# Patient Record
Sex: FEMALE | Race: WHITE | NOT HISPANIC OR LATINO | Employment: UNEMPLOYED | URBAN - METROPOLITAN AREA
[De-identification: names, ages, dates, MRNs, and addresses within clinical notes are randomized per-mention and may not be internally consistent; named-entity substitution may affect disease eponyms.]

---

## 2017-02-13 ENCOUNTER — TRANSCRIBE ORDERS (OUTPATIENT)
Dept: ADMINISTRATIVE | Facility: HOSPITAL | Age: 32
End: 2017-02-13

## 2017-02-13 DIAGNOSIS — E04.9 GOITER: ICD-10-CM

## 2017-02-13 DIAGNOSIS — R51.9 FACIAL PAIN: Primary | ICD-10-CM

## 2017-02-17 ENCOUNTER — HOSPITAL ENCOUNTER (OUTPATIENT)
Dept: RADIOLOGY | Facility: HOSPITAL | Age: 32
End: 2017-02-17
Payer: COMMERCIAL

## 2017-02-17 ENCOUNTER — HOSPITAL ENCOUNTER (OUTPATIENT)
Dept: RADIOLOGY | Facility: HOSPITAL | Age: 32
Discharge: HOME | End: 2017-02-17
Payer: COMMERCIAL

## 2017-02-17 DIAGNOSIS — E04.9 GOITER: ICD-10-CM

## 2017-02-17 PROCEDURE — 76536 US EXAM OF HEAD AND NECK: CPT

## 2017-05-29 ENCOUNTER — HOSPITAL ENCOUNTER (EMERGENCY)
Facility: HOSPITAL | Age: 32
Discharge: HOME | End: 2017-05-29
Attending: EMERGENCY MEDICINE | Admitting: EMERGENCY MEDICINE
Payer: COMMERCIAL

## 2017-05-29 VITALS
BODY MASS INDEX: 21.97 KG/M2 | DIASTOLIC BLOOD PRESSURE: 61 MMHG | TEMPERATURE: 97.9 F | RESPIRATION RATE: 18 BRPM | HEART RATE: 76 BPM | WEIGHT: 140 LBS | OXYGEN SATURATION: 98 % | SYSTOLIC BLOOD PRESSURE: 100 MMHG | HEIGHT: 67 IN

## 2017-05-29 DIAGNOSIS — K52.9 GASTROENTERITIS: Primary | ICD-10-CM

## 2017-05-29 PROCEDURE — 99283 EMERGENCY DEPT VISIT LOW MDM: CPT

## 2017-05-29 RX ORDER — METHADONE HYDROCHLORIDE 10 MG/1
70 TABLET ORAL DAILY
COMMUNITY
End: 2019-03-25

## 2017-05-29 RX ORDER — ONDANSETRON 4 MG/1
4 TABLET, FILM COATED ORAL EVERY 6 HOURS
Qty: 15 TABLET | Refills: 0 | Status: SHIPPED | OUTPATIENT
Start: 2017-05-29 | End: 2019-03-25

## 2017-05-29 RX ORDER — LEVOTHYROXINE SODIUM 0.1 MG/1
137 TABLET ORAL DAILY
COMMUNITY
End: 2019-06-02 | Stop reason: DRUGHIGH

## 2017-05-29 RX ORDER — ALPRAZOLAM 0.25 MG/1
0.25 TABLET ORAL
COMMUNITY
End: 2019-03-25

## 2017-12-28 ENCOUNTER — TRANSCRIBE ORDERS (OUTPATIENT)
Dept: ADMINISTRATIVE | Facility: HOSPITAL | Age: 32
End: 2017-12-28

## 2017-12-28 DIAGNOSIS — R10.2 ADNEXAL TENDERNESS, RIGHT: Primary | ICD-10-CM

## 2018-01-02 ENCOUNTER — GENERIC CONVERSION - ENCOUNTER (OUTPATIENT)
Dept: OTHER | Facility: OTHER | Age: 33
End: 2018-01-02

## 2018-01-02 ENCOUNTER — HOSPITAL ENCOUNTER (OUTPATIENT)
Dept: RADIOLOGY | Facility: HOSPITAL | Age: 33
Discharge: HOME | End: 2018-01-02
Attending: OBSTETRICS & GYNECOLOGY
Payer: COMMERCIAL

## 2018-01-02 DIAGNOSIS — R10.2 ADNEXAL TENDERNESS, RIGHT: ICD-10-CM

## 2018-01-02 PROCEDURE — 76856 US EXAM PELVIC COMPLETE: CPT

## 2018-01-02 PROCEDURE — 76830 TRANSVAGINAL US NON-OB: CPT

## 2018-02-26 ENCOUNTER — TRANSCRIBE ORDERS (OUTPATIENT)
Dept: ADMINISTRATIVE | Facility: HOSPITAL | Age: 33
End: 2018-02-26

## 2018-02-26 DIAGNOSIS — E03.9 HYPOTHYROIDISM, ADULT: Primary | ICD-10-CM

## 2018-02-27 ENCOUNTER — HOSPITAL ENCOUNTER (OUTPATIENT)
Dept: RADIOLOGY | Facility: HOSPITAL | Age: 33
Discharge: HOME | End: 2018-02-27
Payer: COMMERCIAL

## 2018-02-27 DIAGNOSIS — E03.9 HYPOTHYROIDISM, ADULT: ICD-10-CM

## 2018-02-27 PROCEDURE — 76536 US EXAM OF HEAD AND NECK: CPT

## 2018-07-27 ENCOUNTER — TRANSCRIBE ORDERS (OUTPATIENT)
Dept: ADMINISTRATIVE | Facility: HOSPITAL | Age: 33
End: 2018-07-27

## 2018-07-27 DIAGNOSIS — E22.1 IDIOPATHIC HYPERPROLACTINEMIA (HCC): Primary | ICD-10-CM

## 2018-08-06 ENCOUNTER — HOSPITAL ENCOUNTER (OUTPATIENT)
Dept: RADIOLOGY | Facility: HOSPITAL | Age: 33
Discharge: HOME | End: 2018-08-06
Payer: COMMERCIAL

## 2018-08-06 DIAGNOSIS — E22.1 IDIOPATHIC HYPERPROLACTINEMIA (HCC): ICD-10-CM

## 2018-08-06 PROCEDURE — A9585 GADOBUTROL INJECTION: HCPCS | Performed by: DENTIST

## 2018-08-06 PROCEDURE — 70553 MRI BRAIN STEM W/O & W/DYE: CPT

## 2018-08-06 RX ADMIN — GADOBUTROL 6 ML: 604.72 INJECTION INTRAVENOUS at 09:41

## 2018-10-05 ENCOUNTER — HOSPITAL ENCOUNTER (OUTPATIENT)
Dept: RADIOLOGY | Facility: HOSPITAL | Age: 33
Discharge: HOME | End: 2018-10-05
Payer: COMMERCIAL

## 2018-10-05 ENCOUNTER — TRANSCRIBE ORDERS (OUTPATIENT)
Dept: ADMINISTRATIVE | Facility: HOSPITAL | Age: 33
End: 2018-10-05

## 2018-10-05 DIAGNOSIS — M79.672 LEFT FOOT PAIN: ICD-10-CM

## 2018-10-05 DIAGNOSIS — M79.672 LEFT FOOT PAIN: Primary | ICD-10-CM

## 2018-10-05 PROCEDURE — 73620 X-RAY EXAM OF FOOT: CPT

## 2019-03-25 ENCOUNTER — CONSULT (OUTPATIENT)
Dept: PAIN MEDICINE | Facility: CLINIC | Age: 34
End: 2019-03-25
Payer: COMMERCIAL

## 2019-03-25 VITALS
HEIGHT: 66 IN | BODY MASS INDEX: 22.5 KG/M2 | HEART RATE: 81 BPM | WEIGHT: 140 LBS | SYSTOLIC BLOOD PRESSURE: 102 MMHG | DIASTOLIC BLOOD PRESSURE: 72 MMHG | RESPIRATION RATE: 18 BRPM

## 2019-03-25 DIAGNOSIS — M54.42 CHRONIC LEFT-SIDED LOW BACK PAIN WITH LEFT-SIDED SCIATICA: ICD-10-CM

## 2019-03-25 DIAGNOSIS — G89.4 CHRONIC PAIN SYNDROME: Primary | ICD-10-CM

## 2019-03-25 DIAGNOSIS — M54.16 LUMBAR RADICULOPATHY: ICD-10-CM

## 2019-03-25 DIAGNOSIS — G89.29 CHRONIC LEFT-SIDED LOW BACK PAIN WITH LEFT-SIDED SCIATICA: ICD-10-CM

## 2019-03-25 PROCEDURE — 99244 OFF/OP CNSLTJ NEW/EST MOD 40: CPT | Performed by: ANESTHESIOLOGY

## 2019-03-25 RX ORDER — LEVOTHYROXINE SODIUM 200 MCG
TABLET ORAL
Refills: 1 | COMMUNITY
Start: 2019-03-23 | End: 2019-06-02 | Stop reason: DRUGHIGH

## 2019-05-01 ENCOUNTER — EVALUATION (OUTPATIENT)
Dept: PHYSICAL THERAPY | Facility: CLINIC | Age: 34
End: 2019-05-01
Payer: COMMERCIAL

## 2019-05-01 DIAGNOSIS — G89.29 CHRONIC LEFT-SIDED LOW BACK PAIN WITH LEFT-SIDED SCIATICA: Primary | ICD-10-CM

## 2019-05-01 DIAGNOSIS — M54.42 CHRONIC LEFT-SIDED LOW BACK PAIN WITH LEFT-SIDED SCIATICA: Primary | ICD-10-CM

## 2019-05-01 PROCEDURE — 97162 PT EVAL MOD COMPLEX 30 MIN: CPT

## 2019-05-01 PROCEDURE — G8978 MOBILITY CURRENT STATUS: HCPCS

## 2019-05-01 PROCEDURE — G8979 MOBILITY GOAL STATUS: HCPCS

## 2019-05-03 ENCOUNTER — OFFICE VISIT (OUTPATIENT)
Dept: PHYSICAL THERAPY | Facility: CLINIC | Age: 34
End: 2019-05-03
Payer: COMMERCIAL

## 2019-05-03 DIAGNOSIS — M54.42 CHRONIC LEFT-SIDED LOW BACK PAIN WITH LEFT-SIDED SCIATICA: Primary | ICD-10-CM

## 2019-05-03 DIAGNOSIS — G89.29 CHRONIC LEFT-SIDED LOW BACK PAIN WITH LEFT-SIDED SCIATICA: Primary | ICD-10-CM

## 2019-05-03 PROCEDURE — 97110 THERAPEUTIC EXERCISES: CPT

## 2019-05-03 PROCEDURE — 97112 NEUROMUSCULAR REEDUCATION: CPT

## 2019-05-06 ENCOUNTER — OFFICE VISIT (OUTPATIENT)
Dept: PHYSICAL THERAPY | Facility: CLINIC | Age: 34
End: 2019-05-06
Payer: COMMERCIAL

## 2019-05-06 ENCOUNTER — OFFICE VISIT (OUTPATIENT)
Dept: PAIN MEDICINE | Facility: CLINIC | Age: 34
End: 2019-05-06
Payer: COMMERCIAL

## 2019-05-06 VITALS
HEIGHT: 66 IN | HEART RATE: 80 BPM | DIASTOLIC BLOOD PRESSURE: 70 MMHG | RESPIRATION RATE: 18 BRPM | WEIGHT: 136.4 LBS | BODY MASS INDEX: 21.92 KG/M2 | SYSTOLIC BLOOD PRESSURE: 100 MMHG

## 2019-05-06 DIAGNOSIS — M25.561 CHRONIC PAIN OF RIGHT KNEE: ICD-10-CM

## 2019-05-06 DIAGNOSIS — G89.4 CHRONIC PAIN SYNDROME: Primary | ICD-10-CM

## 2019-05-06 DIAGNOSIS — M54.16 LUMBAR RADICULOPATHY: ICD-10-CM

## 2019-05-06 DIAGNOSIS — M54.42 CHRONIC LEFT-SIDED LOW BACK PAIN WITH LEFT-SIDED SCIATICA: Primary | ICD-10-CM

## 2019-05-06 DIAGNOSIS — M54.42 CHRONIC LEFT-SIDED LOW BACK PAIN WITH LEFT-SIDED SCIATICA: ICD-10-CM

## 2019-05-06 DIAGNOSIS — G89.29 CHRONIC LEFT-SIDED LOW BACK PAIN WITH LEFT-SIDED SCIATICA: ICD-10-CM

## 2019-05-06 DIAGNOSIS — G89.29 CHRONIC PAIN OF RIGHT KNEE: ICD-10-CM

## 2019-05-06 DIAGNOSIS — G89.29 CHRONIC LEFT-SIDED LOW BACK PAIN WITH LEFT-SIDED SCIATICA: Primary | ICD-10-CM

## 2019-05-06 PROCEDURE — 99214 OFFICE O/P EST MOD 30 MIN: CPT | Performed by: ANESTHESIOLOGY

## 2019-05-06 PROCEDURE — 97112 NEUROMUSCULAR REEDUCATION: CPT

## 2019-05-06 PROCEDURE — 97110 THERAPEUTIC EXERCISES: CPT

## 2019-05-08 ENCOUNTER — APPOINTMENT (OUTPATIENT)
Dept: PHYSICAL THERAPY | Facility: CLINIC | Age: 34
End: 2019-05-08
Payer: COMMERCIAL

## 2019-05-13 ENCOUNTER — OFFICE VISIT (OUTPATIENT)
Dept: PHYSICAL THERAPY | Facility: CLINIC | Age: 34
End: 2019-05-13
Payer: COMMERCIAL

## 2019-05-13 DIAGNOSIS — G89.29 CHRONIC LEFT-SIDED LOW BACK PAIN WITH LEFT-SIDED SCIATICA: Primary | ICD-10-CM

## 2019-05-13 DIAGNOSIS — M54.42 CHRONIC LEFT-SIDED LOW BACK PAIN WITH LEFT-SIDED SCIATICA: Primary | ICD-10-CM

## 2019-05-13 PROCEDURE — 97140 MANUAL THERAPY 1/> REGIONS: CPT

## 2019-05-13 PROCEDURE — 97110 THERAPEUTIC EXERCISES: CPT

## 2019-05-13 PROCEDURE — 97112 NEUROMUSCULAR REEDUCATION: CPT

## 2019-05-15 ENCOUNTER — OFFICE VISIT (OUTPATIENT)
Dept: PHYSICAL THERAPY | Facility: CLINIC | Age: 34
End: 2019-05-15
Payer: COMMERCIAL

## 2019-05-15 DIAGNOSIS — M54.42 CHRONIC LEFT-SIDED LOW BACK PAIN WITH LEFT-SIDED SCIATICA: Primary | ICD-10-CM

## 2019-05-15 DIAGNOSIS — G89.29 CHRONIC LEFT-SIDED LOW BACK PAIN WITH LEFT-SIDED SCIATICA: Primary | ICD-10-CM

## 2019-05-15 PROCEDURE — 97110 THERAPEUTIC EXERCISES: CPT

## 2019-05-15 PROCEDURE — 97112 NEUROMUSCULAR REEDUCATION: CPT

## 2019-05-20 ENCOUNTER — OFFICE VISIT (OUTPATIENT)
Dept: PHYSICAL THERAPY | Facility: CLINIC | Age: 34
End: 2019-05-20
Payer: COMMERCIAL

## 2019-05-20 DIAGNOSIS — M54.42 CHRONIC LEFT-SIDED LOW BACK PAIN WITH LEFT-SIDED SCIATICA: Primary | ICD-10-CM

## 2019-05-20 DIAGNOSIS — G89.29 CHRONIC LEFT-SIDED LOW BACK PAIN WITH LEFT-SIDED SCIATICA: Primary | ICD-10-CM

## 2019-05-20 PROCEDURE — 97112 NEUROMUSCULAR REEDUCATION: CPT

## 2019-05-20 PROCEDURE — 97110 THERAPEUTIC EXERCISES: CPT

## 2019-05-22 ENCOUNTER — OFFICE VISIT (OUTPATIENT)
Dept: PHYSICAL THERAPY | Facility: CLINIC | Age: 34
End: 2019-05-22
Payer: COMMERCIAL

## 2019-05-22 DIAGNOSIS — G89.29 CHRONIC LEFT-SIDED LOW BACK PAIN WITH LEFT-SIDED SCIATICA: Primary | ICD-10-CM

## 2019-05-22 DIAGNOSIS — M54.42 CHRONIC LEFT-SIDED LOW BACK PAIN WITH LEFT-SIDED SCIATICA: Primary | ICD-10-CM

## 2019-05-22 PROCEDURE — 97110 THERAPEUTIC EXERCISES: CPT

## 2019-05-22 PROCEDURE — 97112 NEUROMUSCULAR REEDUCATION: CPT

## 2019-05-28 ENCOUNTER — APPOINTMENT (OUTPATIENT)
Dept: PHYSICAL THERAPY | Facility: CLINIC | Age: 34
End: 2019-05-28
Payer: COMMERCIAL

## 2019-05-30 ENCOUNTER — OFFICE VISIT (OUTPATIENT)
Dept: PHYSICAL THERAPY | Facility: CLINIC | Age: 34
End: 2019-05-30
Payer: COMMERCIAL

## 2019-05-30 DIAGNOSIS — G89.29 CHRONIC LEFT-SIDED LOW BACK PAIN WITH LEFT-SIDED SCIATICA: Primary | ICD-10-CM

## 2019-05-30 DIAGNOSIS — M54.42 CHRONIC LEFT-SIDED LOW BACK PAIN WITH LEFT-SIDED SCIATICA: Primary | ICD-10-CM

## 2019-05-30 PROCEDURE — G8981 BODY POS CURRENT STATUS: HCPCS

## 2019-05-30 PROCEDURE — G8983 BODY POS D/C STATUS: HCPCS

## 2019-05-30 PROCEDURE — G8982 BODY POS GOAL STATUS: HCPCS

## 2019-05-30 PROCEDURE — 97112 NEUROMUSCULAR REEDUCATION: CPT

## 2019-05-30 PROCEDURE — 97140 MANUAL THERAPY 1/> REGIONS: CPT

## 2019-05-30 PROCEDURE — 97110 THERAPEUTIC EXERCISES: CPT

## 2019-06-02 ENCOUNTER — APPOINTMENT (EMERGENCY)
Dept: RADIOLOGY | Facility: HOSPITAL | Age: 34
End: 2019-06-02
Payer: COMMERCIAL

## 2019-06-02 ENCOUNTER — HOSPITAL ENCOUNTER (EMERGENCY)
Facility: HOSPITAL | Age: 34
Discharge: HOME | End: 2019-06-02
Attending: EMERGENCY MEDICINE | Admitting: EMERGENCY MEDICINE
Payer: COMMERCIAL

## 2019-06-02 VITALS
DIASTOLIC BLOOD PRESSURE: 55 MMHG | SYSTOLIC BLOOD PRESSURE: 91 MMHG | OXYGEN SATURATION: 100 % | WEIGHT: 136 LBS | HEART RATE: 76 BPM | RESPIRATION RATE: 16 BRPM | TEMPERATURE: 98.4 F | BODY MASS INDEX: 21.95 KG/M2

## 2019-06-02 DIAGNOSIS — Z3A.01 LESS THAN 8 WEEKS GESTATION OF PREGNANCY: Primary | ICD-10-CM

## 2019-06-02 DIAGNOSIS — N83.10 CORPUS LUTEUM CYST: ICD-10-CM

## 2019-06-02 LAB
ANION GAP SERPL CALCULATED.3IONS-SCNC: 3 MMOL/L (ref 4–13)
B-HCG SERPL-ACNC: ABNORMAL MIU/ML
BASOPHILS # BLD AUTO: 0.07 THOUSANDS/ΜL (ref 0–0.1)
BASOPHILS NFR BLD AUTO: 1 % (ref 0–1)
BILIRUB UR QL STRIP: NEGATIVE
BUN SERPL-MCNC: 10 MG/DL (ref 5–25)
CALCIUM SERPL-MCNC: 8.5 MG/DL (ref 8.3–10.1)
CHLORIDE SERPL-SCNC: 103 MMOL/L (ref 100–108)
CLARITY UR: ABNORMAL
CO2 SERPL-SCNC: 31 MMOL/L (ref 21–32)
COLOR UR: YELLOW
CREAT SERPL-MCNC: 0.62 MG/DL (ref 0.6–1.3)
EOSINOPHIL # BLD AUTO: 0.17 THOUSAND/ΜL (ref 0–0.61)
EOSINOPHIL NFR BLD AUTO: 2 % (ref 0–6)
ERYTHROCYTE [DISTWIDTH] IN BLOOD BY AUTOMATED COUNT: 13.2 % (ref 11.6–15.1)
EXT PREG TEST URINE: POSITIVE
GFR SERPL CREATININE-BSD FRML MDRD: 118 ML/MIN/1.73SQ M
GLUCOSE SERPL-MCNC: 109 MG/DL (ref 65–140)
GLUCOSE UR STRIP-MCNC: NEGATIVE MG/DL
HCT VFR BLD AUTO: 38 % (ref 34.8–46.1)
HGB BLD-MCNC: 13 G/DL (ref 11.5–15.4)
HGB UR QL STRIP.AUTO: NEGATIVE
IMM GRANULOCYTES # BLD AUTO: 0.04 THOUSAND/UL (ref 0–0.2)
IMM GRANULOCYTES NFR BLD AUTO: 0 % (ref 0–2)
KETONES UR STRIP-MCNC: ABNORMAL MG/DL
LEUKOCYTE ESTERASE UR QL STRIP: NEGATIVE
LYMPHOCYTES # BLD AUTO: 2.36 THOUSANDS/ΜL (ref 0.6–4.47)
LYMPHOCYTES NFR BLD AUTO: 22 % (ref 14–44)
MCH RBC QN AUTO: 33 PG (ref 26.8–34.3)
MCHC RBC AUTO-ENTMCNC: 34.2 G/DL (ref 31.4–37.4)
MCV RBC AUTO: 96 FL (ref 82–98)
MONOCYTES # BLD AUTO: 0.8 THOUSAND/ΜL (ref 0.17–1.22)
MONOCYTES NFR BLD AUTO: 7 % (ref 4–12)
NEUTROPHILS # BLD AUTO: 7.52 THOUSANDS/ΜL (ref 1.85–7.62)
NEUTS SEG NFR BLD AUTO: 68 % (ref 43–75)
NITRITE UR QL STRIP: NEGATIVE
NRBC BLD AUTO-RTO: 0 /100 WBCS
PH UR STRIP.AUTO: 8 [PH]
PLATELET # BLD AUTO: 303 THOUSANDS/UL (ref 149–390)
PMV BLD AUTO: 9.9 FL (ref 8.9–12.7)
POTASSIUM SERPL-SCNC: 3.5 MMOL/L (ref 3.5–5.3)
PROT UR STRIP-MCNC: NEGATIVE MG/DL
RBC # BLD AUTO: 3.94 MILLION/UL (ref 3.81–5.12)
SODIUM SERPL-SCNC: 137 MMOL/L (ref 136–145)
SP GR UR STRIP.AUTO: 1.02 (ref 1–1.03)
UROBILINOGEN UR QL STRIP.AUTO: 0.2 E.U./DL
WBC # BLD AUTO: 10.96 THOUSAND/UL (ref 4.31–10.16)

## 2019-06-02 PROCEDURE — 76705 ECHO EXAM OF ABDOMEN: CPT

## 2019-06-02 PROCEDURE — 81025 URINE PREGNANCY TEST: CPT | Performed by: PHYSICIAN ASSISTANT

## 2019-06-02 PROCEDURE — 87491 CHLMYD TRACH DNA AMP PROBE: CPT | Performed by: PHYSICIAN ASSISTANT

## 2019-06-02 PROCEDURE — 85025 COMPLETE CBC W/AUTO DIFF WBC: CPT | Performed by: PHYSICIAN ASSISTANT

## 2019-06-02 PROCEDURE — 81003 URINALYSIS AUTO W/O SCOPE: CPT | Performed by: PHYSICIAN ASSISTANT

## 2019-06-02 PROCEDURE — 84702 CHORIONIC GONADOTROPIN TEST: CPT | Performed by: PHYSICIAN ASSISTANT

## 2019-06-02 PROCEDURE — 96360 HYDRATION IV INFUSION INIT: CPT

## 2019-06-02 PROCEDURE — 80048 BASIC METABOLIC PNL TOTAL CA: CPT | Performed by: PHYSICIAN ASSISTANT

## 2019-06-02 PROCEDURE — 87591 N.GONORRHOEAE DNA AMP PROB: CPT | Performed by: PHYSICIAN ASSISTANT

## 2019-06-02 PROCEDURE — 76801 OB US < 14 WKS SINGLE FETUS: CPT

## 2019-06-02 PROCEDURE — 99284 EMERGENCY DEPT VISIT MOD MDM: CPT

## 2019-06-02 PROCEDURE — 36415 COLL VENOUS BLD VENIPUNCTURE: CPT | Performed by: PHYSICIAN ASSISTANT

## 2019-06-02 RX ORDER — LEVOTHYROXINE SODIUM 300 UG/1
300 TABLET ORAL DAILY
COMMUNITY
End: 2019-07-10 | Stop reason: SDUPTHER

## 2019-06-02 RX ADMIN — SODIUM CHLORIDE 1000 ML: 0.9 INJECTION, SOLUTION INTRAVENOUS at 17:30

## 2019-06-03 LAB
C TRACH DNA SPEC QL NAA+PROBE: NEGATIVE
N GONORRHOEA DNA SPEC QL NAA+PROBE: NEGATIVE

## 2019-06-13 ENCOUNTER — HOSPITAL ENCOUNTER (EMERGENCY)
Facility: HOSPITAL | Age: 34
Discharge: HOME | End: 2019-06-13
Attending: EMERGENCY MEDICINE | Admitting: EMERGENCY MEDICINE
Payer: COMMERCIAL

## 2019-06-13 VITALS
TEMPERATURE: 98.3 F | SYSTOLIC BLOOD PRESSURE: 107 MMHG | HEART RATE: 75 BPM | RESPIRATION RATE: 18 BRPM | DIASTOLIC BLOOD PRESSURE: 70 MMHG | OXYGEN SATURATION: 100 %

## 2019-06-13 DIAGNOSIS — Z3A.01 LESS THAN 8 WEEKS GESTATION OF PREGNANCY: ICD-10-CM

## 2019-06-13 DIAGNOSIS — R19.7 DIARRHEA: ICD-10-CM

## 2019-06-13 DIAGNOSIS — R10.84 GENERALIZED ABDOMINAL PAIN: Primary | ICD-10-CM

## 2019-06-13 LAB
ALBUMIN SERPL BCP-MCNC: 3.8 G/DL (ref 3.5–5)
ALP SERPL-CCNC: 49 U/L (ref 46–116)
ALT SERPL W P-5'-P-CCNC: 12 U/L (ref 12–78)
ANION GAP SERPL CALCULATED.3IONS-SCNC: 6 MMOL/L (ref 4–13)
AST SERPL W P-5'-P-CCNC: 11 U/L (ref 5–45)
BASOPHILS # BLD AUTO: 0.07 THOUSANDS/ΜL (ref 0–0.1)
BASOPHILS NFR BLD AUTO: 1 % (ref 0–1)
BILIRUB SERPL-MCNC: 0.4 MG/DL (ref 0.2–1)
BUN SERPL-MCNC: 6 MG/DL (ref 5–25)
CALCIUM SERPL-MCNC: 9 MG/DL (ref 8.3–10.1)
CHLORIDE SERPL-SCNC: 102 MMOL/L (ref 100–108)
CO2 SERPL-SCNC: 31 MMOL/L (ref 21–32)
CREAT SERPL-MCNC: 0.57 MG/DL (ref 0.6–1.3)
EOSINOPHIL # BLD AUTO: 0.17 THOUSAND/ΜL (ref 0–0.61)
EOSINOPHIL NFR BLD AUTO: 1 % (ref 0–6)
ERYTHROCYTE [DISTWIDTH] IN BLOOD BY AUTOMATED COUNT: 12.5 % (ref 11.6–15.1)
GFR SERPL CREATININE-BSD FRML MDRD: 121 ML/MIN/1.73SQ M
GLUCOSE SERPL-MCNC: 78 MG/DL (ref 65–140)
HCT VFR BLD AUTO: 35.6 % (ref 34.8–46.1)
HGB BLD-MCNC: 12.5 G/DL (ref 11.5–15.4)
IMM GRANULOCYTES # BLD AUTO: 0.04 THOUSAND/UL (ref 0–0.2)
IMM GRANULOCYTES NFR BLD AUTO: 0 % (ref 0–2)
LYMPHOCYTES # BLD AUTO: 2.52 THOUSANDS/ΜL (ref 0.6–4.47)
LYMPHOCYTES NFR BLD AUTO: 20 % (ref 14–44)
MCH RBC QN AUTO: 33.2 PG (ref 26.8–34.3)
MCHC RBC AUTO-ENTMCNC: 35.1 G/DL (ref 31.4–37.4)
MCV RBC AUTO: 94 FL (ref 82–98)
MONOCYTES # BLD AUTO: 0.83 THOUSAND/ΜL (ref 0.17–1.22)
MONOCYTES NFR BLD AUTO: 7 % (ref 4–12)
NEUTROPHILS # BLD AUTO: 8.74 THOUSANDS/ΜL (ref 1.85–7.62)
NEUTS SEG NFR BLD AUTO: 71 % (ref 43–75)
NRBC BLD AUTO-RTO: 0 /100 WBCS
PLATELET # BLD AUTO: 239 THOUSANDS/UL (ref 149–390)
PMV BLD AUTO: 9.4 FL (ref 8.9–12.7)
POTASSIUM SERPL-SCNC: 3.3 MMOL/L (ref 3.5–5.3)
PROT SERPL-MCNC: 6.9 G/DL (ref 6.4–8.2)
RBC # BLD AUTO: 3.77 MILLION/UL (ref 3.81–5.12)
SODIUM SERPL-SCNC: 139 MMOL/L (ref 136–145)
WBC # BLD AUTO: 12.37 THOUSAND/UL (ref 4.31–10.16)

## 2019-06-13 PROCEDURE — 96361 HYDRATE IV INFUSION ADD-ON: CPT

## 2019-06-13 PROCEDURE — 80053 COMPREHEN METABOLIC PANEL: CPT | Performed by: EMERGENCY MEDICINE

## 2019-06-13 PROCEDURE — 99284 EMERGENCY DEPT VISIT MOD MDM: CPT

## 2019-06-13 PROCEDURE — 96374 THER/PROPH/DIAG INJ IV PUSH: CPT

## 2019-06-13 PROCEDURE — 36415 COLL VENOUS BLD VENIPUNCTURE: CPT | Performed by: EMERGENCY MEDICINE

## 2019-06-13 PROCEDURE — 85025 COMPLETE CBC W/AUTO DIFF WBC: CPT | Performed by: EMERGENCY MEDICINE

## 2019-06-13 RX ORDER — METOCLOPRAMIDE HYDROCHLORIDE 5 MG/ML
10 INJECTION INTRAMUSCULAR; INTRAVENOUS ONCE
Status: COMPLETED | OUTPATIENT
Start: 2019-06-13 | End: 2019-06-13

## 2019-06-13 RX ADMIN — SODIUM CHLORIDE 1000 ML: 0.9 INJECTION, SOLUTION INTRAVENOUS at 20:31

## 2019-06-13 RX ADMIN — METOCLOPRAMIDE 10 MG: 5 INJECTION, SOLUTION INTRAMUSCULAR; INTRAVENOUS at 20:31

## 2019-06-26 ENCOUNTER — INITIAL PRENATAL (OUTPATIENT)
Dept: OBGYN CLINIC | Facility: CLINIC | Age: 34
End: 2019-06-26
Payer: COMMERCIAL

## 2019-06-26 VITALS
SYSTOLIC BLOOD PRESSURE: 92 MMHG | BODY MASS INDEX: 22.4 KG/M2 | DIASTOLIC BLOOD PRESSURE: 58 MMHG | HEIGHT: 66 IN | WEIGHT: 139.4 LBS

## 2019-06-26 DIAGNOSIS — E06.3 HASHIMOTO'S DISEASE: ICD-10-CM

## 2019-06-26 DIAGNOSIS — Z34.81 PRENATAL CARE, SUBSEQUENT PREGNANCY, FIRST TRIMESTER: Primary | ICD-10-CM

## 2019-06-26 PROCEDURE — 99203 OFFICE O/P NEW LOW 30 MIN: CPT | Performed by: NURSE PRACTITIONER

## 2019-06-29 LAB
BACTERIA GENITAL AEROBE CULT: NORMAL
Lab: NORMAL

## 2019-07-01 LAB
C TRACH RRNA CVX QL NAA+PROBE: NEGATIVE
CYTOLOGIST CVX/VAG CYTO: ABNORMAL
DX ICD CODE: ABNORMAL
DX ICD CODE: ABNORMAL
EXTERNAL RH FACTOR: POSITIVE
HPV I/H RISK 1 DNA CVX QL PROBE+SIG AMP: NEGATIVE
N GONORRHOEA RRNA CVX QL NAA+PROBE: NEGATIVE
OTHER STN SPEC: ABNORMAL
PATH REPORT.FINAL DX SPEC: ABNORMAL
PATHOLOGIST CVX/VAG CYTO: ABNORMAL
RECOM F/U CVX/VAG CYTO: ABNORMAL
SL AMB NOTE:: ABNORMAL
SL AMB SPECIMEN ADEQUACY: ABNORMAL
SL AMB TEST METHODOLOGY: ABNORMAL

## 2019-07-02 LAB
ABO GROUP BLD: NORMAL
APPEARANCE UR: ABNORMAL
BASOPHILS # BLD AUTO: 0 X10E3/UL (ref 0–0.2)
BASOPHILS NFR BLD AUTO: 0 %
BILIRUB UR QL STRIP: NEGATIVE
BLD GP AB SCN SERPL QL: NEGATIVE
COLOR UR: YELLOW
EOSINOPHIL # BLD AUTO: 0.2 X10E3/UL (ref 0–0.4)
EOSINOPHIL NFR BLD AUTO: 2 %
ERYTHROCYTE [DISTWIDTH] IN BLOOD BY AUTOMATED COUNT: 13 % (ref 12.3–15.4)
GLUCOSE UR QL: NEGATIVE
HBV SURFACE AG SERPL QL IA: NEGATIVE
HCT VFR BLD AUTO: 37.5 % (ref 34–46.6)
HCV AB S/CO SERPL IA: <0.1 S/CO RATIO (ref 0–0.9)
HGB BLD-MCNC: 13 G/DL (ref 11.1–15.9)
HGB UR QL STRIP: NEGATIVE
HIV 1+2 AB+HIV1 P24 AG SERPL QL IA: NON REACTIVE
IMM GRANULOCYTES # BLD: 0 X10E3/UL (ref 0–0.1)
IMM GRANULOCYTES NFR BLD: 0 %
KETONES UR QL STRIP: NEGATIVE
LEUKOCYTE ESTERASE UR QL STRIP: NEGATIVE
LYMPHOCYTES # BLD AUTO: 1.8 X10E3/UL (ref 0.7–3.1)
LYMPHOCYTES NFR BLD AUTO: 19 %
MCH RBC QN AUTO: 32.6 PG (ref 26.6–33)
MCHC RBC AUTO-ENTMCNC: 34.7 G/DL (ref 31.5–35.7)
MCV RBC AUTO: 94 FL (ref 79–97)
MICRO URNS: ABNORMAL
MONOCYTES # BLD AUTO: 0.7 X10E3/UL (ref 0.1–0.9)
MONOCYTES NFR BLD AUTO: 7 %
NEUTROPHILS # BLD AUTO: 7 X10E3/UL (ref 1.4–7)
NEUTROPHILS NFR BLD AUTO: 72 %
NITRITE UR QL STRIP: NEGATIVE
PH UR STRIP: 6 [PH] (ref 5–7.5)
PLATELET # BLD AUTO: 296 X10E3/UL (ref 150–450)
PROT UR QL STRIP: NEGATIVE
RBC # BLD AUTO: 3.99 X10E6/UL (ref 3.77–5.28)
RH BLD: POSITIVE
RPR SER QL: NON REACTIVE
RUBV IGG SERPL IA-ACNC: 7.62 INDEX
SP GR UR: 1.02 (ref 1–1.03)
UROBILINOGEN UR STRIP-ACNC: 0.2 EU/DL (ref 0.2–1)
WBC # BLD AUTO: 9.6 X10E3/UL (ref 3.4–10.8)

## 2019-07-03 DIAGNOSIS — Z34.81 PRENATAL CARE, SUBSEQUENT PREGNANCY, FIRST TRIMESTER: Primary | ICD-10-CM

## 2019-07-09 ENCOUNTER — ULTRASOUND (OUTPATIENT)
Dept: PERINATAL CARE | Facility: OTHER | Age: 34
End: 2019-07-09
Payer: COMMERCIAL

## 2019-07-09 VITALS
DIASTOLIC BLOOD PRESSURE: 80 MMHG | HEIGHT: 66 IN | SYSTOLIC BLOOD PRESSURE: 124 MMHG | WEIGHT: 146.2 LBS | HEART RATE: 81 BPM | BODY MASS INDEX: 23.5 KG/M2

## 2019-07-09 DIAGNOSIS — Z34.81 PRENATAL CARE, SUBSEQUENT PREGNANCY, FIRST TRIMESTER: ICD-10-CM

## 2019-07-09 DIAGNOSIS — O28.3 INCREASED NUCHAL TRANSLUCENCY SPACE ON FETAL ULTRASOUND: Primary | ICD-10-CM

## 2019-07-09 DIAGNOSIS — E06.3 HASHIMOTO'S DISEASE: ICD-10-CM

## 2019-07-09 DIAGNOSIS — O99.281 HYPOTHYROIDISM DURING PREGNANCY IN FIRST TRIMESTER: ICD-10-CM

## 2019-07-09 DIAGNOSIS — E03.9 HYPOTHYROIDISM DURING PREGNANCY IN FIRST TRIMESTER: ICD-10-CM

## 2019-07-09 DIAGNOSIS — Z3A.11 11 WEEKS GESTATION OF PREGNANCY: ICD-10-CM

## 2019-07-09 PROBLEM — Z36.82 ENCOUNTER FOR NUCHAL TRANSLUCENCY TESTING: Status: ACTIVE | Noted: 2019-07-09

## 2019-07-09 PROCEDURE — 76813 OB US NUCHAL MEAS 1 GEST: CPT | Performed by: OBSTETRICS & GYNECOLOGY

## 2019-07-09 PROCEDURE — 76801 OB US < 14 WKS SINGLE FETUS: CPT | Performed by: OBSTETRICS & GYNECOLOGY

## 2019-07-09 PROCEDURE — 99242 OFF/OP CONSLTJ NEW/EST SF 20: CPT | Performed by: OBSTETRICS & GYNECOLOGY

## 2019-07-09 RX ORDER — LEVOTHYROXINE SODIUM 200 MCG
300 TABLET ORAL DAILY
Refills: 0 | COMMUNITY
Start: 2019-07-05 | End: 2020-08-12

## 2019-07-09 NOTE — PROGRESS NOTES
CONSULT NOTE    Caryn Winter MD  [address] [place] [address], 960 [facility]     Thank you for referring your Mariluz Pugh for a Maternal-Fetal Medicine Consultation:  Below is my consultation  Thank you very much for requesting a consultation on this very nice patient for the indication genetic screening with history of hypothyroidism  The patient has had 6 prior pregnancies, her 1st of which was a vaginal delivery  She underwent a  delivery in  that was complicated by macrosomia and polyhydramnios  Her daughter weighed 9 lb 3 oz  She has a history of having had a heterotopic pregnancy and 2 miscarriages as well  She has Hashimoto's hypothyroidism, polycystic ovarian syndrome, and interstitial cystitis  She has had surgery for heterotopic pregnancy and a cystectomy  She denies the current use of tobacco, alcohol, or drugs  She currently takes Synthroid 200 micro g daily  She has an allergy to Demerol, ketorolac, Lyrica, and morphine as well as latex  Her family medical history is otherwise significant for mitral valve prolapse  A review of systems is otherwise negative  The nuchal translucency measures 6 5 mm, which is greater than the 99th%tile for crown-rump length  The nasal bone appears to be present  The patient was informed of today's findings and all of her questions were answered  The possible etiologies of an increased nuchal translucency measurement were discussed including aneuploidy, non-chromosomal genetic disorders, underlying structural abnormalies such as skeletal dysplasias and cardiac defects as well as it being a marker for adverse pregnancy outcomes versus a normal variant  The patient was offered genetic counseling  and this was scheduled for tomorrow at 2:00 p m  With the possibility of chorionic villus sampling after  The patient and I discussed her current state of hypothyroidism  She indicates that her most recent TSH level was 0 2    She is being managed by an endocrinologist in [place]  No records are available to review  I recommend repeating her TSH within the next 4-6 weeks and adjusting her Synthroid dose to a TSH level with the trimester specific goal  in (mU/ML) as follows: First trimester 0 1 to 2 5, Second trimester 0 2 to 3 0, Third trimester 0 3 to 3 0   I also recommend drawing a TSH level every 4-6 weeks and adjusting her Synthroid dose accordingly as pregnancy can significantly increase your requirements for thyroid hormones especially during the second trimester  As long as the patient's hypothyroidism is well controlled, she should not have any significant increased risk of adverse pregnancy outcome  Poorly controlled maternal hypothyroidism does place  the pregnancy at risk for preeclampsia, placental abruption, nonreassuring fetal heart rate,  birth, low birthweight,  morbidity and mortality, neuropsychological and cognitive impairment, and postpartum hemorrhage  We discussed these risks and the importance of medication adherence to achieve euthyroidism  As long as the patient's hypothyroidism is well controlled and no issues arise during the pregnancy, no deviation from normal labor and delivery is required  Thank you very much for allowing us to participate in the care of this very nice patient  Should you have any questions, please do not hesitate to contact our office  Please note, in addition to the time spent discussing the results of the ultrasound, I spent approximately 30 minutes of face-to-face time with the patient, greater than 50% of which was spent in counseling and the coordination of care for this patient  Portions of the record may have been created with voice recognition software  Occasional wrong word or "sound a like" substitutions may have occurred due to the inherent limitations of voice recognition software    Read the chart carefully and recognize, using context, where substitutions have occurred  Marcos Farnsworth MD  Attending Physician, Shahnaz

## 2019-07-09 NOTE — LETTER
2019     Forrestine Dakin, MD  [address]    Patient: Leslee Méndez   YOB: 1985   Date of Visit: 2019       Dear Dr Mike Ang: Thank you for referring Katherine Rosa to me for evaluation  Below are my notes for this consultation  If you have questions, please do not hesitate to call me  I look forward to following your patient along with you  Sincerely,        Ramírez Rangel MD        CC: No Recipients  Ramírez Rangel MD  2019 11:55 AM  Sign at close encounter  [address]  MD XOCHITL  [address] [place] [address], [address] [place] [address]     Thank you for referring your Leslee Méndez for a Maternal-Fetal Medicine Consultation:  Below is my consultation  Thank you very much for requesting a consultation on this very nice patient for the indication genetic screening with history of hypothyroidism  The patient has had 6 prior pregnancies, her 1st of which was a vaginal delivery  She underwent a  delivery in  that was complicated by macrosomia and polyhydramnios  Her daughter weighed 9 lb 3 oz  She has a history of having had a heterotopic pregnancy and 2 miscarriages as well  She has Hashimoto's hypothyroidism, polycystic ovarian syndrome, and interstitial cystitis  She has had surgery for heterotopic pregnancy and a cystectomy  She denies the current use of tobacco, alcohol, or drugs  She currently takes Synthroid 200 micro g daily  She has an allergy to Demerol, ketorolac, Lyrica, and morphine as well as latex  Her family medical history is otherwise significant for mitral valve prolapse  A review of systems is otherwise negative  The nuchal translucency measures 6 5 mm, which is greater than the 99th%tile for crown-rump length  The nasal bone appears to be present  The patient was informed of today's findings and all of her questions were answered    The possible etiologies of an increased nuchal translucency measurement were discussed including aneuploidy, non-chromosomal genetic disorders, underlying structural abnormalies such as skeletal dysplasias and cardiac defects as well as it being a marker for adverse pregnancy outcomes versus a normal variant  The patient was offered genetic counseling  and this was scheduled for tomorrow at 2:00 p m  With the possibility of chorionic villus sampling after  The patient and I discussed her current state of hypothyroidism  She indicates that her most recent TSH level was 0 2  She is being managed by an endocrinologist in [place]  No records are available to review  I recommend repeating her TSH within the next 4-6 weeks and adjusting her Synthroid dose to a TSH level with the trimester specific goal  in (mU/ML) as follows: First trimester 0 1 to 2 5, Second trimester 0 2 to 3 0, Third trimester 0 3 to 3 0   I also recommend drawing a TSH level every 4-6 weeks and adjusting her Synthroid dose accordingly as pregnancy can significantly increase your requirements for thyroid hormones especially during the second trimester  As long as the patient's hypothyroidism is well controlled, she should not have any significant increased risk of adverse pregnancy outcome  Poorly controlled maternal hypothyroidism does place  the pregnancy at risk for preeclampsia, placental abruption, nonreassuring fetal heart rate,  birth, low birthweight,  morbidity and mortality, neuropsychological and cognitive impairment, and postpartum hemorrhage  We discussed these risks and the importance of medication adherence to achieve euthyroidism  As long as the patient's hypothyroidism is well controlled and no issues arise during the pregnancy, no deviation from normal labor and delivery is required  Thank you very much for allowing us to participate in the care of this very nice patient    Should you have any questions, please do not hesitate to contact our office  Please note, in addition to the time spent discussing the results of the ultrasound, I spent approximately 30 minutes of face-to-face time with the patient, greater than 50% of which was spent in counseling and the coordination of care for this patient  Portions of the record may have been created with voice recognition software  Occasional wrong word or "sound a like" substitutions may have occurred due to the inherent limitations of voice recognition software  Read the chart carefully and recognize, using context, where substitutions have occurred  Marcos Flores MD  Attending Physician, Shahnaz

## 2019-07-10 ENCOUNTER — OFFICE VISIT (OUTPATIENT)
Dept: PERINATAL CARE | Facility: CLINIC | Age: 34
End: 2019-07-10
Payer: COMMERCIAL

## 2019-07-10 ENCOUNTER — PROCEDURE VISIT (OUTPATIENT)
Dept: PERINATAL CARE | Facility: CLINIC | Age: 34
End: 2019-07-10
Payer: COMMERCIAL

## 2019-07-10 VITALS
SYSTOLIC BLOOD PRESSURE: 112 MMHG | DIASTOLIC BLOOD PRESSURE: 71 MMHG | HEIGHT: 66 IN | HEART RATE: 87 BPM | BODY MASS INDEX: 23.4 KG/M2 | WEIGHT: 145.6 LBS

## 2019-07-10 DIAGNOSIS — Z36.82 ENCOUNTER FOR NUCHAL TRANSLUCENCY TESTING: ICD-10-CM

## 2019-07-10 DIAGNOSIS — Z3A.11 11 WEEKS GESTATION OF PREGNANCY: Primary | ICD-10-CM

## 2019-07-10 DIAGNOSIS — O28.3 INCREASED NUCHAL TRANSLUCENCY SPACE ON FETAL ULTRASOUND: ICD-10-CM

## 2019-07-10 DIAGNOSIS — O35.9XX0 FETAL ABNORMALITY AFFECTING MANAGEMENT OF MOTHER, SINGLE OR UNSPECIFIED FETUS: Primary | ICD-10-CM

## 2019-07-10 DIAGNOSIS — Z36.0 ENCOUNTER FOR CHORIONIC VILLUS SAMPLING: ICD-10-CM

## 2019-07-10 PROCEDURE — 59015 CHORION BIOPSY: CPT | Performed by: OBSTETRICS & GYNECOLOGY

## 2019-07-10 PROCEDURE — 76945 ECHO GUIDE VILLUS SAMPLING: CPT | Performed by: OBSTETRICS & GYNECOLOGY

## 2019-07-10 NOTE — PROGRESS NOTES
Genetic Counseling   High-Risk Gestation Note    Appointment Date:  7/10/2019  Referred By: Shant Martin MD  YOB: 1985  Partner:  Yessica House     Indication for Visit:  abnormal ultrasound screen    Pregnancy History: [id number]  Estimated Date of Delivery: 01/28/20  Estimated Gestational Age: 11w1d     Genetic Counseling: Natan is a 29 y o  female who is here to discuss an abnormal ultrasound finding of an increased NT measurement  Issues Discussed:  average population risk- 3-4% in the average pregnancy of serious condition or birth defect  2-3% risk of mental retardation  Not all detected by prenatal testing  and other malformation:  increased nuchal translucency    Options Discussed:  Amniocentesis-risks and limitations discussed  CVS-Risks and limitations discussed  Ethnic screening discussed-clinical and genetic basis of CF, SMA, and Fragile X syndrome  Variability and treatment addressed  Level II ultrasound to screen for structural anomalies  Serum AFP screen recommended at 15-17 weeks to check for open neural tube defects  Cell free fetal DNA testing  Fetal echocardiogram     Additional Information / Impression:  Natan is a 29 y o  female who presented for genetic counseling to discuss risks related to the abnormal ultrasound finding of an increased NT measurement  Her partner accompanied her to the session  We reviewed with the patient that nuchal translucency (NT) is defined as a subcutaneous accumulation of fluid in the fetal neck  It may be recognized by ultrasound between the 10th and the 14th week of gestation as a sonolucent area in the region of the fetal neck    We discussed that there are several proposed mechanisms of an increased NT, including cardiac failure secondary to abnormalities of the heart or great arteries, or altered composition of the subcutaneous tissue resulting from various fetal chromosome abnormalities, fetal infections, renal or other genitourinary abnormalities, or several other genetic syndromes such as single gene disorders or microdeletions  It is also possible that there is a healthy fetus with an isolated increased nuchal translucency  Prognosis depends on the underlying etiology  We discussed that given a measurement of 6 53mm, the risk for a chromosome abnormality is approximately 64 5% and the chance for any congenital anomaly is approximately 46 2%  The chance of fetal miscarriage or death is approximately 19%  We reviewed the available testing and screening options for the pregnancy  The risks, benefits, and limitations of amniocentesis were discussed with the patient  Amniocentesis is performed under direct real time ultrasound visualization to avoid both the fetus and the placenta  Once amniotic fluid is withdrawn, laboratory analysis is performed and amniotic fluid alpha-fetoprotein, as well as chromosome analysis is undertaken  Molecular testing for Charmaine syndrome can also be performed  The risk of genetic amniocentesis includes, but is not limited to less than 1 in 300 pregnancy loss rate or  delivery rate if 23 weeks or greater, infection, bleeding, rupture of membranes, failure of cells to grow, karyotype error, laboratory error, etc   Occasionally a repeat amniocentesis is necessary due to cell culture failure  Chromosome analysis from amniocentesis is 99 9% accurate and alpha-fetoprotein analysis can detect approximately 95% of open neural tube defects  Chorionic villus sampling (CVS) is another diagnostic testing option that is available earlier than amniocentesis, between 10-14 weeks gestation  Like amniocentesis, CVS is 99% accurate for detecting chromosomal problems and testing for Charmaine syndrome can be done  Unlike amniocentesis, CVS cannot detect alpha-fetoprotein levels in order to determine the risk for open neural tube defects    MSAFP testing would need to be performed a 15-20 weeks gestation for this purpose  The risk of CVS includes, but is not limited to, less than a 1 in 300 risk for pregnancy loss  There is also a 1% risk for maternal cell contamination and cell culture failure, in which case the CVS would need to be followed-up with amniocentesis  We reviewed the testing option of cell free fetal DNA screening (also known as noninvasive prenatal testing or NIPT)  We discussed that it is a serum test to identify fragments of fetal DNA in maternal blood  We reviewed the benefits and limitations of cell free fetal DNA screening in detecting Down syndrome, Trisomy 13, Trisomy 25 and sex chromosome aneuploidies  We also discussed that cell free fetal DNA screening does not detect additional chromosomal abnormalities and the possibility of a failed test result  As cell free fetal DNA screening does not detect open neural tube defects, MSAFP screening is available at 15-20 weeks gestation  We reviewed that level II anatomy ultrasound is typically performed at approximately 20 weeks gestation  Level II ultrasound evaluation is between 60-80% accurate in detecting major physical birth defects and variations in fetal development that may be associated with chromosome abnormalities  Level II ultrasound evaluation is not able to detect all birth defects or health problems  We also discussed the benefits and limitations of fetal echocardiogram     After discussing the available testing and screening options [facility] elected to pursue CVS   We reviewed that [address] results take approximately 3-5 days with microarray results taking an additional 7-10 days  If the microarray analysis is normal [place] syndrome testing can then be performed, pending insurance approval   [facility] is also planning on pursuing MSAFP, level II ultrasound and fetal echocardiogram at the appropriate times  Due to the nature of the session a complete detailed family history was not performed   The benefits and limitations of Cystic fibrosis (CF), Spinal muscular atrophy (SMA), and expanded carrier screening was discussed  The patient declined carrier screening at this time, but will contact us if she changes her mind  Hemoglobin electrophoresis to screen for hemoglobinopathies is recommended through her referring OB provider if not previously performed, as her partner is of  decent  Lastly, we discussed the fact that everyone in the general population regardless of age, family history, or medical background has approximately a 3-5% risk of having a child with some type of congenital anomaly, genetic disease or intellectual disability  Currently there are no tests available to rule out all birth defects or health problems  Natan was provided with our contact information  I encouraged her to call with any questions or concerns      Time spent with Genetic Counselor: 50 minutes    Plan / Tests Ordered:  1) Patient elected CVS - procedure performed after our counseling session  2) Patient declined amniocentesis and cell free fetal DNA testing  3) MSAFP screening at 15-20 weeks gestation  4) Level II ultrasound at approximately 20 weeks gestation  5) Fetal echocardiogram at 22-23 weeks gestation

## 2019-07-10 NOTE — PROGRESS NOTES
The amniotic fluid volume is normal   Good fetal movement and tone are appreciated today  The placenta is posterior and appears sonographically normal    The patient met with our genetic counselor, Zhane Griggs, today  (see separate genetic consultation letter)  After informed consent was obtained, chorionic villus sampling for fetal karyotype was performed without difficulty (see comments above)  Fetal heart motion was demonstrated pre and post procedure  Precautions were reviewed  The patient was informed of today's findings and all of her questions were answered  Recommend MSAFP at 16 weeks gestation, if the patient desires  Recommend a fetal anatomic survey at 18-20 weeks  Precautions were reviewed  Thank you very much for allowing us to participate in the care of this very nice patient  Should you have any questions, please do not hesitate to contact our office

## 2019-07-11 NOTE — PROGRESS NOTES
Late entry nurse note for 07/10/2019  CVS time out completed prior to procedure @ 1610, confirmation of patients blood type and allergies  Patient tolerated procedure well  Provided with post procedure verbal and written instructions  Patient and partner verbalized understanding  Maternal blood sample drawn for [facility] studies  CVS Specimen orders placed by Genetic Counselor Saurabh Sherwood

## 2019-07-11 NOTE — PROGRESS NOTES
I have reviewed the notes, assessments, and plan by Dimitrios Boyd, I concur with her documentation of Zackery Ferrell

## 2019-07-12 ENCOUNTER — TELEPHONE (OUTPATIENT)
Dept: PERINATAL CARE | Facility: CLINIC | Age: 34
End: 2019-07-12

## 2019-07-12 NOTE — TELEPHONE ENCOUNTER
Called Marcin at patient's request due to issues with her cell phone  Informed him of reassuring normal female results of fish analysis  All questions answered

## 2019-07-17 ENCOUNTER — TELEPHONE (OUTPATIENT)
Dept: PERINATAL CARE | Facility: CLINIC | Age: 34
End: 2019-07-17

## 2019-07-17 NOTE — TELEPHONE ENCOUNTER
Leesuad called MFM office, states she has been experiencing yellowish colored vaginal discharge every other day since she had CVS @MFM  Denies bleeding, no leaking of fluid, no itching, no external redness, no vaginal irritation  States occasional cramping  Denies n/v/d, no fever  Reviewed patient s/sx with Dr Patience Ross, patient offered MFM appointment 07/18/19 @ 11 am in [place] location with Dr Brock Briggs    Encouraged patient to also follow up with OB office  Phone call to [facility] and spoke with Alfred Fleischer, Dr Antonino Parisi on call and message forwarded

## 2019-07-18 ENCOUNTER — ULTRASOUND (OUTPATIENT)
Dept: PERINATAL CARE | Facility: CLINIC | Age: 34
End: 2019-07-18
Payer: COMMERCIAL

## 2019-07-18 VITALS
DIASTOLIC BLOOD PRESSURE: 76 MMHG | HEART RATE: 87 BPM | SYSTOLIC BLOOD PRESSURE: 106 MMHG | BODY MASS INDEX: 23.78 KG/M2 | HEIGHT: 66 IN | WEIGHT: 148 LBS

## 2019-07-18 DIAGNOSIS — Z3A.12 12 WEEKS GESTATION OF PREGNANCY: Primary | ICD-10-CM

## 2019-07-18 DIAGNOSIS — O28.3 INCREASED NUCHAL TRANSLUCENCY SPACE ON FETAL ULTRASOUND: ICD-10-CM

## 2019-07-18 PROCEDURE — 76815 OB US LIMITED FETUS(S): CPT | Performed by: OBSTETRICS & GYNECOLOGY

## 2019-07-18 NOTE — PROGRESS NOTES
Please refer to the [facility] ultrasound report in Ob Procedures for additional information regarding the visit to the [facility]  today

## 2019-07-18 NOTE — LETTER
July 18, 2019     Forrestine Dakin, MD  [facility] [address]    Patient: Leslee Méndez   YOB: 1985   Date of Visit: 7/18/2019       Dear Dr Mike Ang: Thank you for referring Katherine Rosa to me for evaluation  Below are my notes for this consultation  If you have questions, please do not hesitate to call me  I look forward to following your patient along with you  Sincerely,        Alannah Hayden MD        CC: No Recipients  Alannah Hayden MD  7/18/2019  6:21 PM  Sign at close encounter  Please refer to the [facility] ultrasound report in Ob Procedures for additional information regarding the visit to the [facility]  today

## 2019-07-19 LAB
ARRAY TYPE: NORMAL
CELLS ANALYZED CVS: 50
CELLS COUNTED CVS: 50
CHR 13+18+21+X+Y ANEUP BLD/T FISH: NORMAL
CHROM ANALY INTERPHASE BLD FISH-IMP: NORMAL
CHROMOSOME BLD/T: NORMAL
CLINICAL CYTOGENETICIST SPEC: NORMAL
DIAGNOSTIC IMP SPEC-IMP: NORMAL
GENE XXX MUT ANL BLD/T: NORMAL
GENOTYPING TARGETS: NORMAL
LAB DIRECTOR NAME PROVIDER: NORMAL
LAB DIRECTOR NAME PROVIDER: NORMAL
REF LAB TEST METHOD: NORMAL
SL AMB SPECIMEN TYPE: NORMAL
SPECIMEN SOURCE: NORMAL
SPECIMEN SOURCE: NORMAL

## 2019-07-22 ENCOUNTER — TELEPHONE (OUTPATIENT)
Dept: PERINATAL CARE | Facility: CLINIC | Age: 34
End: 2019-07-22

## 2019-07-22 NOTE — TELEPHONE ENCOUNTER
Received patient's microarray test results which were normal   Telephone call to patient  She confirmed date of birth  Reported normal results  Explained that this rules out Down syndrome and the other trisomy is in addition to ruling out deletion duplication is of genetic material throughout the chromosomes  Patient is scheduled for an early anatomy scan at 12 weeks  Explained that they will assess the evolution of the nuchal translucency at that time and be able to provide further information about prognosis for the pregnancy  Patient has no additional questions at this time  She was encouraged to call back with any additional questions

## 2019-07-22 NOTE — TELEPHONE ENCOUNTER
Prior authorization received for microarray and Charmaine syndrome panel to be performed if necessary  Jenn Houser #[id number]  Microarray resulted today normal therefore would like to reflex to [place] syndrome panel  [address] [place] [address] and spoke to , Harley Lenz, who will have a genetic counselor call back to coordinate testing

## 2019-07-23 ENCOUNTER — TELEPHONE (OUTPATIENT)
Dept: PERINATAL CARE | Facility: CLINIC | Age: 34
End: 2019-07-23

## 2019-07-23 NOTE — TELEPHONE ENCOUNTER
[facility], [address] from [facility] called to confirm add on for [place] testing as microarray was normal   She will inform lab to start testing and will fax us test add on form to sign and send back

## 2019-07-24 ENCOUNTER — ROUTINE PRENATAL (OUTPATIENT)
Dept: OBGYN CLINIC | Facility: CLINIC | Age: 34
End: 2019-07-24
Payer: COMMERCIAL

## 2019-07-24 ENCOUNTER — TELEPHONE (OUTPATIENT)
Dept: PERINATAL CARE | Facility: CLINIC | Age: 34
End: 2019-07-24

## 2019-07-24 VITALS — DIASTOLIC BLOOD PRESSURE: 60 MMHG | SYSTOLIC BLOOD PRESSURE: 90 MMHG | WEIGHT: 150 LBS | BODY MASS INDEX: 24.21 KG/M2

## 2019-07-24 DIAGNOSIS — Z34.82 PRENATAL CARE, SUBSEQUENT PREGNANCY, SECOND TRIMESTER: Primary | ICD-10-CM

## 2019-07-24 PROCEDURE — 99213 OFFICE O/P EST LOW 20 MIN: CPT | Performed by: NURSE PRACTITIONER

## 2019-07-24 NOTE — PROGRESS NOTES
OFFICE VISIT: Denies any N/V, HA, Cramping, VB, LOF, Edema, Domestic Violence, Smoking  No FM yet  Tolerating PNV  Has early anatomy scan at 16 weeks due to increased nuchal translucency and negative FISH and CVS  RTO 4 weeks or sooner as needed

## 2019-07-24 NOTE — TELEPHONE ENCOUNTER
Called patient, confirmed date of birth  Informed her that [place] testing will be performed as the microarray was normal   Patient agreed and she will be contacted when results are available

## 2019-08-13 ENCOUNTER — TELEPHONE (OUTPATIENT)
Dept: PERINATAL CARE | Facility: CLINIC | Age: 34
End: 2019-08-13

## 2019-08-13 NOTE — TELEPHONE ENCOUNTER
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

## 2019-08-14 NOTE — PATIENT INSTRUCTIONS
Thank you for choosing [id number] [address] for your  care today  If you have any questions about your ultrasound or care, please do not hesitate to contact us or your primary obstetrician

## 2019-08-15 ENCOUNTER — ULTRASOUND (OUTPATIENT)
Dept: PERINATAL CARE | Facility: CLINIC | Age: 34
End: 2019-08-15
Payer: COMMERCIAL

## 2019-08-15 ENCOUNTER — DOCUMENTATION (OUTPATIENT)
Dept: PERINATAL CARE | Facility: CLINIC | Age: 34
End: 2019-08-15

## 2019-08-15 VITALS
BODY MASS INDEX: 25.71 KG/M2 | WEIGHT: 160 LBS | SYSTOLIC BLOOD PRESSURE: 123 MMHG | DIASTOLIC BLOOD PRESSURE: 69 MMHG | HEIGHT: 66 IN | HEART RATE: 87 BPM

## 2019-08-15 DIAGNOSIS — Z36.3 ENCOUNTER FOR ANTENATAL SCREENING FOR MALFORMATIONS: ICD-10-CM

## 2019-08-15 DIAGNOSIS — O28.3 INCREASED NUCHAL TRANSLUCENCY SPACE ON FETAL ULTRASOUND: Primary | ICD-10-CM

## 2019-08-15 PROCEDURE — 76805 OB US >/= 14 WKS SNGL FETUS: CPT | Performed by: OBSTETRICS & GYNECOLOGY

## 2019-08-15 PROCEDURE — 99212 OFFICE O/P EST SF 10 MIN: CPT | Performed by: OBSTETRICS & GYNECOLOGY

## 2019-08-15 NOTE — PROGRESS NOTES
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  It did not detect any other mutations in the genes tested  Reviewed that this finding does not mean the baby definitely has Charmaine syndrome, but it cannot be excluded  We discussed the morbidity and mortality of [place] syndrome and that a referral to pediatric genetics is available postnatally  We also discussed that testing of the parents for the SOS1 mutation is available to help determine if it is a de al mutation in the pregnancy, which is more likely to be causative  Natan expressed interest in parental testing but wanted to know cost prior to proceeding  I will contact the lab to get pricing and call the patient to arrange their blood draws if still interested  We then reviewed that her pregnancy management would remain the same, with her level II anatomy ultrasound scheduled for 20 weeks and a fetal echocardiogram at 22-23 weeks gestation  Timing of subsequent growth ultrasounds will be determined by M after her fetal echo  Take home information from [employer] Reference on Charmaine syndrome was provided to the patient  She had no further questions at this time but was encouraged to call with any concerns

## 2019-08-15 NOTE — PROGRESS NOTES
[address]: Ms Heike Castillo was seen today at 16w2d for anatomic survey ultrasound  See ultrasound report under "OB Procedures" tab  Please don't hesitate to contact our office with any concerns or questions    Rhonda Acuna MD

## 2019-09-06 ENCOUNTER — ROUTINE PRENATAL (OUTPATIENT)
Dept: OBGYN CLINIC | Facility: CLINIC | Age: 34
End: 2019-09-06
Payer: COMMERCIAL

## 2019-09-06 VITALS — SYSTOLIC BLOOD PRESSURE: 98 MMHG | WEIGHT: 172 LBS | DIASTOLIC BLOOD PRESSURE: 70 MMHG | BODY MASS INDEX: 27.76 KG/M2

## 2019-09-06 DIAGNOSIS — Z34.82 PRENATAL CARE, SUBSEQUENT PREGNANCY, SECOND TRIMESTER: Primary | ICD-10-CM

## 2019-09-06 PROCEDURE — 99214 OFFICE O/P EST MOD 30 MIN: CPT | Performed by: NURSE PRACTITIONER

## 2019-09-06 NOTE — PROGRESS NOTES
OFFICE VISIT: Denies any N/V, HA, Cramping, VB, LOF, Edema, Domestic Violence, Smoking  + FM  Tolerating PNV  Has follow up with [facility] next week  RTO 4 weeks or sooner as needed      Urine Glucose Negative  Urine Protein Negative

## 2019-09-12 ENCOUNTER — ROUTINE PRENATAL (OUTPATIENT)
Dept: PERINATAL CARE | Facility: CLINIC | Age: 34
End: 2019-09-12
Payer: COMMERCIAL

## 2019-09-12 VITALS
WEIGHT: 173.6 LBS | DIASTOLIC BLOOD PRESSURE: 68 MMHG | BODY MASS INDEX: 27.9 KG/M2 | HEART RATE: 99 BPM | HEIGHT: 66 IN | SYSTOLIC BLOOD PRESSURE: 100 MMHG

## 2019-09-12 DIAGNOSIS — Z36.86 ENCOUNTER FOR ANTENATAL SCREENING FOR CERVICAL LENGTH: ICD-10-CM

## 2019-09-12 DIAGNOSIS — Z3A.20 20 WEEKS GESTATION OF PREGNANCY: Primary | ICD-10-CM

## 2019-09-12 DIAGNOSIS — O28.3 INCREASED NUCHAL TRANSLUCENCY SPACE ON FETAL ULTRASOUND: ICD-10-CM

## 2019-09-12 DIAGNOSIS — Z36.82 ENCOUNTER FOR NUCHAL TRANSLUCENCY TESTING: ICD-10-CM

## 2019-09-12 PROBLEM — Z36.0: Status: RESOLVED | Noted: 2019-07-10 | Resolved: 2019-09-12

## 2019-09-12 PROCEDURE — 99212 OFFICE O/P EST SF 10 MIN: CPT | Performed by: OBSTETRICS & GYNECOLOGY

## 2019-09-12 PROCEDURE — 76811 OB US DETAILED SNGL FETUS: CPT | Performed by: OBSTETRICS & GYNECOLOGY

## 2019-09-12 PROCEDURE — 76817 TRANSVAGINAL US OBSTETRIC: CPT | Performed by: OBSTETRICS & GYNECOLOGY

## 2019-09-12 NOTE — PROGRESS NOTES
A transvaginal ultrasound was performed  Sonographer note on use of High Level Disinfection Process (Trophon) for transvaginal probe# 4  used, serial # [id number]    Gregg Reeder RDMS

## 2019-09-12 NOTE — PROGRESS NOTES
The patient was seen today for an ultrasound  Please see ultrasound report (located under Ob Procedures) for additional details  Thank you very much for allowing us to participate in the care of this very nice patient  Should you have any questions, please do not hesitate to contact me  Marcos Asher MD [address] [facility]  Attending Physician, Shahnaz

## 2019-09-27 ENCOUNTER — ROUTINE PRENATAL (OUTPATIENT)
Dept: PERINATAL CARE | Facility: CLINIC | Age: 34
End: 2019-09-27
Payer: COMMERCIAL

## 2019-09-27 VITALS
DIASTOLIC BLOOD PRESSURE: 72 MMHG | HEART RATE: 99 BPM | SYSTOLIC BLOOD PRESSURE: 104 MMHG | WEIGHT: 182.2 LBS | HEIGHT: 66 IN | BODY MASS INDEX: 29.28 KG/M2

## 2019-09-27 DIAGNOSIS — Z3A.22 22 WEEKS GESTATION OF PREGNANCY: ICD-10-CM

## 2019-09-27 DIAGNOSIS — O28.3 INCREASED NUCHAL TRANSLUCENCY SPACE ON FETAL ULTRASOUND: ICD-10-CM

## 2019-09-27 PROCEDURE — 76827 ECHO EXAM OF FETAL HEART: CPT | Performed by: OBSTETRICS & GYNECOLOGY

## 2019-09-27 PROCEDURE — 93325 DOPPLER ECHO COLOR FLOW MAPG: CPT | Performed by: OBSTETRICS & GYNECOLOGY

## 2019-09-27 PROCEDURE — 76825 ECHO EXAM OF FETAL HEART: CPT | Performed by: OBSTETRICS & GYNECOLOGY

## 2019-09-27 PROCEDURE — 99213 OFFICE O/P EST LOW 20 MIN: CPT | Performed by: OBSTETRICS & GYNECOLOGY

## 2019-09-27 NOTE — PROGRESS NOTES
The patient was seen today for an ultrasound  Please see ultrasound report (located under Ob Procedures) for additional details  Thank you very much for allowing us to participate in the care of this very nice patient  Should you have any questions, please do not hesitate to contact me  Marcos Ordoñez MD [address] [facility]  Attending Physician, Shahnaz

## 2019-10-09 ENCOUNTER — ROUTINE PRENATAL (OUTPATIENT)
Dept: OBGYN CLINIC | Facility: CLINIC | Age: 34
End: 2019-10-09
Payer: COMMERCIAL

## 2019-10-09 VITALS — BODY MASS INDEX: 30.51 KG/M2 | DIASTOLIC BLOOD PRESSURE: 54 MMHG | SYSTOLIC BLOOD PRESSURE: 92 MMHG | WEIGHT: 189 LBS

## 2019-10-09 DIAGNOSIS — Z34.82 PRENATAL CARE, SUBSEQUENT PREGNANCY, SECOND TRIMESTER: Primary | ICD-10-CM

## 2019-10-09 PROCEDURE — 99213 OFFICE O/P EST LOW 20 MIN: CPT | Performed by: NURSE PRACTITIONER

## 2019-10-09 NOTE — PROGRESS NOTES
OFFICE VISIT: Denies any N/V, HA, Cramping, VB, LOF, Edema, Domestic Violence, Smoking  + FM  Tolerating PNV  28 wk lab work given  RTO 4 weeks or sooner as needed

## 2019-10-14 ENCOUNTER — TELEPHONE (OUTPATIENT)
Dept: PERINATAL CARE | Facility: CLINIC | Age: 34
End: 2019-10-14

## 2019-10-14 NOTE — TELEPHONE ENCOUNTER
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

## 2019-11-11 ENCOUNTER — ULTRASOUND (OUTPATIENT)
Dept: PERINATAL CARE | Facility: CLINIC | Age: 34
End: 2019-11-11
Payer: COMMERCIAL

## 2019-11-11 VITALS
SYSTOLIC BLOOD PRESSURE: 110 MMHG | HEART RATE: 114 BPM | HEIGHT: 66 IN | BODY MASS INDEX: 32.11 KG/M2 | DIASTOLIC BLOOD PRESSURE: 78 MMHG | WEIGHT: 199.8 LBS

## 2019-11-11 DIAGNOSIS — Z3A.28 28 WEEKS GESTATION OF PREGNANCY: ICD-10-CM

## 2019-11-11 DIAGNOSIS — O28.3 INCREASED NUCHAL TRANSLUCENCY SPACE ON FETAL ULTRASOUND: Primary | ICD-10-CM

## 2019-11-11 PROCEDURE — 99212 OFFICE O/P EST SF 10 MIN: CPT | Performed by: OBSTETRICS & GYNECOLOGY

## 2019-11-11 PROCEDURE — 76816 OB US FOLLOW-UP PER FETUS: CPT | Performed by: OBSTETRICS & GYNECOLOGY

## 2019-11-11 NOTE — LETTER
November 11, 2019     Miryam Oconnor MD  [facility] [address]    Patient: Terence Talley   YOB: 1985   Date of Visit: 11/11/2019       Dear Dr Jimy Vernon: Thank you for referring Sergio Menon to me for evaluation  Below are my notes for this consultation  If you have questions, please do not hesitate to call me  I look forward to following your patient along with you  Sincerely,        Delmy Mccann MD        CC: No Recipients  Delmy Mccann MD  11/11/2019  9:55 AM  Sign at close encounter  Please refer to the [facility] ultrasound report in Ob Procedures for additional information regarding the visit to the [facility]  today

## 2019-11-11 NOTE — PATIENT INSTRUCTIONS
Kick Counts in Pregnancy   WHAT YOU NEED TO KNOW:   Kick counts measure how much your baby is moving in your womb  A kick from your baby can be felt as a twist, turn, swish, roll, or jab  It is common to feel your baby kicking at 26 to 28 weeks of pregnancy  You may feel your baby kick as early as 20 weeks of pregnancy  DISCHARGE INSTRUCTIONS:   Return to the emergency department if:   · You feel your baby kick less as the day goes on      · You do not feel any kicks in a day  Contact your healthcare provider if:   · You feel a change in the number of kicks or movements of your baby  · You feel fewer than 10 kicks within 2 hours after counting twice  · You have questions or concerns about your baby's movements  Why measure kick counts:  Your baby's movement may provide information about your baby's health  He may move less, or not at all, if there are problems  He may move less if he does not have enough room to grow in your uterus (womb)  He may also move less if he is not getting enough oxygen or nutrition from the placenta  Tell your healthcare provider as soon as you feel a change in your baby's movements  Problems that are found earlier are easier to treat  When to measure kick counts:   · Measure kick counts at the same time every day  · Measure kick counts when your baby is awake and most active  Your baby may be most active in the evening  · Measure kick counts after a meal or snack  Your baby may be more active after you eat  Wait 2 hours after you drink liquids that contain caffeine  Caffeine can make your baby more active than usual     · You should not smoke while you are pregnant  Smoking increases the risk of health problems for you and for your baby during your pregnancy  If you do smoke, wait 2 hours to measure kick counts  Nicotine can make your baby more active than usual   How to measure kick counts:  Check that your baby is awake before you measure kick counts   You can wake up your baby by lightly pushing on your belly, walking, or drinking something cold  Your healthcare provider may tell you different ways to measure kick counts  He may tell you to do the following:  · Use a chart or clock to keep track of the time you start and finish counting  · Sit in a chair or lie on your left side  · Place your hands on the largest part of your belly  · Count until you reach 10 kicks  Write down how much time it takes to count 10 kicks  · It may take 30 minutes to 2 hours to count 10 kicks  It should not take more than 2 hours to count 10 kicks  · If you do not feel 10 kicks within 2 hours, wait 1 hour and count again  Your baby can sleep for up to 40 minutes at one time  Follow up with your healthcare provider as directed:  Write down your questions so you remember to ask them during your visits  © 2017 [address] [employer] Information is for End User's use only and may not be sold, redistributed or otherwise used for commercial purposes  All illustrations and images included in CareNotes® are the copyrighted property of [employer]  or Pavel Cervantes  The above information is an  only  It is not intended as medical advice for individual conditions or treatments  Talk to your doctor, nurse or pharmacist before following any medical regimen to see if it is safe and effective for you

## 2019-11-11 NOTE — PROGRESS NOTES
Please refer to the [facility] ultrasound report in Ob Procedures for additional information regarding the visit to the [facility]  today

## 2019-11-22 ENCOUNTER — HOSPITAL ENCOUNTER (EMERGENCY)
Facility: HOSPITAL | Age: 34
Discharge: ANOTHER HOSPITAL | End: 2019-11-22
Attending: EMERGENCY MEDICINE | Admitting: EMERGENCY MEDICINE
Payer: COMMERCIAL

## 2019-11-22 ENCOUNTER — HOSPITAL ENCOUNTER (OUTPATIENT)
Facility: HOSPITAL | Age: 34
Discharge: HOME | End: 2019-11-22
Attending: OBSTETRICS & GYNECOLOGY | Admitting: OBSTETRICS & GYNECOLOGY
Payer: COMMERCIAL

## 2019-11-22 VITALS
TEMPERATURE: 98.1 F | RESPIRATION RATE: 18 BRPM | SYSTOLIC BLOOD PRESSURE: 108 MMHG | WEIGHT: 209.44 LBS | OXYGEN SATURATION: 100 % | BODY MASS INDEX: 33.8 KG/M2 | HEART RATE: 65 BPM | DIASTOLIC BLOOD PRESSURE: 63 MMHG

## 2019-11-22 VITALS
HEART RATE: 112 BPM | DIASTOLIC BLOOD PRESSURE: 63 MMHG | RESPIRATION RATE: 18 BRPM | TEMPERATURE: 98 F | SYSTOLIC BLOOD PRESSURE: 122 MMHG

## 2019-11-22 DIAGNOSIS — O26.899 ABDOMINAL PAIN IN PREGNANCY: Primary | ICD-10-CM

## 2019-11-22 DIAGNOSIS — R10.9 ABDOMINAL PAIN IN PREGNANCY: Primary | ICD-10-CM

## 2019-11-22 PROBLEM — Z3A.30 30 WEEKS GESTATION OF PREGNANCY: Status: ACTIVE | Noted: 2019-11-22

## 2019-11-22 LAB
ALBUMIN SERPL BCP-MCNC: 2.8 G/DL (ref 3.5–5)
ALP SERPL-CCNC: 84 U/L (ref 46–116)
ALT SERPL W P-5'-P-CCNC: 11 U/L (ref 12–78)
ANION GAP SERPL CALCULATED.3IONS-SCNC: 9 MMOL/L (ref 4–13)
APTT PPP: 26 SECONDS (ref 23–37)
AST SERPL W P-5'-P-CCNC: 12 U/L (ref 5–45)
BASOPHILS # BLD MANUAL: 0.17 THOUSAND/UL (ref 0–0.1)
BASOPHILS NFR MAR MANUAL: 1 % (ref 0–1)
BILIRUB SERPL-MCNC: 0.2 MG/DL (ref 0.2–1)
BILIRUB UR QL STRIP: NEGATIVE
BUN SERPL-MCNC: 3 MG/DL (ref 5–25)
CALCIUM SERPL-MCNC: 8 MG/DL (ref 8.3–10.1)
CHLORIDE SERPL-SCNC: 104 MMOL/L (ref 100–108)
CLARITY UR: NORMAL
CO2 SERPL-SCNC: 24 MMOL/L (ref 21–32)
COLOR UR: YELLOW
CREAT SERPL-MCNC: 0.44 MG/DL (ref 0.6–1.3)
EOSINOPHIL # BLD MANUAL: 0.52 THOUSAND/UL (ref 0–0.4)
EOSINOPHIL NFR BLD MANUAL: 3 % (ref 0–6)
ERYTHROCYTE [DISTWIDTH] IN BLOOD BY AUTOMATED COUNT: 13.7 % (ref 11.6–15.1)
GFR SERPL CREATININE-BSD FRML MDRD: 132 ML/MIN/1.73SQ M
GLUCOSE SERPL-MCNC: 87 MG/DL (ref 65–140)
GLUCOSE UR STRIP-MCNC: NEGATIVE MG/DL
HCT VFR BLD AUTO: 30.7 % (ref 34.8–46.1)
HGB BLD-MCNC: 10.1 G/DL (ref 11.5–15.4)
HGB UR QL STRIP.AUTO: NEGATIVE
INR PPP: 0.9 (ref 0.91–1.09)
KETONES UR STRIP-MCNC: NEGATIVE MG/DL
LEUKOCYTE ESTERASE UR QL STRIP: NEGATIVE
LYMPHOCYTES # BLD AUTO: 20 % (ref 14–44)
LYMPHOCYTES # BLD AUTO: 3.47 THOUSAND/UL (ref 0.6–4.47)
MAGNESIUM SERPL-MCNC: 1.8 MG/DL (ref 1.6–2.6)
MCH RBC QN AUTO: 30.2 PG (ref 26.8–34.3)
MCHC RBC AUTO-ENTMCNC: 32.9 G/DL (ref 31.4–37.4)
MCV RBC AUTO: 92 FL (ref 82–98)
METAMYELOCYTES NFR BLD MANUAL: 1 % (ref 0–1)
MONOCYTES # BLD AUTO: 0.69 THOUSAND/UL (ref 0–1.22)
MONOCYTES NFR BLD: 4 % (ref 4–12)
NEUTROPHILS # BLD MANUAL: 12.32 THOUSAND/UL (ref 1.85–7.62)
NEUTS BAND NFR BLD MANUAL: 2 % (ref 0–8)
NEUTS SEG NFR BLD AUTO: 69 % (ref 43–75)
NITRITE UR QL STRIP: NEGATIVE
NRBC BLD AUTO-RTO: 0 /100 WBCS
PH UR STRIP.AUTO: 7 [PH]
PLATELET # BLD AUTO: 238 THOUSANDS/UL (ref 149–390)
PLATELET BLD QL SMEAR: ADEQUATE
PMV BLD AUTO: 10 FL (ref 8.9–12.7)
POTASSIUM SERPL-SCNC: 3.6 MMOL/L (ref 3.5–5.3)
PROT SERPL-MCNC: 6.7 G/DL (ref 6.4–8.2)
PROT UR STRIP-MCNC: NEGATIVE MG/DL
PROTHROMBIN TIME: 9.7 SECONDS (ref 9.8–12)
RBC # BLD AUTO: 3.34 MILLION/UL (ref 3.81–5.12)
RBC MORPH BLD: NORMAL
SODIUM SERPL-SCNC: 137 MMOL/L (ref 136–145)
SP GR UR STRIP.AUTO: 1.01 (ref 1–1.03)
T4 FREE SERPL-MCNC: 0.97 NG/DL (ref 0.76–1.46)
TOTAL CELLS COUNTED SPEC: 100
TSH SERPL DL<=0.05 MIU/L-ACNC: 9.59 UIU/ML (ref 0.36–3.74)
UROBILINOGEN UR QL STRIP.AUTO: 0.2 E.U./DL
WBC # BLD AUTO: 17.35 THOUSAND/UL (ref 4.31–10.16)

## 2019-11-22 PROCEDURE — 85610 PROTHROMBIN TIME: CPT | Performed by: PHYSICIAN ASSISTANT

## 2019-11-22 PROCEDURE — 96360 HYDRATION IV INFUSION INIT: CPT

## 2019-11-22 PROCEDURE — 84443 ASSAY THYROID STIM HORMONE: CPT | Performed by: PHYSICIAN ASSISTANT

## 2019-11-22 PROCEDURE — 81003 URINALYSIS AUTO W/O SCOPE: CPT | Performed by: PHYSICIAN ASSISTANT

## 2019-11-22 PROCEDURE — NC001 PR NO CHARGE: Performed by: OBSTETRICS & GYNECOLOGY

## 2019-11-22 PROCEDURE — 80053 COMPREHEN METABOLIC PANEL: CPT | Performed by: PHYSICIAN ASSISTANT

## 2019-11-22 PROCEDURE — 85027 COMPLETE CBC AUTOMATED: CPT | Performed by: PHYSICIAN ASSISTANT

## 2019-11-22 PROCEDURE — 85730 THROMBOPLASTIN TIME PARTIAL: CPT | Performed by: PHYSICIAN ASSISTANT

## 2019-11-22 PROCEDURE — 99214 OFFICE O/P EST MOD 30 MIN: CPT

## 2019-11-22 PROCEDURE — 36415 COLL VENOUS BLD VENIPUNCTURE: CPT | Performed by: PHYSICIAN ASSISTANT

## 2019-11-22 PROCEDURE — 99285 EMERGENCY DEPT VISIT HI MDM: CPT

## 2019-11-22 PROCEDURE — 85007 BL SMEAR W/DIFF WBC COUNT: CPT | Performed by: PHYSICIAN ASSISTANT

## 2019-11-22 PROCEDURE — 87086 URINE CULTURE/COLONY COUNT: CPT | Performed by: PHYSICIAN ASSISTANT

## 2019-11-22 PROCEDURE — 84439 ASSAY OF FREE THYROXINE: CPT | Performed by: PHYSICIAN ASSISTANT

## 2019-11-22 PROCEDURE — 83735 ASSAY OF MAGNESIUM: CPT | Performed by: PHYSICIAN ASSISTANT

## 2019-11-22 RX ADMIN — SODIUM CHLORIDE 1000 ML: 0.9 INJECTION, SOLUTION INTRAVENOUS at 10:27

## 2019-11-22 NOTE — EMTALA/ACUTE CARE TRANSFER
[address] [facility] [address]  [place] [address]  Dept: [phone]      EMTALA TRANSFER CONSENT    NAME Lexi Arrington                                         1985                              MRN [id number]    I have been informed of my rights regarding examination, treatment, and transfer   by Dr Gibson Nguyen DO    Benefits: Specialized equipment and/or services available at the receiving facility (Include comment)________________________    Risks: Potential for delay in receiving treatment      Transfer Request   I acknowledge that my medical condition has been evaluated and explained to me by the emergency department physician or other qualified medical person and/or my attending physician who has recommended and offered to me further medical examination and treatment  I understand the Hospital's obligation with respect to the treatment and stabilization of my emergency medical condition  I nevertheless request to be transferred  I release the Hospital, the doctor, and any other persons caring for me from all responsibility or liability for any injury or ill effects that may result from my transfer and agree to accept all responsibility for the consequences of my choice to transfer, rather than receive stabilizing treatment at the Hospital  I understand that because the transfer is my request, my insurance may not provide reimbursement for the services  The Hospital will assist and direct me and my family in how to make arrangements for transfer, but the hospital is not liable for any fees charged by the transport service  In spite of this understanding, I refuse to consent to further medical examination and treatment which has been offered to me, and request transfer to  [facility] Name, [address]   I authorize the performance of emergency medical procedures and treatments upon me in both transit and upon arrival at the receiving facility  Additionally, I authorize the release of any and all medical records to the receiving facility and request they be transported with me, if possible  I authorize the performance of emergency medical procedures and treatments upon me in both transit and upon arrival at the receiving facility  Additionally, I authorize the release of any and all medical records to the receiving facility and request they be transported with me, if possible  I understand that the safest mode of transportation during a medical emergency is an ambulance and that the Hospital advocates the use of this mode of transport  Risks of traveling to the receiving facility by car, including absence of medical control, life sustaining equipment, such as oxygen, and medical personnel has been explained to me and I fully understand them  (TERESA CORRECT BOX BELOW)  [  ]  I consent to the stated transfer and to be transported by ambulance/helicopter  [  ]  I consent to the stated transfer, but refuse transportation by ambulance and accept full responsibility for my transportation by car  I understand the risks of non-ambulance transfers and I exonerate the Hospital and its staff from any deterioration in my condition that results from this refusal     X___________________________________________    DATE  19  TIME________  Signature of patient or legally responsible individual signing on patient behalf           RELATIONSHIP TO PATIENT_________________________          Provider Certification    NAME Hollie Mora                                         1985                              MRN [id number]    A medical screening exam was performed on the above named patient  Based on the examination:    Condition Necessitating Transfer The encounter diagnosis was Abdominal pain in pregnancy      Patient Condition: The patient has been stabilized such that within reasonable medical probability, no material deterioration of the patient condition or the condition of the unborn child(yosvany) is likely to result from the transfer    Reason for Transfer: Level of Care needed not available at this facility    Transfer Requirements: [address]   · Space available and qualified personnel available for treatment as acknowledged by    · Agreed to accept transfer and to provide appropriate medical treatment as acknowledged by       Dr Renita Pan  · Appropriate medical records of the examination and treatment of the patient are provided at the time of transfer   [address] [facility] _______  · Transfer will be performed by qualified personnel from    and appropriate transfer equipment as required, including the use of necessary and appropriate life support measures  Provider Certification: I have examined the patient and explained the following risks and benefits of being transferred/refusing transfer to the patient/family:  General risk, such as traffic hazards, adverse weather conditions, rough terrain or turbulence, possible failure of equipment (including vehicle or aircraft), or consequences of actions of persons outside the control of the transport personnel      Based on these reasonable risks and benefits to the patient and/or the unborn child(yosvany), and based upon the information available at the time of the patients examination, I certify that the medical benefits reasonably to be expected from the provision of appropriate medical treatments at another medical facility outweigh the increasing risks, if any, to the individuals medical condition, and in the case of labor to the unborn child, from effecting the transfer      X____________________________________________ DATE 11/22/19        TIME_______      ORIGINAL - SEND TO MEDICAL RECORDS   COPY - SEND WITH PATIENT DURING TRANSFER

## 2019-11-22 NOTE — PROGRESS NOTES
L&D Triage Note - OB/GYN  Rocio Lam 29 y o  female MRN: [id number]  Unit/Bed#: LD PACU-03 Encounter: [id number]      Assessment:  29 y o  [id number] at 30w3d who presents not in  labor, with no cervical change on SVE, and cervical length 3 27cm  No contractions on monitor  Plan:  1  Stable for discharge home  Discussed  labor precautions  Recommended that patient continues to hydrate well  2  Patient counseled to take tylenol, warm/cold compresses, and abdominal binder for pain relief  3  D/c home with labor precautions      ______________________________________________________________________      Chief Compliant: Pelvic pressure    Subjective:  29 y o  [id number] at 30w3d who presents complaining of pelvic pressure and back pain  She states the pain is such that she has trouble walking  She also complains of dizziness for the past few weeks that will occur sporadically, often when she is up and walking around  She reports having some tightening sensations that do not feel like contractions  She denies any vaginal bleeding, leakage of fluid, or decreased fetal movement  She denies fevers, chills, cough, chest pain, shortness of breath, abdominal pain, or dysuria         Objective:  Vitals:    19 1248   BP: 122/63   Pulse: (!) 112   Resp: 18   Temp: 98 °F (36 7 °C)       SVE: 0 5 / 10% / -2  FHT:  130 / Moderate 6 - 25 bpm / Acceleration, no decelerations  [place]: none    TVUS:    Cervical length: 3 27   Vertex: yes   No funneling, No dynamic changes    Lab Results   Component Value Date    WBC 17 35 (H) 2019    HGB 10 1 (L) 2019    HCT 30 7 (L) 2019    MCV 92 2019     2019     Lab Results   Component Value Date    SODIUM 137 2019    K 3 6 2019     2019    CO2 24 2019    AGAP 9 2019    BUN 3 (L) 2019    CREATININE 0 44 (L) 2019    GLUC 87 2019    CALCIUM 8 0 (L) 2019    AST 12 2019    ALT 11 (L) 11/22/2019    ALKPHOS 84 11/22/2019    TP 6 7 11/22/2019    TBILI 0 20 11/22/2019    EGFR 132 11/22/2019     Lab Results   Component Value Date    INR 0 90 (L) 11/22/2019    PROTIME 9 7 (L) 11/22/2019     UA: negative    Discussed with Dr Keaton Hernandez MD, PGY-2  11/22/2019  2:49 PM

## 2019-11-22 NOTE — ED PROVIDER NOTES
History  Chief Complaint   Patient presents with    Pelvic Pain - Pregnant     c/o low back pain and pelvic pain that started yesterday got worse about 40 mins ago, denies vaginal discharge     29year old female hx hashimoto thyroiditis, PCOS, A4 at 30wk pregnant presents with abdominal pain x1 day  She states she started to feel abdominal pressure yesterday and believes her baby dropped today  She states she has had a constant pressure sensation in lower abdomen as well as her back  She states when her baby moves, it feels like a sharp stabbing pain  She describes it as a stabbing sensation at her cervix  She does not believe these are contractions  No loss of fluids or vaginal discharge  No vaginal discharge  No fever or chills  No chest pain, difficulty breathing, shortness of breath  No nausea, vomiting, diarrhea, constipation  No headache, dizziness, lightheadedness, weakness  Prior to Admission Medications   Prescriptions Last Dose Informant Patient Reported? Taking? Prenatal Vit-Iron Carbonyl-FA (PRENATAL MULTIVITAMIN) TABS  Self Yes No   Sig: Take 1 tablet by mouth daily   SYNTHROID 200 MCG tablet  Self Yes No   Sig: Take 200 mcg by mouth daily      Facility-Administered Medications: None       Past Medical History:   Diagnosis Date    Anxiety     Chronic pain     left hip    Ectopic pregnancy with intrauterine pregnancy      and     Hashimoto's disease     Hashimoto's thyroiditis     IBS (irritable bowel syndrome)     Pituitary tumor     Polycystic ovarian syndrome        Past Surgical History:   Procedure Laterality Date     SECTION      DERMOID CYST  EXCISION      ECTOPIC PREGNANCY SURGERY         Family History   Problem Relation Age of Onset    Diabetes Brother     Heart disease Maternal Grandmother      I have reviewed and agree with the history as documented      Social History     Tobacco Use    Smoking status: Former Smoker     Packs/day: 0 00 Types: Cigarettes    Smokeless tobacco: Never Used   Substance Use Topics    Alcohol use: No    Drug use: No        Review of Systems   Constitutional: Negative for chills and fever  HENT: Negative for sneezing and sore throat  Respiratory: Negative for cough and shortness of breath  Cardiovascular: Negative for chest pain, palpitations and leg swelling  Gastrointestinal: Positive for abdominal pain  Negative for constipation, diarrhea, nausea and vomiting  Genitourinary: Positive for pelvic pain  Negative for decreased urine volume, difficulty urinating, dyspareunia, dysuria, flank pain, frequency, genital sores, hematuria, menstrual problem, urgency, vaginal bleeding, vaginal discharge and vaginal pain  Musculoskeletal: Negative for back pain, gait problem and joint swelling  Skin: Negative for color change, pallor, rash and wound  Neurological: Negative for dizziness, syncope, weakness, light-headedness, numbness and headaches  All other systems reviewed and are negative  Physical Exam  Physical Exam   Constitutional: She appears well-developed and well-nourished  No distress  HENT:   Head: Normocephalic and atraumatic  Nose: Nose normal    Eyes: EOM are normal    Neck: Normal range of motion  Cardiovascular: Normal rate, regular rhythm, normal heart sounds and intact distal pulses  Exam reveals no gallop and no friction rub  No murmur heard  Pulmonary/Chest: Effort normal and breath sounds normal  No stridor  No respiratory distress  She has no wheezes  She has no rales  Sp02 is 100% indicating adequate oxygenation on room air   Abdominal: Soft  Bowel sounds are normal  She exhibits distension  She exhibits no mass  There is tenderness in the right lower quadrant and left lower quadrant  There is no rigidity, no rebound, no guarding and no CVA tenderness  Abdomen distended, palpable uterus well above umbilicus  Generalized discomfort to lower abdomen palpation  Genitourinary: Pelvic exam was performed with patient supine  There is no rash, tenderness, lesion or injury on the right labia  There is no rash, tenderness, lesion or injury on the left labia  Genitourinary Comments: Bimanual exam performed with Enma Kate RN as chaperone  Cervical os closed on exam    No vaginal bleeding or discharge  Skin: Skin is warm and dry  Capillary refill takes less than 2 seconds  No rash noted  She is not diaphoretic  No erythema  No pallor  Nursing note and vitals reviewed  Vital Signs  ED Triage Vitals   Temperature Pulse Respirations Blood Pressure SpO2   11/22/19 1012 11/22/19 1012 11/22/19 1012 11/22/19 1015 11/22/19 1012   97 5 °F (36 4 °C) 63 18 114/56 100 %      Temp Source Heart Rate Source Patient Position - Orthostatic VS BP Location FiO2 (%)   11/22/19 1012 11/22/19 1012 11/22/19 1015 11/22/19 1015 --   Tympanic Monitor Lying Left arm       Pain Score       11/22/19 1012       5           Vitals:    11/22/19 1012 11/22/19 1015 11/22/19 1109   BP:  114/56 108/63   Pulse: 63  65   Patient Position - Orthostatic VS:  Lying Lying         Visual Acuity      ED Medications  Medications   sodium chloride 0 9 % bolus 1,000 mL (0 mL Intravenous Stopped 11/22/19 1110)       Diagnostic Studies  Results Reviewed     Procedure Component Value Units Date/Time    TSH, 3rd generation with Free T4 reflex [[id number]]  (Abnormal) Collected:  11/22/19 1025    Lab Status:  Final result Specimen:  Blood from Arm, Right Updated:  11/22/19 1100     TSH 3RD GENERATON 9 590 uIU/mL     Narrative:       Patients undergoing fluorescein dye angiography may retain small amounts of fluorescein in the body for 48-72 hours post procedure  Samples containing fluorescein can produce falsely depressed TSH values  If the patient had this procedure,a specimen should be resubmitted post fluorescein clearance        Magnesium [[id number]]  (Normal) Collected:  11/22/19 1025    Lab Status:  Final result Specimen:  Blood from Arm, Right Updated:  11/22/19 1100     Magnesium 1 8 mg/dL     T4, free [[id number]] Collected:  11/22/19 1025    Lab Status: In process Specimen:  Blood from Arm, Right Updated:  11/22/19 1100    CBC and differential [[id number]]  (Abnormal) Collected:  11/22/19 1025    Lab Status:  Final result Specimen:  Blood from Arm, Right Updated:  11/22/19 1057     WBC 17 35 Thousand/uL      RBC 3 34 Million/uL      Hemoglobin 10 1 g/dL      Hematocrit 30 7 %      MCV 92 fL      MCH 30 2 pg      MCHC 32 9 g/dL      RDW 13 7 %      MPV 10 0 fL      Platelets 192 Thousands/uL      nRBC 0 /100 WBCs     Narrative: This is an appended report  These results have been appended to a previously verified report      Comprehensive metabolic panel [[id number]]  (Abnormal) Collected:  11/22/19 1025    Lab Status:  Final result Specimen:  Blood from Arm, Right Updated:  11/22/19 1052     Sodium 137 mmol/L      Potassium 3 6 mmol/L      Chloride 104 mmol/L      CO2 24 mmol/L      ANION GAP 9 mmol/L      BUN 3 mg/dL      Creatinine 0 44 mg/dL      Glucose 87 mg/dL      Calcium 8 0 mg/dL      AST 12 U/L      ALT 11 U/L      Alkaline Phosphatase 84 U/L      Total Protein 6 7 g/dL      Albumin 2 8 g/dL      Total Bilirubin 0 20 mg/dL      eGFR 132 ml/min/1 73sq m     Narrative:       [place] guidelines for Chronic Kidney Disease (CKD):     Stage 1 with normal or high GFR (GFR > 90 mL/min/1 73 square meters)    Stage 2 Mild CKD (GFR = 60-89 mL/min/1 73 square meters)    Stage 3A Moderate CKD (GFR = 45-59 mL/min/1 73 square meters)    Stage 3B Moderate CKD (GFR = 30-44 mL/min/1 73 square meters)    Stage 4 Severe CKD (GFR = 15-29 mL/min/1 73 square meters)    Stage 5 End Stage CKD (GFR <15 mL/min/1 73 square meters)  Note: GFR calculation is accurate only with a steady state creatinine    Protime-INR [[id number]]  (Abnormal) Collected:  11/22/19 1025    Lab Status:  Final result Specimen: Blood from Arm, Right Updated:  11/22/19 1049     Protime 9 7 seconds      INR 0 90    APTT [[id number]]  (Normal) Collected:  11/22/19 1025    Lab Status:  Final result Specimen:  Blood from Arm, Right Updated:  11/22/19 1049     PTT 26 seconds     UA w Reflex to Microscopic w Reflex to Culture [[id number]] Collected:  11/22/19 1025    Lab Status:  Final result Specimen:  Urine, Clean Catch Updated:  11/22/19 1036     Color, UA Yellow     Clarity, UA Cloudy     Specific [place], UA 1 010     pH, UA 7 0     Leukocytes, UA Negative     Nitrite, UA Negative     Protein, UA Negative mg/dl      Glucose, UA Negative mg/dl      Ketones, UA Negative mg/dl      Urobilinogen, UA 0 2 E U /dl      Bilirubin, UA Negative     Blood, UA Negative     URINE COMMENT --    Urine culture [[id number]] Collected:  11/22/19 1025    Lab Status: In process Specimen:  Urine, Clean Catch Updated:  11/22/19 1036                 No orders to display              Procedures  Procedures       ED Course  ED Course as of Nov 22 1128 Fri Nov 22, 2019   1020       1040 Discussed case with Dr Akanksha Yadav who accepts patient for transfer      [phone] Patient would like to be driven to [facility] by her  who needs to bring their children with them as they only have one car  Patient stable  Discussed with OB who states this is okay as patient is stable and reliable                                    MDM  Number of Diagnoses or Management Options  Abdominal pain in pregnancy:   Diagnosis management comments: Patient well appearing, stable, , baby moving, no LOF/vaginal bleeding, cervical os closed on bimanual exam  Discussed with Dr Akanksha Yadav who accepts patient for transfer, patient requested to have her  take her to [address]   Discussed risks of transportation on her own including but not limited to delay in care, worsening pain, unexpected delivery of baby, contractions, even possible death however she accepts risks and will go immediately to other campus for L&D triage  Patient stable at time of discharge for transfer  Gave patient proper education regarding diagnosis  Answered all questions  Return to ED for any worsening of symptoms otherwise follow up with primary care physician for re-evaluation  Discussed plan with patient who verbalized understanding and agreed to plan         Amount and/or Complexity of Data Reviewed  Clinical lab tests: ordered and reviewed  Tests in the medicine section of CPT®: reviewed and ordered  Discussion of test results with the performing providers: yes  Review and summarize past medical records: yes  Discuss the patient with other providers: yes (Discussed case with Dr Reese Jenkins)  Independent visualization of images, tracings, or specimens: yes        Disposition  Final diagnoses:   Abdominal pain in pregnancy     Time reflects when diagnosis was documented in both MDM as applicable and the Disposition within this note     Time User Action Codes Description Comment    11/22/2019 10:48 AM Keegan Evans Add [O26 [id number],  R10 9] Abdominal pain in pregnancy       ED Disposition     ED Disposition Condition Date/Time Comment    Transfer to Another Facility-In Network  Fri Nov 22, 2019 10:48 AM Hollie Mora should be transferred out to [facility] Ramone GARCIA Documentation      Most Recent Value   Patient Condition  The patient has been stabilized such that within reasonable medical probability, no material deterioration of the patient condition or the condition of the unborn child(yosvany) is likely to result from the transfer   Reason for Transfer  Level of Care needed not available at this facility   Benefits of Transfer  Specialized equipment and/or services available at the receiving facility (Include comment)________________________   Risks of Transfer  Potential for delay in receiving treatment   Accepting Physician  Dr Lexis Corona Name, [address] [facility] Sending MD Dr Malena Strauss   Provider Certification  General risk, such as traffic hazards, adverse weather conditions, rough terrain or turbulence, possible failure of equipment (including vehicle or aircraft), or consequences of actions of persons outside the control of the transport personnel      RN Documentation      Most [address] Name, [address]      Follow-up Information    None         Discharge Medication List as of 11/22/2019 11:24 AM      CONTINUE these medications which have NOT CHANGED    Details   Prenatal Vit-Iron Carbonyl-FA (PRENATAL MULTIVITAMIN) TABS Take 1 tablet by mouth daily, Historical Med      SYNTHROID 200 MCG tablet Take 200 mcg by mouth daily, Starting Fri 7/5/2019, Historical Med           No discharge procedures on file      ED Provider  Electronically Signed by           Mic Austin PA-C  11/22/19 8386

## 2019-11-23 LAB — BACTERIA UR CULT: NORMAL

## 2019-12-04 ENCOUNTER — ROUTINE PRENATAL (OUTPATIENT)
Dept: OBGYN CLINIC | Facility: CLINIC | Age: 34
End: 2019-12-04

## 2019-12-04 VITALS — WEIGHT: 211 LBS | SYSTOLIC BLOOD PRESSURE: 108 MMHG | BODY MASS INDEX: 34.06 KG/M2 | DIASTOLIC BLOOD PRESSURE: 68 MMHG

## 2019-12-04 DIAGNOSIS — O28.3 INCREASED NUCHAL TRANSLUCENCY SPACE ON FETAL ULTRASOUND: ICD-10-CM

## 2019-12-04 DIAGNOSIS — Z34.83 PRENATAL CARE, SUBSEQUENT PREGNANCY, THIRD TRIMESTER: Primary | ICD-10-CM

## 2019-12-04 PROCEDURE — PNV: Performed by: NURSE PRACTITIONER

## 2019-12-04 NOTE — PROGRESS NOTES
OFFICE VISIT: Denies any N/V, HA, Cramping, VB, LOF, Edema, Domestic Violence, Smoking  + FM  Tolerating PNV  Pt states very uncomfortable  Back hurts, legs hurt, she can barely get off her couch  Went for massage earlier this week  Offered PT  Pt will try chiropractor  Tried pelvic support belt with no help  Follows with endocrine for thyroid, has rx to repeat TSH  Reprinted script for GTT  Advised to have done ASAP  Pt complaining of baby being big  Reviewed importance of GTT as gestational diabetes can affect growth  Pt aware, will have done  Would like a repeat , will look into scheduling after 39 weeks gestation  Has follow up growth scan with [facility] scheduled  Declines Flu and TDap  RTO 2 weeks or sooner as needed

## 2019-12-05 LAB — GLUCOSE 1H P 50 G GLC PO SERPL-MCNC: 103 MG/DL (ref 65–139)

## 2019-12-23 ENCOUNTER — ULTRASOUND (OUTPATIENT)
Dept: PERINATAL CARE | Facility: CLINIC | Age: 34
End: 2019-12-23
Payer: COMMERCIAL

## 2019-12-23 ENCOUNTER — ROUTINE PRENATAL (OUTPATIENT)
Dept: OBGYN CLINIC | Facility: CLINIC | Age: 34
End: 2019-12-23

## 2019-12-23 VITALS
WEIGHT: 216.8 LBS | HEIGHT: 66 IN | DIASTOLIC BLOOD PRESSURE: 78 MMHG | HEART RATE: 93 BPM | SYSTOLIC BLOOD PRESSURE: 115 MMHG | BODY MASS INDEX: 34.84 KG/M2

## 2019-12-23 VITALS
BODY MASS INDEX: 35.03 KG/M2 | HEIGHT: 66 IN | SYSTOLIC BLOOD PRESSURE: 118 MMHG | DIASTOLIC BLOOD PRESSURE: 82 MMHG | WEIGHT: 218 LBS

## 2019-12-23 DIAGNOSIS — Z3A.34 PREGNANCY WITH 34 COMPLETED WEEKS GESTATION: Primary | ICD-10-CM

## 2019-12-23 DIAGNOSIS — E03.9 HYPOTHYROIDISM IN PREGNANCY, THIRD TRIMESTER: ICD-10-CM

## 2019-12-23 DIAGNOSIS — O40.3XX0 POLYHYDRAMNIOS IN SINGLETON PREGNANCY IN THIRD TRIMESTER: Primary | ICD-10-CM

## 2019-12-23 DIAGNOSIS — O99.283 HYPOTHYROIDISM IN PREGNANCY, THIRD TRIMESTER: ICD-10-CM

## 2019-12-23 DIAGNOSIS — Z3A.34 34 WEEKS GESTATION OF PREGNANCY: ICD-10-CM

## 2019-12-23 PROCEDURE — 99212 OFFICE O/P EST SF 10 MIN: CPT | Performed by: OBSTETRICS & GYNECOLOGY

## 2019-12-23 PROCEDURE — 76816 OB US FOLLOW-UP PER FETUS: CPT | Performed by: OBSTETRICS & GYNECOLOGY

## 2019-12-23 PROCEDURE — PNV: Performed by: OBSTETRICS & GYNECOLOGY

## 2019-12-23 NOTE — PATIENT INSTRUCTIONS
Kick Counts in Pregnancy   WHAT YOU NEED TO KNOW:   Kick counts measure how much your baby is moving in your womb  A kick from your baby can be felt as a twist, turn, swish, roll, or jab  It is common to feel your baby kicking at 26 to 28 weeks of pregnancy  You may feel your baby kick as early as 20 weeks of pregnancy  DISCHARGE INSTRUCTIONS:   Return to the emergency department if:   · You feel your baby kick less as the day goes on      · You do not feel any kicks in a day  Contact your healthcare provider if:   · You feel a change in the number of kicks or movements of your baby  · You feel fewer than 10 kicks within 2 hours after counting twice  · You have questions or concerns about your baby's movements  Why measure kick counts:  Your baby's movement may provide information about your baby's health  He may move less, or not at all, if there are problems  He may move less if he does not have enough room to grow in your uterus (womb)  He may also move less if he is not getting enough oxygen or nutrition from the placenta  Tell your healthcare provider as soon as you feel a change in your baby's movements  Problems that are found earlier are easier to treat  When to measure kick counts:   · Measure kick counts at the same time every day  · Measure kick counts when your baby is awake and most active  Your baby may be most active in the evening  · Measure kick counts after a meal or snack  Your baby may be more active after you eat  Wait 2 hours after you drink liquids that contain caffeine  Caffeine can make your baby more active than usual     · You should not smoke while you are pregnant  Smoking increases the risk of health problems for you and for your baby during your pregnancy  If you do smoke, wait 2 hours to measure kick counts  Nicotine can make your baby more active than usual   How to measure kick counts:  Check that your baby is awake before you measure kick counts   You can wake up your baby by lightly pushing on your belly, walking, or drinking something cold  Your healthcare provider may tell you different ways to measure kick counts  He may tell you to do the following:  · Use a chart or clock to keep track of the time you start and finish counting  · Sit in a chair or lie on your left side  · Place your hands on the largest part of your belly  · Count until you reach 10 kicks  Write down how much time it takes to count 10 kicks  · It may take 30 minutes to 2 hours to count 10 kicks  It should not take more than 2 hours to count 10 kicks  · If you do not feel 10 kicks within 2 hours, wait 1 hour and count again  Your baby can sleep for up to 40 minutes at one time  Follow up with your healthcare provider as directed:  Write down your questions so you remember to ask them during your visits  © 2017 [address] [employer] [employer] Information is for End User's use only and may not be sold, redistributed or otherwise used for commercial purposes  All illustrations and images included in CareNotes® are the copyrighted property of [employer]  or Pavel Cervantes  The above information is an  only  It is not intended as medical advice for individual conditions or treatments  Talk to your doctor, nurse or pharmacist before following any medical regimen to see if it is safe and effective for you

## 2019-12-23 NOTE — PROGRESS NOTES
Please refer to the [facility] ultrasound report in Ob Procedures for additional information regarding the visit to the [facility]  today

## 2019-12-23 NOTE — PROGRESS NOTES
Patient reports good fm, no n/v, bleeding, dom violence, or smoking    yazmin pnv patient with vaginal wetness, speculum exam wet discharge, nitrazine neg, cervix visually closed, urine neg neg, patient very uncomfortable had pnc u/s today poly hydramnios and lga, reviewed symptoms to watch for, return in 1 week for gbs

## 2019-12-23 NOTE — LETTER
December 23, 2019     Briana Anguiano MD  [address]    Patient: Garrick Kennedy   YOB: 1985   Date of Visit: 12/23/2019       Dear Dr Lavon Johnston: Thank you for referring Sondra Guerrero to me for evaluation  Below are my notes for this consultation  If you have questions, please do not hesitate to call me  I look forward to following your patient along with you  Sincerely,        Mariely Kumar MD        CC: No Recipients  Mariely Kumar MD  12/23/2019  1:14 PM  Sign at close encounter  Please refer to the [facility] ultrasound report in Ob Procedures for additional information regarding the visit to the [facility]  today

## 2020-01-03 ENCOUNTER — ROUTINE PRENATAL (OUTPATIENT)
Dept: OBGYN CLINIC | Facility: CLINIC | Age: 35
End: 2020-01-03

## 2020-01-03 ENCOUNTER — ULTRASOUND (OUTPATIENT)
Dept: PERINATAL CARE | Facility: OTHER | Age: 35
End: 2020-01-03
Payer: COMMERCIAL

## 2020-01-03 VITALS — BODY MASS INDEX: 35.57 KG/M2 | WEIGHT: 220.4 LBS | SYSTOLIC BLOOD PRESSURE: 102 MMHG | DIASTOLIC BLOOD PRESSURE: 68 MMHG

## 2020-01-03 VITALS
SYSTOLIC BLOOD PRESSURE: 127 MMHG | DIASTOLIC BLOOD PRESSURE: 81 MMHG | HEIGHT: 66 IN | HEART RATE: 105 BPM | WEIGHT: 221 LBS | BODY MASS INDEX: 35.52 KG/M2

## 2020-01-03 DIAGNOSIS — O40.3XX0 POLYHYDRAMNIOS AFFECTING PREGNANCY IN THIRD TRIMESTER: ICD-10-CM

## 2020-01-03 DIAGNOSIS — Z3A.36 36 WEEKS GESTATION OF PREGNANCY: Primary | ICD-10-CM

## 2020-01-03 DIAGNOSIS — O28.3 INCREASED NUCHAL TRANSLUCENCY SPACE ON FETAL ULTRASOUND: ICD-10-CM

## 2020-01-03 DIAGNOSIS — Z34.83 PRENATAL CARE, SUBSEQUENT PREGNANCY, THIRD TRIMESTER: ICD-10-CM

## 2020-01-03 DIAGNOSIS — O36.60X0 EXCESSIVE FETAL GROWTH AFFECTING MANAGEMENT OF PREGNANCY, ANTEPARTUM, SINGLE OR UNSPECIFIED FETUS: ICD-10-CM

## 2020-01-03 DIAGNOSIS — Z34.83 PRENATAL CARE, SUBSEQUENT PREGNANCY, THIRD TRIMESTER: Primary | ICD-10-CM

## 2020-01-03 PROCEDURE — 76816 OB US FOLLOW-UP PER FETUS: CPT | Performed by: OBSTETRICS & GYNECOLOGY

## 2020-01-03 PROCEDURE — PNV: Performed by: NURSE PRACTITIONER

## 2020-01-03 RX ORDER — ACETAMINOPHEN 500 MG
500 TABLET ORAL EVERY 6 HOURS PRN
COMMUNITY
End: 2020-01-17 | Stop reason: HOSPADM

## 2020-01-03 NOTE — PROGRESS NOTES
OFFICE VISIT: Pt extremely uncomfortable  States she can no longer put on her socks or shoes, cannot get off bed or couch  Lays on bed all day because it is so uncomfortable to walk, she can no longer take her children to school do to discomfort  Has known polyhydramnios and possible macrosomia  Rx for referral to [facility] for evaluation given  Planned repeat  on 19  Mild cramping and edema  Denies any N/V, HA,  VB, LOF, Domestic Violence, Smoking  + FM  Tolerating PNV  GBS done  RTO 1 week for labor talk or sooner as needed

## 2020-01-03 NOTE — LETTER
January 3, 2020     Mohindermohamudrolanda TerrazasJuan Alirio    Patient: Jaz Guerin   YOB: 1985   Date of Visit: 1/3/2020       Dear Dr Javy Bradley: Thank you for referring Quan Lezama to me for evaluation  Below are my notes for this consultation  If you have questions, please do not hesitate to call me  I look forward to following your patient along with you  Sincerely,        Samson Mahmood MD        CC: No Recipients  Samson Mahmood MD  1/3/2020  3:05 PM  Sign at close encounter  Please refer to the [facility] ultrasound report in Ob Procedures for additional information regarding the visit to the [facility]  today

## 2020-01-03 NOTE — PATIENT INSTRUCTIONS
Kick Counts in Pregnancy   WHAT YOU NEED TO KNOW:   Kick counts measure how much your baby is moving in your womb  A kick from your baby can be felt as a twist, turn, swish, roll, or jab  It is common to feel your baby kicking at 26 to 28 weeks of pregnancy  You may feel your baby kick as early as 20 weeks of pregnancy  DISCHARGE INSTRUCTIONS:   Return to the emergency department if:   · You feel your baby kick less as the day goes on      · You do not feel any kicks in a day  Contact your healthcare provider if:   · You feel a change in the number of kicks or movements of your baby  · You feel fewer than 10 kicks within 2 hours after counting twice  · You have questions or concerns about your baby's movements  Why measure kick counts:  Your baby's movement may provide information about your baby's health  He may move less, or not at all, if there are problems  He may move less if he does not have enough room to grow in your uterus (womb)  He may also move less if he is not getting enough oxygen or nutrition from the placenta  Tell your healthcare provider as soon as you feel a change in your baby's movements  Problems that are found earlier are easier to treat  When to measure kick counts:   · Measure kick counts at the same time every day  · Measure kick counts when your baby is awake and most active  Your baby may be most active in the evening  · Measure kick counts after a meal or snack  Your baby may be more active after you eat  Wait 2 hours after you drink liquids that contain caffeine  Caffeine can make your baby more active than usual     · You should not smoke while you are pregnant  Smoking increases the risk of health problems for you and for your baby during your pregnancy  If you do smoke, wait 2 hours to measure kick counts  Nicotine can make your baby more active than usual   How to measure kick counts:  Check that your baby is awake before you measure kick counts   You can wake up your baby by lightly pushing on your belly, walking, or drinking something cold  Your healthcare provider may tell you different ways to measure kick counts  He may tell you to do the following:  · Use a chart or clock to keep track of the time you start and finish counting  · Sit in a chair or lie on your left side  · Place your hands on the largest part of your belly  · Count until you reach 10 kicks  Write down how much time it takes to count 10 kicks  · It may take 30 minutes to 2 hours to count 10 kicks  It should not take more than 2 hours to count 10 kicks  · If you do not feel 10 kicks within 2 hours, wait 1 hour and count again  Your baby can sleep for up to 40 minutes at one time  Follow up with your healthcare provider as directed:  Write down your questions so you remember to ask them during your visits  © 2017 [address] [employer] Information is for End User's use only and may not be sold, redistributed or otherwise used for commercial purposes  All illustrations and images included in CareNotes® are the copyrighted property of [employer]  or Pavel Cervantes  The above information is an  only  It is not intended as medical advice for individual conditions or treatments  Talk to your doctor, nurse or pharmacist before following any medical regimen to see if it is safe and effective for you

## 2020-01-03 NOTE — PROGRESS NOTES
Please refer to the [facility] ultrasound report in Ob Procedures for additional information regarding the visit to the [facility]  today

## 2020-01-05 LAB — GP B STREP DNA SPEC QL NAA+PROBE: NEGATIVE

## 2020-01-09 ENCOUNTER — ROUTINE PRENATAL (OUTPATIENT)
Dept: OBGYN CLINIC | Facility: CLINIC | Age: 35
End: 2020-01-09
Payer: COMMERCIAL

## 2020-01-09 VITALS — WEIGHT: 221.2 LBS | DIASTOLIC BLOOD PRESSURE: 62 MMHG | SYSTOLIC BLOOD PRESSURE: 108 MMHG | BODY MASS INDEX: 35.7 KG/M2

## 2020-01-09 DIAGNOSIS — O28.3 INCREASED NUCHAL TRANSLUCENCY SPACE ON FETAL ULTRASOUND: ICD-10-CM

## 2020-01-09 PROCEDURE — 59426 ANTEPARTUM CARE ONLY: CPT | Performed by: NURSE PRACTITIONER

## 2020-01-09 NOTE — PROGRESS NOTES
OFFICE VISIT: Denies any N/V, HA, Cramping, VB, LOF, Edema, Domestic Violence, Smoking  + FM  Tolerating PNV  Pt very uncomfortable due to baby and polyhydramnios  Recently seen at [facility] was told baby is 8lb 10oz and CHARLOTTE was 26 9  Baby was breech at appointment  Has  scheduled  Urine neg/neg  Labor talk done  Plans on breast feeding  Would like delayed cord clamping  GBS negative  RTO 1 week or sooner as needed

## 2020-01-15 ENCOUNTER — ANESTHESIA EVENT (INPATIENT)
Dept: LABOR AND DELIVERY | Facility: HOSPITAL | Age: 35
DRG: 540 | End: 2020-01-15
Payer: COMMERCIAL

## 2020-01-15 ENCOUNTER — HOSPITAL ENCOUNTER (INPATIENT)
Facility: HOSPITAL | Age: 35
LOS: 2 days | Discharge: HOME | DRG: 540 | End: 2020-01-17
Attending: OBSTETRICS & GYNECOLOGY | Admitting: OBSTETRICS & GYNECOLOGY
Payer: COMMERCIAL

## 2020-01-15 ENCOUNTER — ULTRASOUND (OUTPATIENT)
Dept: PERINATAL CARE | Facility: CLINIC | Age: 35
DRG: 540 | End: 2020-01-15
Payer: COMMERCIAL

## 2020-01-15 VITALS
SYSTOLIC BLOOD PRESSURE: 107 MMHG | WEIGHT: 221.4 LBS | HEART RATE: 96 BPM | DIASTOLIC BLOOD PRESSURE: 73 MMHG | BODY MASS INDEX: 35.58 KG/M2 | HEIGHT: 66 IN

## 2020-01-15 DIAGNOSIS — Z98.891 STATUS POST REPEAT LOW TRANSVERSE CESAREAN SECTION: ICD-10-CM

## 2020-01-15 DIAGNOSIS — O34.219 PREVIOUS CESAREAN SECTION COMPLICATING PREGNANCY: ICD-10-CM

## 2020-01-15 DIAGNOSIS — O34.219 HISTORY OF CESAREAN DELIVERY, ANTEPARTUM: ICD-10-CM

## 2020-01-15 DIAGNOSIS — O40.3XX0 POLYHYDRAMNIOS IN THIRD TRIMESTER COMPLICATION, SINGLE OR UNSPECIFIED FETUS: ICD-10-CM

## 2020-01-15 DIAGNOSIS — Z3A.38 PREGNANCY WITH 38 COMPLETED WEEKS GESTATION: Primary | ICD-10-CM

## 2020-01-15 DIAGNOSIS — Z3A.38 38 WEEKS GESTATION OF PREGNANCY: ICD-10-CM

## 2020-01-15 DIAGNOSIS — O36.63X0 MACROSOMIA OF FETUS AFFECTING MANAGEMENT OF MOTHER IN THIRD TRIMESTER, SINGLE OR UNSPECIFIED FETUS: Primary | ICD-10-CM

## 2020-01-15 DIAGNOSIS — Z36.89 ENCOUNTER FOR ULTRASOUND TO CHECK FETAL GROWTH: ICD-10-CM

## 2020-01-15 LAB
ABO GROUP BLD: NORMAL
BASE EXCESS BLDCOA CALC-SCNC: 0.6 MMOL/L (ref 3–11)
BASE EXCESS BLDCOV CALC-SCNC: -3.9 MMOL/L (ref 1–9)
BASOPHILS # BLD AUTO: 0.09 THOUSANDS/ΜL (ref 0–0.1)
BASOPHILS NFR BLD AUTO: 1 % (ref 0–1)
BLD GP AB SCN SERPL QL: NEGATIVE
EOSINOPHIL # BLD AUTO: 0.37 THOUSAND/ΜL (ref 0–0.61)
EOSINOPHIL NFR BLD AUTO: 3 % (ref 0–6)
ERYTHROCYTE [DISTWIDTH] IN BLOOD BY AUTOMATED COUNT: 16 % (ref 11.6–15.1)
HCO3 BLDCOA-SCNC: 29.1 MMOL/L (ref 17.3–27.3)
HCO3 BLDCOV-SCNC: 21.9 MMOL/L (ref 12.2–28.6)
HCT VFR BLD AUTO: 28.6 % (ref 34.8–46.1)
HGB BLD-MCNC: 9.2 G/DL (ref 11.5–15.4)
IMM GRANULOCYTES # BLD AUTO: 0.29 THOUSAND/UL (ref 0–0.2)
IMM GRANULOCYTES NFR BLD AUTO: 2 % (ref 0–2)
LYMPHOCYTES # BLD AUTO: 2.19 THOUSANDS/ΜL (ref 0.6–4.47)
LYMPHOCYTES NFR BLD AUTO: 15 % (ref 14–44)
MCH RBC QN AUTO: 27.6 PG (ref 26.8–34.3)
MCHC RBC AUTO-ENTMCNC: 32.2 G/DL (ref 31.4–37.4)
MCV RBC AUTO: 86 FL (ref 82–98)
MONOCYTES # BLD AUTO: 1.08 THOUSAND/ΜL (ref 0.17–1.22)
MONOCYTES NFR BLD AUTO: 7 % (ref 4–12)
NEUTROPHILS # BLD AUTO: 10.88 THOUSANDS/ΜL (ref 1.85–7.62)
NEUTS SEG NFR BLD AUTO: 72 % (ref 43–75)
NRBC BLD AUTO-RTO: 0 /100 WBCS
O2 CT VFR BLDCOA CALC: 3.9 ML/DL
OXYHGB MFR BLDCOA: 19 %
OXYHGB MFR BLDCOV: 57.8 %
PCO2 BLDCOA: 64.2 MM[HG] (ref 30–60)
PCO2 BLDCOV: 42.4 MM HG (ref 27–43)
PH BLDCOA: 7.27 [PH] (ref 7.23–7.43)
PH BLDCOV: 7.33 [PH] (ref 7.19–7.49)
PLATELET # BLD AUTO: 240 THOUSANDS/UL (ref 149–390)
PMV BLD AUTO: 9.9 FL (ref 8.9–12.7)
PO2 BLDCOA: 13 MM HG (ref 5–25)
PO2 BLDCOV: 26.5 MM HG (ref 15–45)
RBC # BLD AUTO: 3.33 MILLION/UL (ref 3.81–5.12)
RH BLD: POSITIVE
SAO2 % BLDCOV: 12.1 ML/DL
SPECIMEN EXPIRATION DATE: NORMAL
WBC # BLD AUTO: 14.9 THOUSAND/UL (ref 4.31–10.16)

## 2020-01-15 PROCEDURE — 76816 OB US FOLLOW-UP PER FETUS: CPT | Performed by: OBSTETRICS & GYNECOLOGY

## 2020-01-15 PROCEDURE — 86901 BLOOD TYPING SEROLOGIC RH(D): CPT | Performed by: OBSTETRICS & GYNECOLOGY

## 2020-01-15 PROCEDURE — 86592 SYPHILIS TEST NON-TREP QUAL: CPT | Performed by: OBSTETRICS & GYNECOLOGY

## 2020-01-15 PROCEDURE — 86850 RBC ANTIBODY SCREEN: CPT | Performed by: OBSTETRICS & GYNECOLOGY

## 2020-01-15 PROCEDURE — 85025 COMPLETE CBC W/AUTO DIFF WBC: CPT | Performed by: OBSTETRICS & GYNECOLOGY

## 2020-01-15 PROCEDURE — 59514 CESAREAN DELIVERY ONLY: CPT | Performed by: OBSTETRICS & GYNECOLOGY

## 2020-01-15 PROCEDURE — 82805 BLOOD GASES W/O2 SATURATION: CPT | Performed by: OBSTETRICS & GYNECOLOGY

## 2020-01-15 PROCEDURE — 86900 BLOOD TYPING SEROLOGIC ABO: CPT | Performed by: OBSTETRICS & GYNECOLOGY

## 2020-01-15 PROCEDURE — 99212 OFFICE O/P EST SF 10 MIN: CPT | Performed by: OBSTETRICS & GYNECOLOGY

## 2020-01-15 PROCEDURE — 99024 POSTOP FOLLOW-UP VISIT: CPT | Performed by: OBSTETRICS & GYNECOLOGY

## 2020-01-15 PROCEDURE — 86923 COMPATIBILITY TEST ELECTRIC: CPT

## 2020-01-15 RX ORDER — HYDROMORPHONE HCL/PF 1 MG/ML
0.2 SYRINGE (ML) INJECTION
Status: DISCONTINUED | OUTPATIENT
Start: 2020-01-15 | End: 2020-01-15

## 2020-01-15 RX ORDER — ACETAMINOPHEN 325 MG/1
975 TABLET ORAL EVERY 8 HOURS SCHEDULED
Status: DISCONTINUED | OUTPATIENT
Start: 2020-01-15 | End: 2020-01-17 | Stop reason: HOSPADM

## 2020-01-15 RX ORDER — CALCIUM CARBONATE 200(500)MG
1000 TABLET,CHEWABLE ORAL DAILY PRN
Status: DISCONTINUED | OUTPATIENT
Start: 2020-01-15 | End: 2020-01-17 | Stop reason: HOSPADM

## 2020-01-15 RX ORDER — BUPIVACAINE HYDROCHLORIDE AND EPINEPHRINE 2.5; 5 MG/ML; UG/ML
INJECTION, SOLUTION EPIDURAL; INFILTRATION; INTRACAUDAL; PERINEURAL AS NEEDED
Status: DISCONTINUED | OUTPATIENT
Start: 2020-01-15 | End: 2020-01-15 | Stop reason: SURG

## 2020-01-15 RX ORDER — ONDANSETRON 2 MG/ML
INJECTION INTRAMUSCULAR; INTRAVENOUS AS NEEDED
Status: DISCONTINUED | OUTPATIENT
Start: 2020-01-15 | End: 2020-01-15

## 2020-01-15 RX ORDER — SODIUM CHLORIDE, SODIUM LACTATE, POTASSIUM CHLORIDE, CALCIUM CHLORIDE 600; 310; 30; 20 MG/100ML; MG/100ML; MG/100ML; MG/100ML
125 INJECTION, SOLUTION INTRAVENOUS CONTINUOUS
Status: DISCONTINUED | OUTPATIENT
Start: 2020-01-15 | End: 2020-01-17 | Stop reason: HOSPADM

## 2020-01-15 RX ORDER — DIAPER,BRIEF,INFANT-TODD,DISP
1 EACH MISCELLANEOUS DAILY PRN
Status: DISCONTINUED | OUTPATIENT
Start: 2020-01-15 | End: 2020-01-17 | Stop reason: HOSPADM

## 2020-01-15 RX ORDER — OXYCODONE HYDROCHLORIDE AND ACETAMINOPHEN 5; 325 MG/1; MG/1
2 TABLET ORAL EVERY 4 HOURS PRN
Status: DISCONTINUED | OUTPATIENT
Start: 2020-01-16 | End: 2020-01-16

## 2020-01-15 RX ORDER — CEFAZOLIN SODIUM 2 G/50ML
2000 SOLUTION INTRAVENOUS ONCE
Status: DISCONTINUED | OUTPATIENT
Start: 2020-01-15 | End: 2020-01-15

## 2020-01-15 RX ORDER — DIPHENHYDRAMINE HCL 25 MG
25 TABLET ORAL EVERY 6 HOURS PRN
Status: DISCONTINUED | OUTPATIENT
Start: 2020-01-15 | End: 2020-01-17 | Stop reason: HOSPADM

## 2020-01-15 RX ORDER — ONDANSETRON 2 MG/ML
4 INJECTION INTRAMUSCULAR; INTRAVENOUS EVERY 8 HOURS PRN
Status: DISCONTINUED | OUTPATIENT
Start: 2020-01-15 | End: 2020-01-15

## 2020-01-15 RX ORDER — OXYTOCIN/RINGER'S LACTATE 30/500 ML
62.5 PLASTIC BAG, INJECTION (ML) INTRAVENOUS
Status: DISPENSED | OUTPATIENT
Start: 2020-01-15 | End: 2020-01-16

## 2020-01-15 RX ORDER — CEFAZOLIN SODIUM 2 G/50ML
SOLUTION INTRAVENOUS AS NEEDED
Status: DISCONTINUED | OUTPATIENT
Start: 2020-01-15 | End: 2020-01-15 | Stop reason: SURG

## 2020-01-15 RX ORDER — PROPOFOL 10 MG/ML
INJECTION, EMULSION INTRAVENOUS AS NEEDED
Status: DISCONTINUED | OUTPATIENT
Start: 2020-01-15 | End: 2020-01-15 | Stop reason: SURG

## 2020-01-15 RX ORDER — ONDANSETRON 2 MG/ML
4 INJECTION INTRAMUSCULAR; INTRAVENOUS EVERY 8 HOURS PRN
Status: DISCONTINUED | OUTPATIENT
Start: 2020-01-15 | End: 2020-01-17 | Stop reason: HOSPADM

## 2020-01-15 RX ORDER — OXYCODONE HYDROCHLORIDE AND ACETAMINOPHEN 5; 325 MG/1; MG/1
1 TABLET ORAL EVERY 4 HOURS PRN
Status: DISCONTINUED | OUTPATIENT
Start: 2020-01-16 | End: 2020-01-16

## 2020-01-15 RX ORDER — TRANEXAMIC ACID 100 MG/ML
INJECTION, SOLUTION INTRAVENOUS AS NEEDED
Status: DISCONTINUED | OUTPATIENT
Start: 2020-01-15 | End: 2020-01-15 | Stop reason: SURG

## 2020-01-15 RX ORDER — HYDROMORPHONE HCL/PF 1 MG/ML
0.4 SYRINGE (ML) INJECTION ONCE
Status: COMPLETED | OUTPATIENT
Start: 2020-01-15 | End: 2020-01-15

## 2020-01-15 RX ORDER — ACETAMINOPHEN 325 MG/1
650 TABLET ORAL EVERY 4 HOURS PRN
Status: DISCONTINUED | OUTPATIENT
Start: 2020-01-15 | End: 2020-01-17 | Stop reason: HOSPADM

## 2020-01-15 RX ORDER — DOCUSATE SODIUM 100 MG/1
100 CAPSULE, LIQUID FILLED ORAL 2 TIMES DAILY
Status: DISCONTINUED | OUTPATIENT
Start: 2020-01-15 | End: 2020-01-17 | Stop reason: HOSPADM

## 2020-01-15 RX ORDER — OXYTOCIN/RINGER'S LACTATE 30/500 ML
PLASTIC BAG, INJECTION (ML) INTRAVENOUS CONTINUOUS PRN
Status: DISCONTINUED | OUTPATIENT
Start: 2020-01-15 | End: 2020-01-15 | Stop reason: SURG

## 2020-01-15 RX ORDER — MORPHINE SULFATE 0.5 MG/ML
INJECTION, SOLUTION EPIDURAL; INTRATHECAL; INTRAVENOUS AS NEEDED
Status: DISCONTINUED | OUTPATIENT
Start: 2020-01-15 | End: 2020-01-15 | Stop reason: SURG

## 2020-01-15 RX ORDER — ONDANSETRON 2 MG/ML
4 INJECTION INTRAMUSCULAR; INTRAVENOUS EVERY 8 HOURS PRN
Status: DISCONTINUED | OUTPATIENT
Start: 2020-01-15 | End: 2020-01-15 | Stop reason: SDUPTHER

## 2020-01-15 RX ORDER — ACETAMINOPHEN 325 MG/1
650 TABLET ORAL EVERY 6 HOURS PRN
Status: DISCONTINUED | OUTPATIENT
Start: 2020-01-15 | End: 2020-01-17 | Stop reason: HOSPADM

## 2020-01-15 RX ORDER — METOCLOPRAMIDE HYDROCHLORIDE 5 MG/ML
10 INJECTION INTRAMUSCULAR; INTRAVENOUS EVERY 6 HOURS PRN
Status: DISCONTINUED | OUTPATIENT
Start: 2020-01-15 | End: 2020-01-15

## 2020-01-15 RX ORDER — SIMETHICONE 80 MG
80 TABLET,CHEWABLE ORAL 4 TIMES DAILY PRN
Status: DISCONTINUED | OUTPATIENT
Start: 2020-01-15 | End: 2020-01-17 | Stop reason: HOSPADM

## 2020-01-15 RX ORDER — FENTANYL CITRATE/PF 50 MCG/ML
25 SYRINGE (ML) INJECTION
Status: COMPLETED | OUTPATIENT
Start: 2020-01-15 | End: 2020-01-15

## 2020-01-15 RX ORDER — OXYCODONE HYDROCHLORIDE AND ACETAMINOPHEN 5; 325 MG/1; MG/1
1 TABLET ORAL EVERY 4 HOURS PRN
Status: DISCONTINUED | OUTPATIENT
Start: 2020-01-15 | End: 2020-01-16

## 2020-01-15 RX ORDER — HYDROMORPHONE HCL/PF 1 MG/ML
0.4 SYRINGE (ML) INJECTION
Status: DISPENSED | OUTPATIENT
Start: 2020-01-15 | End: 2020-01-16

## 2020-01-15 RX ORDER — FENTANYL CITRATE 50 UG/ML
INJECTION, SOLUTION INTRAMUSCULAR; INTRAVENOUS AS NEEDED
Status: DISCONTINUED | OUTPATIENT
Start: 2020-01-15 | End: 2020-01-15 | Stop reason: SURG

## 2020-01-15 RX ORDER — SODIUM CHLORIDE, SODIUM LACTATE, POTASSIUM CHLORIDE, CALCIUM CHLORIDE 600; 310; 30; 20 MG/100ML; MG/100ML; MG/100ML; MG/100ML
125 INJECTION, SOLUTION INTRAVENOUS CONTINUOUS
Status: DISCONTINUED | OUTPATIENT
Start: 2020-01-15 | End: 2020-01-15

## 2020-01-15 RX ORDER — HYDROMORPHONE HCL/PF 1 MG/ML
1 SYRINGE (ML) INJECTION EVERY 2 HOUR PRN
Status: DISCONTINUED | OUTPATIENT
Start: 2020-01-16 | End: 2020-01-17 | Stop reason: HOSPADM

## 2020-01-15 RX ADMIN — FENTANYL CITRATE 50 MCG: 50 INJECTION, SOLUTION INTRAMUSCULAR; INTRAVENOUS at 15:06

## 2020-01-15 RX ADMIN — FENTANYL CITRATE 25 MCG: 50 INJECTION, SOLUTION INTRAMUSCULAR; INTRAVENOUS at 15:20

## 2020-01-15 RX ADMIN — CEFAZOLIN SODIUM 2000 MG: 2 SOLUTION INTRAVENOUS at 14:15

## 2020-01-15 RX ADMIN — HYDROMORPHONE HYDROCHLORIDE 0.4 MG: 1 INJECTION, SOLUTION INTRAMUSCULAR; INTRAVENOUS; SUBCUTANEOUS at 17:33

## 2020-01-15 RX ADMIN — SODIUM CHLORIDE, SODIUM LACTATE, POTASSIUM CHLORIDE, AND CALCIUM CHLORIDE 125 ML/HR: .6; .31; .03; .02 INJECTION, SOLUTION INTRAVENOUS at 12:50

## 2020-01-15 RX ADMIN — SODIUM CHLORIDE, SODIUM LACTATE, POTASSIUM CHLORIDE, AND CALCIUM CHLORIDE: .6; .31; .03; .02 INJECTION, SOLUTION INTRAVENOUS at 14:40

## 2020-01-15 RX ADMIN — FENTANYL CITRATE 25 MCG: 50 INJECTION, SOLUTION INTRAMUSCULAR; INTRAVENOUS at 16:32

## 2020-01-15 RX ADMIN — PROPOFOL 20 MG: 10 INJECTION, EMULSION INTRAVENOUS at 15:09

## 2020-01-15 RX ADMIN — Medication 62.5 MILLI-UNITS/MIN: at 16:54

## 2020-01-15 RX ADMIN — PROPOFOL 20 MG: 10 INJECTION, EMULSION INTRAVENOUS at 15:17

## 2020-01-15 RX ADMIN — ONDANSETRON 4 MG: 2 INJECTION INTRAMUSCULAR; INTRAVENOUS at 14:27

## 2020-01-15 RX ADMIN — MORPHINE SULFATE 0.1 MG: 0.5 INJECTION, SOLUTION EPIDURAL; INTRATHECAL; INTRAVENOUS at 14:15

## 2020-01-15 RX ADMIN — FENTANYL CITRATE 25 MCG: 50 INJECTION, SOLUTION INTRAMUSCULAR; INTRAVENOUS at 15:23

## 2020-01-15 RX ADMIN — SODIUM CHLORIDE, SODIUM LACTATE, POTASSIUM CHLORIDE, AND CALCIUM CHLORIDE 1000 ML: .6; .31; .03; .02 INJECTION, SOLUTION INTRAVENOUS at 12:10

## 2020-01-15 RX ADMIN — FENTANYL CITRATE 15 MCG: 50 INJECTION, SOLUTION INTRAMUSCULAR; INTRAVENOUS at 14:15

## 2020-01-15 RX ADMIN — FENTANYL CITRATE 25 MCG: 50 INJECTION, SOLUTION INTRAMUSCULAR; INTRAVENOUS at 16:51

## 2020-01-15 RX ADMIN — FENTANYL CITRATE 25 MCG: 50 INJECTION, SOLUTION INTRAMUSCULAR; INTRAVENOUS at 17:09

## 2020-01-15 RX ADMIN — SODIUM CHLORIDE, SODIUM LACTATE, POTASSIUM CHLORIDE, AND CALCIUM CHLORIDE 125 ML/HR: .6; .31; .03; .02 INJECTION, SOLUTION INTRAVENOUS at 18:38

## 2020-01-15 RX ADMIN — PROPOFOL 20 MG: 10 INJECTION, EMULSION INTRAVENOUS at 15:14

## 2020-01-15 RX ADMIN — METOCLOPRAMIDE 10 MG: 5 INJECTION, SOLUTION INTRAMUSCULAR; INTRAVENOUS at 14:27

## 2020-01-15 RX ADMIN — PHENYLEPHRINE HYDROCHLORIDE 40 MCG/MIN: 10 INJECTION INTRAVENOUS at 14:16

## 2020-01-15 RX ADMIN — PHENYLEPHRINE HYDROCHLORIDE 200 MCG: 10 INJECTION INTRAVENOUS at 14:22

## 2020-01-15 RX ADMIN — BUPIVACAINE HYDROCHLORIDE AND EPINEPHRINE 1.6 ML: 2.5; 5 INJECTION, SOLUTION EPIDURAL; INFILTRATION; INTRACAUDAL; PERINEURAL at 14:15

## 2020-01-15 RX ADMIN — DOCUSATE SODIUM 100 MG: 100 CAPSULE, LIQUID FILLED ORAL at 20:42

## 2020-01-15 RX ADMIN — FENTANYL CITRATE 25 MCG: 50 INJECTION, SOLUTION INTRAMUSCULAR; INTRAVENOUS at 17:16

## 2020-01-15 RX ADMIN — TRANEXAMIC ACID 1 G: 1 INJECTION, SOLUTION INTRAVENOUS at 14:45

## 2020-01-15 RX ADMIN — Medication 250 MILLI-UNITS/MIN: at 14:45

## 2020-01-15 RX ADMIN — PROPOFOL 20 MG: 10 INJECTION, EMULSION INTRAVENOUS at 15:20

## 2020-01-15 RX ADMIN — OXYCODONE HYDROCHLORIDE AND ACETAMINOPHEN 1 TABLET: 5; 325 TABLET ORAL at 20:42

## 2020-01-15 NOTE — PLAN OF CARE
Problem: PAIN - ADULT  Goal: Verbalizes/displays adequate comfort level or baseline comfort level  Description  Interventions:  - Encourage patient to monitor pain and request assistance  - Assess pain using appropriate pain scale  - Administer analgesics based on type and severity of pain and evaluate response  - Implement non-pharmacological measures as appropriate and evaluate response  - Consider cultural and social influences on pain and pain management  - Notify physician/advanced practitioner if interventions unsuccessful or patient reports new pain  Outcome: Progressing     Problem: INFECTION - ADULT  Goal: Absence or prevention of progression during hospitalization  Description  INTERVENTIONS:  - Assess and monitor for signs and symptoms of infection  - Monitor lab/diagnostic results  - Monitor all insertion sites, i e  indwelling lines, tubes, and drains  - Monitor endotracheal if appropriate and nasal secretions for changes in amount and color  - [place] appropriate cooling/warming therapies per order  - Administer medications as ordered  - Instruct and encourage patient and family to use good hand hygiene technique  - Identify and instruct in appropriate isolation precautions for identified infection/condition  Outcome: Progressing  Goal: Absence of fever/infection during neutropenic period  Description  INTERVENTIONS:  - Monitor WBC    Outcome: Progressing     Problem: SAFETY ADULT  Goal: Patient will remain free of falls  Description  INTERVENTIONS:  - Assess patient frequently for physical needs  -  Identify cognitive and physical deficits and behaviors that affect risk of falls    -  [place] fall precautions as indicated by assessment   - Educate patient/family on patient safety including physical limitations  - Instruct patient to call for assistance with activity based on assessment  - Modify environment to reduce risk of injury  - Consider OT/PT consult to assist with strengthening/mobility  Outcome: Progressing  Goal: Maintain or return to baseline ADL function  Description  INTERVENTIONS:  -  Assess patient's ability to carry out ADLs; assess patient's baseline for ADL function and identify physical deficits which impact ability to perform ADLs (bathing, care of mouth/teeth, toileting, grooming, dressing, etc )  - Assess/evaluate cause of self-care deficits   - Assess range of motion  - Assess patient's mobility; develop plan if impaired  - Assess patient's need for assistive devices and provide as appropriate  - Encourage maximum independence but intervene and supervise when necessary  - Involve family in performance of ADLs  - Assess for home care needs following discharge   - Consider OT consult to assist with ADL evaluation and planning for discharge  - Provide patient education as appropriate  Outcome: Progressing  Goal: Maintain or return mobility status to optimal level  Description  INTERVENTIONS:  - Assess patient's baseline mobility status (ambulation, transfers, stairs, etc )    - Identify cognitive and physical deficits and behaviors that affect mobility  - Identify mobility aids required to assist with transfers and/or ambulation (gait belt, sit-to-stand, lift, walker, cane, etc )  - [place] fall precautions as indicated by assessment  - Record patient progress and toleration of activity level on Mobility SBAR; progress patient to next Phase/Stage  - Instruct patient to call for assistance with activity based on assessment  - Consider rehabilitation consult to assist with strengthening/weightbearing, etc   Outcome: Progressing     Problem: Knowledge Deficit  Goal: Patient/family/caregiver demonstrates understanding of disease process, treatment plan, medications, and discharge instructions  Description  Complete learning assessment and assess knowledge base    Interventions:  - Provide teaching at level of understanding  - Provide teaching via preferred learning methods  Outcome: Progressing     Problem: DISCHARGE PLANNING  Goal: Discharge to home or other facility with appropriate resources  Description  INTERVENTIONS:  - Identify barriers to discharge w/patient and caregiver  - Arrange for needed discharge resources and transportation as appropriate  - Identify discharge learning needs (meds, wound care, etc )  - Arrange for interpretive services to assist at discharge as needed  - Refer to Case Management Department for coordinating discharge planning if the patient needs post-hospital services based on physician/advanced practitioner order or complex needs related to functional status, cognitive ability, or social support system  Outcome: Progressing     Problem: Potential for Falls  Goal: Patient will remain free of falls  Description  INTERVENTIONS:  - Assess patient frequently for physical needs  -  Identify cognitive and physical deficits and behaviors that affect risk of falls    -  [place] fall precautions as indicated by assessment   - Educate patient/family on patient safety including physical limitations  - Instruct patient to call for assistance with activity based on assessment  - Modify environment to reduce risk of injury  - Consider OT/PT consult to assist with strengthening/mobility  Outcome: Progressing     Problem: POSTPARTUM  Goal: Experiences normal postpartum course  Description  INTERVENTIONS:  - Monitor maternal vital signs  - Assess uterine involution and lochia  Outcome: Progressing  Goal: Appropriate maternal -  bonding  Description  INTERVENTIONS:  - Identify family support  - Assess for appropriate maternal/infant bonding   -Encourage maternal/infant bonding opportunities  - Referral to  or  as needed  Outcome: Progressing  Goal: Establishment of infant feeding pattern  Description  INTERVENTIONS:  - Assess breast/bottle feeding  - Refer to lactation as needed  Outcome: Progressing  Goal: Incision(s), wounds(s) or drain site(s) healing without S/S of infection  Description  INTERVENTIONS  - Assess and document risk factors for skin impairment   - Assess and document dressing, incision, wound bed, drain sites and surrounding tissue  - Consider nutrition services referral as needed  - Oral mucous membranes remain intact  - Provide patient/ family education  Outcome: Progressing     Problem: BIRTH - VAGINAL/ SECTION  Goal: Fetal and maternal status remain reassuring during the birth process  Description  INTERVENTIONS:  - Monitor vital signs  - Monitor fetal heart rate  - Monitor uterine activity  - Monitor labor progression (vaginal delivery)  - DVT prophylaxis  - Antibiotic prophylaxis  Outcome: Completed  Goal: Emotionally satisfying birthing experience for mother/fetus  Description  Interventions:  - Assess, plan, implement and evaluate the nursing care given to the patient in labor  - Advocate the philosophy that each childbirth experience is a unique experience and support the family's chosen level of involvement and control during the labor process   - Actively participate in both the patient's and family's teaching of the birth process  - Consider cultural, Taoism and age-specific factors and plan care for the patient in labor  Outcome: Completed

## 2020-01-15 NOTE — ANESTHESIA POSTPROCEDURE EVALUATION
Post-Op Assessment Note    CV Status:  Stable  Pain Score: 2    Pain management: adequate     Mental Status:  Alert   Hydration Status:  Stable   PONV Controlled:  None   Airway Patency:  Patent and adequate   Post Op Vitals Reviewed: Yes      Staff: Anesthesiologist           BP   111/64   Temp   97 7   Pulse  65   Resp   10   SpO2   100

## 2020-01-15 NOTE — PLAN OF CARE
Problem: PAIN - ADULT  Goal: Verbalizes/displays adequate comfort level or baseline comfort level  Description  Interventions:  - Encourage patient to monitor pain and request assistance  - Assess pain using appropriate pain scale  - Administer analgesics based on type and severity of pain and evaluate response  - Implement non-pharmacological measures as appropriate and evaluate response  - Consider cultural and social influences on pain and pain management  - Notify physician/advanced practitioner if interventions unsuccessful or patient reports new pain  Outcome: Progressing     Problem: INFECTION - ADULT  Goal: Absence or prevention of progression during hospitalization  Description  INTERVENTIONS:  - Assess and monitor for signs and symptoms of infection  - Monitor lab/diagnostic results  - Monitor all insertion sites, i e  indwelling lines, tubes, and drains  - Monitor endotracheal if appropriate and nasal secretions for changes in amount and color  - [place] appropriate cooling/warming therapies per order  - Administer medications as ordered  - Instruct and encourage patient and family to use good hand hygiene technique  - Identify and instruct in appropriate isolation precautions for identified infection/condition  Outcome: Progressing  Goal: Absence of fever/infection during neutropenic period  Description  INTERVENTIONS:  - Monitor WBC    Outcome: Progressing     Problem: SAFETY ADULT  Goal: Patient will remain free of falls  Description  INTERVENTIONS:  - Assess patient frequently for physical needs  -  Identify cognitive and physical deficits and behaviors that affect risk of falls    -  [place] fall precautions as indicated by assessment   - Educate patient/family on patient safety including physical limitations  - Instruct patient to call for assistance with activity based on assessment  - Modify environment to reduce risk of injury  - Consider OT/PT consult to assist with strengthening/mobility  Outcome: Progressing  Goal: Maintain or return to baseline ADL function  Description  INTERVENTIONS:  -  Assess patient's ability to carry out ADLs; assess patient's baseline for ADL function and identify physical deficits which impact ability to perform ADLs (bathing, care of mouth/teeth, toileting, grooming, dressing, etc )  - Assess/evaluate cause of self-care deficits   - Assess range of motion  - Assess patient's mobility; develop plan if impaired  - Assess patient's need for assistive devices and provide as appropriate  - Encourage maximum independence but intervene and supervise when necessary  - Involve family in performance of ADLs  - Assess for home care needs following discharge   - Consider OT consult to assist with ADL evaluation and planning for discharge  - Provide patient education as appropriate  Outcome: Progressing  Goal: Maintain or return mobility status to optimal level  Description  INTERVENTIONS:  - Assess patient's baseline mobility status (ambulation, transfers, stairs, etc )    - Identify cognitive and physical deficits and behaviors that affect mobility  - Identify mobility aids required to assist with transfers and/or ambulation (gait belt, sit-to-stand, lift, walker, cane, etc )  - [place] fall precautions as indicated by assessment  - Record patient progress and toleration of activity level on Mobility SBAR; progress patient to next Phase/Stage  - Instruct patient to call for assistance with activity based on assessment  - Consider rehabilitation consult to assist with strengthening/weightbearing, etc   Outcome: Progressing     Problem: Knowledge Deficit  Goal: Patient/family/caregiver demonstrates understanding of disease process, treatment plan, medications, and discharge instructions  Description  Complete learning assessment and assess knowledge base    Interventions:  - Provide teaching at level of understanding  - Provide teaching via preferred learning methods  Outcome: Progressing     Problem: DISCHARGE PLANNING  Goal: Discharge to home or other facility with appropriate resources  Description  INTERVENTIONS:  - Identify barriers to discharge w/patient and caregiver  - Arrange for needed discharge resources and transportation as appropriate  - Identify discharge learning needs (meds, wound care, etc )  - Arrange for interpretive services to assist at discharge as needed  - Refer to Case Management Department for coordinating discharge planning if the patient needs post-hospital services based on physician/advanced practitioner order or complex needs related to functional status, cognitive ability, or social support system  Outcome: Progressing     Problem: BIRTH - VAGINAL/ SECTION  Goal: Fetal and maternal status remain reassuring during the birth process  Description  INTERVENTIONS:  - Monitor vital signs  - Monitor fetal heart rate  - Monitor uterine activity  - Monitor labor progression (vaginal delivery)  - DVT prophylaxis  - Antibiotic prophylaxis  Outcome: Progressing  Goal: Emotionally satisfying birthing experience for mother/fetus  Description  Interventions:  - Assess, plan, implement and evaluate the nursing care given to the patient in labor  - Advocate the philosophy that each childbirth experience is a unique experience and support the family's chosen level of involvement and control during the labor process   - Actively participate in both the patient's and family's teaching of the birth process  - Consider cultural, [place] and age-specific factors and plan care for the patient in labor  Outcome: Progressing     Problem: Potential for Falls  Goal: Patient will remain free of falls  Description  INTERVENTIONS:  - Assess patient frequently for physical needs  -  Identify cognitive and physical deficits and behaviors that affect risk of falls    -  [place] fall precautions as indicated by assessment   - Educate patient/family on patient safety including physical limitations  - Instruct patient to call for assistance with activity based on assessment  - Modify environment to reduce risk of injury  - Consider OT/PT consult to assist with strengthening/mobility  Outcome: Progressing

## 2020-01-15 NOTE — PROGRESS NOTES
[id number] [address]: Ms Sabina Alonso was seen today at 38w1d for fetal growth assessment ultrasound  See ultrasound report under "OB Procedures" tab  She was dispositioned to L&D for delivery given severe symptomatic polyhydramnios  Please don't hesitate to contact our office with any concerns or questions    Saranya Vann MD

## 2020-01-15 NOTE — DISCHARGE SUMMARY
Discharge Summary - Marilyn Casas 29 y o  female MRN: [id number]    Unit/Bed#: LD PACU-01 Encounter: [id number]    Admission Date: 1/15/2020     Discharge Date: 20    Admitting Diagnosis:   1  Pregnancy at 38w1d  2  Previous  section complicating pregnancy [R39 363]  3  Polyhydramnios  4  History of methadone use  5  Asthma  6  Suspicion for macrosomia  7  Obesity  8  Transverse presentation  9  Hashimoto's thyroiditis    Discharge Diagnosis: same, delivered    Procedures: repeat  section, low transverse incision    Admitting Attending: Dg Wiley MD  Discharging Attending: [facility] Course:     Marilyn Casas is a 29 y o  Daril Lover at 38w1d wks who was initially admitted for repeat  section for symptomatic polyhydramnios, history of prior  section, suspicion of macrosomia and transverse presentation  She delivered a viable female  on 1/15/2020 at [id number] [phone]   Weight 9lbs 12oz via repeat  section, low transverse incision  Apgars were 9 (1 min) and 9 (5 min)   was transferred to  nursery  Patient tolerated the procedure well and was transferred to recovery in stable condition  Her post-operative course was complicated by no problems  Preoperative hemaglobin was 9 2, postoperative was 7 9  Her postoperative pain was well controlled with oral analgesics  On day of discharge, she was ambulating and able to reasonably perform all ADLs  She was voiding and was passing gas  Pain was well controlled  She was discharged home on post-operative day #2 without complications  Patient was instructed to follow up with her OB as an outpatient and was given appropriate warnings to call provider if she develops signs of infection or uncontrolled pain  Complications: none apparent    Condition at discharge: stable     Discharge Medications:   Prenatal vitamin daily for 6 months or the duration of nursing whichever is longer    Motrin 600 mg orally every 6 hours as needed for pain  Tylenol (over the counter) per bottle directions as needed for pain  Hydrocortisone cream 1% (over the counter) applied 1-2x daily to hemorrhoids as needed  Witch hazel pads for hemorrhoidal discomfort as needed    Discharge instructions :   -Do not place anything (no partner, tampons or douche) in your vagina for 6 weeks  -You may walk for exercise for the first 6 weeks then gradually return to your usual activities    -Please do not drive for 1 week if you have no stitches and for 2 weeks if you have stitches or underwent a  delivery     -You may take baths or shower per your preference    -Please look at your bust (breasts) in the mirror daily and call provider for redness or tenderness or increased warmth  - If you have had a  please look at your incision daily as well and call provider for increasing redness or steady drainage from the incision    -Please call your provider if temperature > 100 4*F or 38* C, worsening pain or a foul discharge  Provisions for Follow-Up Care:  See after visit summary for information related to follow-up care and any pertinent home health orders  Disposition: Home    Planned Readmission: No    Case and note reviewed agree

## 2020-01-15 NOTE — OP NOTE
OPERATIVE REPORT  PATIENT NAME: Cassandra Sorto    :  1985  MRN: [id number]  Pt Location: [facility] L&D OR ROOM 02    SURGERY DATE: 1/15/2020    Surgeon(s) and Role:     * Timothy Bunn MD - Primary     * Wilbern Schlatter, MD - Assisting    Preop Diagnosis:  IUP at 38w1d  Previous  section complicating pregnancy [M54 394]  Polyhydramnios  History of methadone use  Asthma  Suspicion for macrosomia  Obesity  Anemia  Transverse presentation    Post-Op Diagnosis Codes:  IUP at 38w1d  Previous  section complicating pregnancy [Y09 241]  Polyhydramnios  History of methadone use  Asthma  Suspicion for macrosomia  Obesity  Anemia  Transverse presentation    Procedure(s) (LRB):   SECTION () REPEAT (N/A)    Specimen(s):  ID Type Source Tests Collected by Time Destination   A :  Cord Blood Cord BLOOD GAS, VENOUS, CORD, BLOOD GAS, ARTERIAL, CORD Timothy Bunn MD 1/15/2020 1444    B :  Tissue (Placenta on Hold) OB Only Placenta PLACENTA IN STORAGE Timothy Bunn MD 1/15/2020 1447        QBL; 1043cc    Drains:  Urethral Catheter Non-latex 16 Fr  (Active)   Site Assessment Clean;Skin intact 1/15/2020  2:25 PM   Collection Container Standard drainage bag 1/15/2020  2:25 PM   Number of days: 0       Anesthesia Type:   Spinal    Operative Indications:  Previous  section complicating pregnancy [E97 748]  Symptomatic Polyhydramnios    Operative Findings:  1  Delivery of viable female on 01/15/20 at 1443, weight 9lbs 12 3oz;  Apgar scores of 9 at one minute and 9 at five minutes  2  Normal appearing placenta with centrally-inserted 3 vessel cord  3  Clear amniotic fluid  4  Grossly normal uterus, tubes, and ovaries    Complications:   None    Procedure and Technique:    Decision was made to proceed with  section due to symptomatic polyhydramnios and  anemia  Patient was made aware of these findings and the proposed plan   Risks, benefits, possible complications, alternate treatment options, and expected outcomes were discussed with the patient  The patient agreed with the proposed plan and gave informed consent  The patient was taken to the operating room where she was properly identified to the OR staff and attending physician  She received her spinal anesthesia by Dr Chidi Glover preoperatively  Fetal heart tones were appreciated and found to be appropriate  A Barth catheter was aseptically inserted and SCDs were placed, vagina was prepped with chorhexadine solution  The abdomen was prepped with Chloraprep and following appropriate drying time, the patient was draped in the usual sterile manner for a Pfannenstiel incision  The patient had received Ancef 2g IV pre-operatively for prophylaxis  A Time Out was held and the above information confirmed  The patient was identified as Nish Reji and the procedure verified as  Delivery  A Pfannenstiel incision was made and carried down through the underlying subcutaneous tissue to the fascia using a scalpel  Rectus fascia was then incised in the midline and extended laterally using Jonas scissors  The superior edge of the fascia was grasped with Kocher clamps, tented upward, and the underlying muscle was bluntly dissected off  The inferior edge was grasped with Kocher clamps and cleared in similar fashion  All anatomic layers were well-demarcated  The rectus muscles were  and the peritoneum was identified and subsequently entered and extended longitudinally with blunt dissection  The vesicouterine peritoneum was identified and a bladder flap was created using Metzenbaum scissors  The Oneil retractor was inserted  A low transverse uterine incision was made with the scalpel and extended laterally with blunt dissection  The amnion was entered sharply    Surgeons hand was inserted through the hysterotomy and the fetal head was palpated on patient's right, rotated to vertex, and delivered through the uterine incision with the assistance of fundal pressure  Nuchal times one reduced on delivery  Baby had spontaneous cry with good color and tone  After appropriate delay, the umbilical cord was clamped and cut  The infant was handed off to the  providers  Arterial and venous cord gases, cord blood, and a segment of umbilical cord were obtained for evaluation and promptly sent to the lab  The placenta delivered spontaneously with uterine fundal massage and was noted to have a centrally inserted 3 vessel cord  This was also sent to storage  A moist lap sponge was used to clear the cavity of clots and products of conception  The uterine incision was closed with a running locked suture of 0 Monocryl  A second layer of the same suture was used to imbricate the first  A single figure of 8 was used on the left side of the incision for hemostasis  Good hemostasis was confirmed upon uterine closure  Warmed normal saline solution was used to irrigate and the Oneil retractor was removed  The fascia was closed with two running suture of 0 Vicryl which met to the right of the midline  The skin was closed with 4-0 vicryl in a subcuticular fashion  Exofin was applied to the incision  At the conclusion of the procedure, all needle, sponge, and instrument counts were noted to be correct x2  The patient tolerated the procedure well and was transferred to her the recovery room in stable condition  Dr Reji Monaco was present and participated in all key portions of the case  Patient Disposition:  PACU     SIGNATURE: Kaur Funez MD  DATE: January 15, 2020  TIME: 3:39 PM    Case and note reviewed agree  I was present and participated in the entire case

## 2020-01-15 NOTE — H&P
H&P Exam - Obstetrics   Adonis Howell 29 y o  female MRN: [id number]  Unit/Bed#: LD Triage  Encounter: [id number]    Assessment/Plan     History of Present Illness   Chief Complaint: repeat c/s    HPI:  Adonis Howell is a 29 y o  Kalina Treva P2 female with an SAMANTA of 2020, by Last Menstrual Period at 38w1d weeks gestation who is being admitted for repeat  section due to symptomatic polyhydramnios (patient has been mostly bed bound for about 2 weeks) and respiratory embarrassment     Her current obstetrical history is significant for polyhydramnios, macrosomia, obesity  Contractions: occasional   Leakage of fluid: None  Bleeding: None  Fetal movement: present  Pregnancy complications: polyhydramios macrosomia      Review of Systems    Historical Information   OB History    Para Term  AB Living   7 2 2   4 2   SAB TAB Ectopic Multiple Live Births   2   2          # Outcome Date GA Lbr Kwabena/2nd Weight Sex Delivery Anes PTL Lv   7 Current            6 Term 13 39w0d   F CS-LTranv         Complications: Macrosomia, Polyhydramnios   5 Ectopic            4 Ectopic 2009           3 SAB      SAB      2 Term 10/17/04 41w0d  3941 g (8 lb 11 oz) M Vag-Spont      1 2003     SAB        Baby complications/comments:   Past Medical History:   Diagnosis Date    Anxiety     Chronic pain     left hip    Ectopic pregnancy with intrauterine pregnancy      and     Hashimoto's disease     Hashimoto's thyroiditis     IBS (irritable bowel syndrome)     Pituitary tumor     Polycystic ovarian syndrome      Past Surgical History:   Procedure Laterality Date     SECTION      DERMOID CYST  EXCISION      ECTOPIC PREGNANCY SURGERY       Social History   Social History     Substance and Sexual Activity   Alcohol Use No     Social History     Substance and Sexual Activity   Drug Use No     Social History     Tobacco Use   Smoking Status Former Smoker    Packs/day: 0 00    Types: Cigarettes   Smokeless Tobacco Never Used         Meds/Allergies   PTA meds:   Prior to Admission Medications   Prescriptions Last Dose Informant Patient Reported? Taking? Prenatal Vit-Iron Carbonyl-FA (PRENATAL MULTIVITAMIN) TABS  Self Yes No   Sig: Take 1 tablet by mouth daily   SYNTHROID 200 MCG tablet  Self Yes No   Sig: Take 300 mcg by mouth daily    acetaminophen (TYLENOL) 500 mg tablet  Self Yes No   Sig: Take 500 mg by mouth every 6 (six) hours as needed for mild pain, moderate pain or headaches      Facility-Administered Medications: None     Allergies   Allergen Reactions    Demerol [Meperidine] Other (See Comments)     "hot and trouble breathing"    Ketorolac Hives    Lactose GI Intolerance    Latex Rash    Lyrica [Pregabalin] Other (See Comments)     "suicidal thoughts"    Morphine And Related Rash    Other      Adhesive tape       Objective   Vitals: Blood pressure 130/70, pulse 86, temperature 98 2 °F (36 8 °C), temperature source Oral, resp  rate 18, last menstrual period 04/23/2019, not currently breastfeeding  There is no height or weight on file to calculate BMI      Invasive Devices     None                 Physical Exam    Prenatal Labs:   Blood Type:   Lab Results   Component Value Date/Time    ABO Grouping O 07/01/2019 01:39 PM     , D (Rh type):   Lab Results   Component Value Date/Time    Rh Type Positive 07/01/2019 01:39 PM     , Antibody Screen: neg  Lab Results   Component Value Date/Time    Hematocrit 30 7 (L) 11/22/2019 10:25 AM    Hemoglobin 10 1 (L) 11/22/2019 10:25 AM      , Platelets:   Lab Results   Component Value Date/Time    Platelets 040 00/83/3046 10:25 AM      , 1 hour Glucola:   Lab Results   Component Value Date/Time    Glucose 103 12/04/2019 01:27 PM   , Rubella: neg  Lab Results   Component Value Date/Time    RPR Non Reactive 07/01/2019 01:39 PM      , Hep B: neg  HIV neg  Lab Results   Component Value Date/Time    Strep Grp B KEILY Negative 01/03/2020 01:55 PM Assessment:  28 y/o  at 38 1/7 weeks prior c/s X 2 with symptomatic polyhydramnios and respiratory embarrassment for repeat c/s  Plan:  Repeat  section  Patient declined tubal ligation and desires delayed cord clamping, reviewed

## 2020-01-15 NOTE — ANESTHESIA PROCEDURE NOTES
Spinal Block    Patient location during procedure: OR  Start time: 1/15/2020 2:15 PM  Reason for block: procedure for pain and at surgeon's request  Staffing  Anesthesiologist: Paul Ogden MD  Performed: anesthesiologist   Preanesthetic Checklist  Completed: patient identified, site marked, surgical consent, pre-op evaluation, timeout performed, IV checked, risks and benefits discussed and monitors and equipment checked  Spinal Block  Patient position: sitting  Prep: ChloraPrep  Patient monitoring: cardiac monitor and frequent blood pressure checks  Approach: midline  Location: L3-4  Injection technique: single-shot  Needle  Needle type: pencil-tip   Needle gauge: 25 G  Needle length: 10 cm  Assessment  Sensory level: T4  Injection Assessment:  negative aspiration for heme, no paresthesia on injection and positive aspiration for clear CSF    Post-procedure:  adhesive bandage applied, pressure dressing applied, secured with tape, site cleaned and sterile dressing applied

## 2020-01-15 NOTE — DISCHARGE INSTRUCTIONS
Section   WHAT YOU SHOULD KNOW:   A  delivery, or , is abdominal surgery to deliver your baby  There are many reasons you may need a   · A  may be scheduled before labor if you had a  with your last baby  It may be scheduled if your baby is not positioned normally, or you are pregnant with more than 1 baby  · Your caregiver may perform an emergency  during labor to prevent life-threatening complications for you or your baby  A  may be done if your cervix does not dilate after several hours of active labor  · Other reasons for a  include maternal infections and problems with the placenta  AFTER YOU LEAVE:   Medicines:   · Prescription pain medicine  may be given  Ask how to take this medicine safely  · Acetaminophen  decreases pain and fever  It is available without a doctor's order  Ask how much to take and how often to take it  Follow directions  Acetaminophen can cause liver damage if not taken correctly  · NSAIDs  help decrease swelling and pain or fever  This medicine is available with or without a doctor's order  NSAIDs can cause stomach bleeding or kidney problems in certain people  If you take blood thinner medicine, always ask your obstetrician if NSAIDs are safe for you  Always read the medicine label and follow directions  · Take your medicine as directed  Contact your obstetrician (OB) if you think your medicine is not helping or if you have side effects  Tell him if you are allergic to any medicine  Keep a list of the medicines, vitamins, and herbs you take  Include the amounts, and when and why you take them  Bring the list or the pill bottles to follow-up visits  Carry your medicine list with you in case of an emergency  Follow up with your OB as directed: You may need to return to have your stitches or staples removed  Write down your questions so you remember to ask them during your visits    Wound care:  Carefully wash your wound with soap and water every day  Keep your wound clean and dry  Wear loose, comfortable clothes that do not rub against your wound  Ask your OB about bathing and showering  Drink plenty of liquids: You can lower your risk for a blood clot if you drink plenty of liquids  Ask how much liquid to drink each day and which liquids are best for you  Limit activity until you have fully recovered from surgery:   · Ask when it is safe for you to drive, walk up stairs, lift heavy objects, and have sex  · Ask when it is okay to exercise, and what types of exercise to do  Start slowly and do more as you get stronger  Contact your OB if:   · You have heavy vaginal bleeding that fills 1 or more sanitary pads in 1 hour  · You have a fever  · Your incision is swollen, red, or draining pus  · You have questions or concerns about yourself or your baby  Seek care immediately or call 911 if:   · Blood soaks through your bandage  · Your stitches come apart  · You feel lightheaded, short of breath, and have chest pain  · You cough up blood  · Your arm or leg feels warm, tender, and painful  It may look swollen and red  [address] is for End User's use only and may not be sold, redistributed or otherwise used for commercial purposes  All illustrations and images included in CareNotes® are the copyrighted property of A D A M [employer] Inc  or Pavel Cerv[employer]  The above information is an  only  It is not intended as medical advice for individual conditions or treatments  Talk to your doctor, nurse or pharmacist before following any medical regimen to see if it is safe and effective for you  Postpartum Bleeding   WHAT YOU NEED TO KNOW:   Postpartum bleeding is vaginal bleeding after childbirth  This bleeding is normal, whether your baby was born vaginally or by    It contains blood and the tissue that lined the inside of your uterus when you were pregnant  DISCHARGE INSTRUCTIONS:   What to expect with postpartum bleeding:  Postpartum bleeding usually lasts at least 10 days, and may last longer than 6 weeks  Your bleeding may range from light (barely staining a pad) to heavy (soaking a pad in 1 hour)  Usually, you have heavier bleeding right after childbirth, which slows over the next few weeks until it stops  The bleeding is red or dark brown with clots for the first 1 to 3 days  It then turns pink for several days, and then becomes a white or yellow discharge until it ends  Follow up with your obstetrician as directed:  Do not have sex until your obstetrician says it is okay  Write down your questions so you remember to ask them during your visits  Contact your healthcare provider or obstetrician if:   · Your bleeding increases, or you have heavy bleeding that soaks a pad in 1 hour for 2 hours in a row  · You pass large blood clots  · You are breathing faster than normal, or your heart is beating faster than normal     · You are urinating less than usual, or not at all  · You feel dizzy  · You have questions or concerns about your condition or care  Seek immediate care or call 911 if:   · You are suddenly short of breath and feel lightheaded  · You have sudden chest pain  © 2017 [address] [facility] Information is for End User's use only and may not be sold, redistributed or otherwise used for commercial purposes  All illustrations and images included in CareNotes® are the copyrighted property of A D [employer] M [employer]  or Pavel Cervantes  The above information is an  only  It is not intended as medical advice for individual conditions or treatments  Talk to your doctor, nurse or pharmacist before following any medical regimen to see if it is safe and effective for you        Postpartum Depression   WHAT YOU NEED TO KNOW:   Postpartum depression is a mood disorder that occurs after giving birth  A mood is an emotion or a feeling  Moods affect your behavior and how you feel about yourself and life in general  Depression is a sad mood that you cannot control  Women often feel sad, afraid, or nervous after their baby is born  These feelings are called postpartum blues or baby blues, and they usually go away in 1 to 2 weeks  With postpartum depression, these symptoms get worse and continue for more than 2 weeks  Postpartum depression is a serious condition that affects your daily activities and relationships  DISCHARGE INSTRUCTIONS:   Medicines:   · Antidepressants: These medicines are given to decrease or stop the symptoms of depression  You usually need to take antidepressants for several weeks before you begin to feel better  Do not stop taking antidepressants unless your healthcare provider tells you to  Healthcare providers may try a different antidepressant if one type does not work  · Take your medicine as directed  Contact your healthcare provider if you think your medicine is not helping or if you have side effects  Tell him of her if you are allergic to any medicine  Keep a list of the medicines, vitamins, and herbs you take  Include the amounts, and when and why you take them  Bring the list or the pill bottles to follow-up visits  Carry your medicine list with you in case of an emergency  Follow up with your healthcare provider or psychiatrist as directed:  Write down your questions so you remember to ask them during your visits  Psychotherapy:  During therapy, you will talk with healthcare providers about how to cope with your feelings and moods  This can be done alone or in a group  It may also be done with family members or your partner  Self-care:   · Rest:  Do not try to do everything all at the same time  Do only what is needed and let other things wait until later  Ask your family or friends for help, especially if you have other children   Ask your partner to help with night feedings or other baby care  Try to sleep when the baby naps  · Get emotional support:  Share your feelings with your partner, a friend, or another mother  · Take care of yourself:  Shower and dress each day  Do not skip meals  Try to get out of the house a little each day  Get regular exercise  Eat a healthy diet  Avoid alcohol because it can make your depression worse  Do not isolate yourself  Go for a walk or meet with a friend  It is also important that you have some time by yourself each day  For support and more information:   · [address] [place] [address], Public Information & Communication Branch  [address], [address], [address]  [place] [address]   Phone: [phone]  Phone: [phone]  Web Address: [employer] tn  Contact your healthcare provider or psychiatrist if:   · You cannot make it to your next visit  · Your depression does not get better with treatment or it gets worse  · You have questions or concerns about your condition or care  Return to the emergency department if:   · You think about hurting or killing yourself, your baby, or someone else  · You feel like other people want to hurt you  · You hear voices telling you to hurt yourself or your baby  © 2017 [address] [facility] Information is for End User's use only and may not be sold, redistributed or otherwise used for commercial purposes  All illustrations and images included in CareNotes® are the copyrighted property of A D A M , Inc  or Pavel Cervantes  The above information is an  only  It is not intended as medical advice for individual conditions or treatments  Talk to your doctor, nurse or pharmacist before following any medical regimen to see if it is safe and effective for you

## 2020-01-16 LAB
BASOPHILS # BLD AUTO: 0.08 THOUSANDS/ΜL (ref 0–0.1)
BASOPHILS NFR BLD AUTO: 1 % (ref 0–1)
EOSINOPHIL # BLD AUTO: 0.35 THOUSAND/ΜL (ref 0–0.61)
EOSINOPHIL NFR BLD AUTO: 2 % (ref 0–6)
ERYTHROCYTE [DISTWIDTH] IN BLOOD BY AUTOMATED COUNT: 16.1 % (ref 11.6–15.1)
HCT VFR BLD AUTO: 24.8 % (ref 34.8–46.1)
HGB BLD-MCNC: 7.9 G/DL (ref 11.5–15.4)
IMM GRANULOCYTES # BLD AUTO: 0.12 THOUSAND/UL (ref 0–0.2)
IMM GRANULOCYTES NFR BLD AUTO: 1 % (ref 0–2)
LYMPHOCYTES # BLD AUTO: 2.26 THOUSANDS/ΜL (ref 0.6–4.47)
LYMPHOCYTES NFR BLD AUTO: 16 % (ref 14–44)
MCH RBC QN AUTO: 27.3 PG (ref 26.8–34.3)
MCHC RBC AUTO-ENTMCNC: 31.9 G/DL (ref 31.4–37.4)
MCV RBC AUTO: 86 FL (ref 82–98)
MONOCYTES # BLD AUTO: 1.2 THOUSAND/ΜL (ref 0.17–1.22)
MONOCYTES NFR BLD AUTO: 8 % (ref 4–12)
NEUTROPHILS # BLD AUTO: 10.32 THOUSANDS/ΜL (ref 1.85–7.62)
NEUTS SEG NFR BLD AUTO: 72 % (ref 43–75)
NRBC BLD AUTO-RTO: 0 /100 WBCS
PLATELET # BLD AUTO: 212 THOUSANDS/UL (ref 149–390)
PMV BLD AUTO: 10.4 FL (ref 8.9–12.7)
RBC # BLD AUTO: 2.89 MILLION/UL (ref 3.81–5.12)
RPR SER QL: NORMAL
WBC # BLD AUTO: 14.33 THOUSAND/UL (ref 4.31–10.16)

## 2020-01-16 PROCEDURE — 99024 POSTOP FOLLOW-UP VISIT: CPT | Performed by: OBSTETRICS & GYNECOLOGY

## 2020-01-16 PROCEDURE — 85025 COMPLETE CBC W/AUTO DIFF WBC: CPT | Performed by: OBSTETRICS & GYNECOLOGY

## 2020-01-16 RX ORDER — OXYCODONE HYDROCHLORIDE 10 MG/1
10 TABLET ORAL EVERY 4 HOURS PRN
Status: DISCONTINUED | OUTPATIENT
Start: 2020-01-16 | End: 2020-01-17 | Stop reason: HOSPADM

## 2020-01-16 RX ORDER — POLYETHYLENE GLYCOL 3350 17 G/17G
17 POWDER, FOR SOLUTION ORAL DAILY PRN
Status: DISCONTINUED | OUTPATIENT
Start: 2020-01-16 | End: 2020-01-17 | Stop reason: HOSPADM

## 2020-01-16 RX ORDER — OXYCODONE HYDROCHLORIDE 5 MG/1
5 TABLET ORAL EVERY 4 HOURS PRN
Status: DISCONTINUED | OUTPATIENT
Start: 2020-01-16 | End: 2020-01-17 | Stop reason: HOSPADM

## 2020-01-16 RX ORDER — LEVOTHYROXINE SODIUM 0.12 MG/1
250 TABLET ORAL ONCE
Status: COMPLETED | OUTPATIENT
Start: 2020-01-17 | End: 2020-01-17

## 2020-01-16 RX ORDER — LEVOTHYROXINE SODIUM 0.1 MG/1
300 TABLET ORAL ONCE
Status: COMPLETED | OUTPATIENT
Start: 2020-01-16 | End: 2020-01-16

## 2020-01-16 RX ADMIN — LEVOTHYROXINE SODIUM 300 MCG: 100 TABLET ORAL at 15:52

## 2020-01-16 RX ADMIN — POLYETHYLENE GLYCOL 3350 17 G: 17 POWDER, FOR SOLUTION ORAL at 08:50

## 2020-01-16 RX ADMIN — ACETAMINOPHEN 975 MG: 325 TABLET ORAL at 16:03

## 2020-01-16 RX ADMIN — DOCUSATE SODIUM 100 MG: 100 CAPSULE, LIQUID FILLED ORAL at 17:56

## 2020-01-16 RX ADMIN — IRON SUCROSE 200 MG: 20 INJECTION, SOLUTION INTRAVENOUS at 05:50

## 2020-01-16 RX ADMIN — ACETAMINOPHEN 975 MG: 325 TABLET ORAL at 08:43

## 2020-01-16 RX ADMIN — SODIUM CHLORIDE, SODIUM LACTATE, POTASSIUM CHLORIDE, AND CALCIUM CHLORIDE 125 ML/HR: .6; .31; .03; .02 INJECTION, SOLUTION INTRAVENOUS at 02:49

## 2020-01-16 RX ADMIN — HYDROMORPHONE HYDROCHLORIDE 0.4 MG: 1 INJECTION, SOLUTION INTRAMUSCULAR; INTRAVENOUS; SUBCUTANEOUS at 02:49

## 2020-01-16 RX ADMIN — DOCUSATE SODIUM 100 MG: 100 CAPSULE, LIQUID FILLED ORAL at 08:46

## 2020-01-16 RX ADMIN — OXYCODONE HYDROCHLORIDE AND ACETAMINOPHEN 1 TABLET: 5; 325 TABLET ORAL at 13:19

## 2020-01-16 RX ADMIN — HYDROMORPHONE HYDROCHLORIDE 0.4 MG: 1 INJECTION, SOLUTION INTRAMUSCULAR; INTRAVENOUS; SUBCUTANEOUS at 01:43

## 2020-01-16 RX ADMIN — HYDROMORPHONE HYDROCHLORIDE 0.4 MG: 1 INJECTION, SOLUTION INTRAMUSCULAR; INTRAVENOUS; SUBCUTANEOUS at 07:13

## 2020-01-16 RX ADMIN — ACETAMINOPHEN 650 MG: 325 TABLET ORAL at 02:17

## 2020-01-16 RX ADMIN — ENOXAPARIN SODIUM 40 MG: 40 INJECTION SUBCUTANEOUS at 05:51

## 2020-01-16 RX ADMIN — OXYCODONE HYDROCHLORIDE 10 MG: 10 TABLET ORAL at 19:58

## 2020-01-16 RX ADMIN — OXYCODONE HYDROCHLORIDE AND ACETAMINOPHEN 1 TABLET: 5; 325 TABLET ORAL at 06:04

## 2020-01-16 NOTE — PROGRESS NOTES
Progress Note - OB/GYN (retrospective note patient seen 750 am)  Post-Partum Physician Note   Jaz Beverage 29 y o  female MRN: [id number]  Unit/Bed#:  315-01 Encounter: [id number]    Subjective/Objective   Chief Complaint: Postpartum    Subjective: Pain is controlled with current analgesics  Medications being used: duramorph  Eating a regular diet without difficulty  Flatus Yes   Bowel movements are not yet  Voiding just had richey out this am difficulty  Ambulating No Breastfeeding Yes  Lochia Lochia  Lochia Color: Rubra  Amount: Minimal  Lochia Odor: None  Clots: None normal     Objective:    Vitals: Blood pressure 101/58, pulse 71, temperature 98 3 °F (36 8 °C), temperature source Oral, resp  rate 18, height 5' 6" (1 676 m), weight 101 kg (222 lb), last menstrual period 04/23/2019, SpO2 95 %, currently breastfeeding        Intake/Output Summary (Last 24 hours) at 1/16/2020 1455  Last data filed at 1/16/2020 1230  Gross per 24 hour   Intake 1000 ml   Output 6493 ml   Net -5493 ml       Physical Exam:  GEN: Jaz Beverage appears well, alert and oriented x 3, pleasant and cooperative    HEART: regular rhythm, normal S1 and S2, no murmurs, clicks, gallops or rubs   LUNGS: clear to auscultation bilaterally; no wheezes, rales, or rhonchi   ABDOMEN: normal bowel sounds, soft, no tenderness, no distention  : Uterus firm, non tender at umbilicus -1  Incision: Clean dry intact  EXTREMITIES: peripheral pulses normal; no edema      Labs:   Recent Results (from the past 24 hour(s))   CBC and differential    Collection Time: 01/16/20  5:44 AM   Result Value Ref Range    WBC 14 33 (H) 4 31 - 10 16 Thousand/uL    RBC 2 89 (L) 3 81 - 5 12 Million/uL    Hemoglobin 7 9 (L) 11 5 - 15 4 g/dL    Hematocrit 24 8 (L) 34 8 - 46 1 %    MCV 86 82 - 98 fL    MCH 27 3 26 8 - 34 3 pg    MCHC 31 9 31 4 - 37 4 g/dL    RDW 16 1 (H) 11 6 - 15 1 %    MPV 10 4 8 9 - 12 7 fL    Platelets 152 649 - 514 Thousands/uL    nRBC 0 /100 WBCs Neutrophils Relative 72 43 - 75 %    Immat GRANS % 1 0 - 2 %    Lymphocytes Relative 16 14 - 44 %    Monocytes Relative 8 4 - 12 %    Eosinophils Relative 2 0 - 6 %    Basophils Relative 1 0 - 1 %    Neutrophils Absolute 10 32 (H) 1 85 - 7 62 Thousands/µL    Immature Grans Absolute 0 12 0 00 - 0 20 Thousand/uL    Lymphocytes Absolute 2 26 0 60 - 4 47 Thousands/µL    Monocytes Absolute 1 20 0 17 - 1 22 Thousand/µL    Eosinophils Absolute 0 35 0 00 - 0 61 Thousand/µL    Basophils Absolute 0 08 0 00 - 0 10 Thousands/µL         MEDS:   Current Facility-Administered Medications   Medication Dose Route Frequency    acetaminophen (TYLENOL) tablet 650 mg  650 mg Oral Q6H PRN    acetaminophen (TYLENOL) tablet 650 mg  650 mg Oral Q4H PRN    acetaminophen (TYLENOL) tablet 975 mg  975 mg Oral Q8H [facility]    benzocaine-menthol-lanolin-aloe (DERMOPLAST) 20-0 5 % topical spray 1 application  1 application Topical X1V PRN    calcium carbonate (TUMS) chewable tablet 1,000 mg  1,000 mg Oral Daily PRN    diphenhydrAMINE (BENADRYL) tablet 25 mg  25 mg Oral Q6H PRN    docusate sodium (COLACE) capsule 100 mg  100 mg Oral BID    enoxaparin (LOVENOX) subcutaneous injection 40 mg  40 mg Subcutaneous Q24H ROGER    hydrocortisone 1 % cream 1 application  1 application Topical Daily PRN    HYDROmorphone (DILAUDID) injection 0 4 mg  0 4 mg Intravenous Q1H PRN    HYDROmorphone (DILAUDID) injection 1 mg  1 mg Intravenous Q2H PRN    lactated ringers infusion  125 mL/hr Intravenous Continuous    [START ON 1/17/2020] levothyroxine tablet 250 mcg  250 mcg Oral Once    levothyroxine tablet 300 mcg  300 mcg Oral Once    ondansetron (ZOFRAN) injection 4 mg  4 mg Intravenous Q8H PRN    oxyCODONE-acetaminophen (PERCOCET) 5-325 mg per tablet 1 tablet  1 tablet Oral Q4H PRN    oxyCODONE-acetaminophen (PERCOCET) 5-325 mg per tablet 1 tablet  1 tablet Oral Q4H PRN    oxyCODONE-acetaminophen (PERCOCET) 5-325 mg per tablet 2 tablet  2 tablet Oral Q4H PRN    polyethylene glycol (MIRALAX) packet 17 g  17 g Oral Daily PRN    simethicone (MYLICON) chewable tablet 80 mg  80 mg Oral 4x Daily PRN    witch hazel-glycerin (TUCKS) topical pad 1 pad  1 pad Topical Q4H PRN     Invasive Devices     Peripheral Intravenous Line            Peripheral IV 01/15/20 Left Forearm 1 day                  Assessment/Plan     Assessment:  Patient Active Problem List   Diagnosis    Chronic pain syndrome    Chronic left-sided low back pain with left-sided sciatica    Anxiety    Asthma    Chronic interstitial cystitis    Chronic pain    Osteoarthrosis, wrist    Increased nuchal translucency space on fetal ultrasound    Macrosomia affecting management of mother in third trimester    Status post repeat low transverse  section     delivery delivered       Plan:  1) Postpartum day #  1 status post repeat  section  2) Continue routine postpartum/post op care    3) Encourage ambulation    Maribell Ledezma MD  2020  2:55 PM

## 2020-01-16 NOTE — PLAN OF CARE
Problem: PAIN - ADULT  Goal: Verbalizes/displays adequate comfort level or baseline comfort level  Description  Interventions:  - Encourage patient to monitor pain and request assistance  - Assess pain using appropriate pain scale  - Administer analgesics based on type and severity of pain and evaluate response  - Implement non-pharmacological measures as appropriate and evaluate response  - Consider cultural and social influences on pain and pain management  - Notify physician/advanced practitioner if interventions unsuccessful or patient reports new pain  Outcome: Progressing     Problem: INFECTION - ADULT  Goal: Absence or prevention of progression during hospitalization  Description  INTERVENTIONS:  - Assess and monitor for signs and symptoms of infection  - Monitor lab/diagnostic results  - Monitor all insertion sites, i e  indwelling lines, tubes, and drains  - Monitor endotracheal if appropriate and nasal secretions for changes in amount and color  - [place] appropriate cooling/warming therapies per order  - Administer medications as ordered  - Instruct and encourage patient and family to use good hand hygiene technique  - Identify and instruct in appropriate isolation precautions for identified infection/condition  Outcome: Progressing  Goal: Absence of fever/infection during neutropenic period  Description  INTERVENTIONS:  - Monitor WBC    Outcome: Progressing     Problem: SAFETY ADULT  Goal: Patient will remain free of falls  Description  INTERVENTIONS:  - Assess patient frequently for physical needs  -  Identify cognitive and physical deficits and behaviors that affect risk of falls    -  [place] fall precautions as indicated by assessment   - Educate patient/family on patient safety including physical limitations  - Instruct patient to call for assistance with activity based on assessment  - Modify environment to reduce risk of injury  - Consider OT/PT consult to assist with strengthening/mobility  Outcome: Progressing  Goal: Maintain or return to baseline ADL function  Description  INTERVENTIONS:  -  Assess patient's ability to carry out ADLs; assess patient's baseline for ADL function and identify physical deficits which impact ability to perform ADLs (bathing, care of mouth/teeth, toileting, grooming, dressing, etc )  - Assess/evaluate cause of self-care deficits   - Assess range of motion  - Assess patient's mobility; develop plan if impaired  - Assess patient's need for assistive devices and provide as appropriate  - Encourage maximum independence but intervene and supervise when necessary  - Involve family in performance of ADLs  - Assess for home care needs following discharge   - Consider OT consult to assist with ADL evaluation and planning for discharge  - Provide patient education as appropriate  Outcome: Progressing  Goal: Maintain or return mobility status to optimal level  Description  INTERVENTIONS:  - Assess patient's baseline mobility status (ambulation, transfers, stairs, etc )    - Identify cognitive and physical deficits and behaviors that affect mobility  - Identify mobility aids required to assist with transfers and/or ambulation (gait belt, sit-to-stand, lift, walker, cane, etc )  - [place] fall precautions as indicated by assessment  - Record patient progress and toleration of activity level on Mobility SBAR; progress patient to next Phase/Stage  - Instruct patient to call for assistance with activity based on assessment  - Consider rehabilitation consult to assist with strengthening/weightbearing, etc   Outcome: Progressing     Problem: Knowledge Deficit  Goal: Patient/family/caregiver demonstrates understanding of disease process, treatment plan, medications, and discharge instructions  Description  Complete learning assessment and assess knowledge base    Interventions:  - Provide teaching at level of understanding  - Provide teaching via preferred learning methods  Outcome: Progressing     Problem: DISCHARGE PLANNING  Goal: Discharge to home or other facility with appropriate resources  Description  INTERVENTIONS:  - Identify barriers to discharge w/patient and caregiver  - Arrange for needed discharge resources and transportation as appropriate  - Identify discharge learning needs (meds, wound care, etc )  - Arrange for interpretive services to assist at discharge as needed  - Refer to Case Management Department for coordinating discharge planning if the patient needs post-hospital services based on physician/advanced practitioner order or complex needs related to functional status, cognitive ability, or social support system  Outcome: Progressing     Problem: Potential for Falls  Goal: Patient will remain free of falls  Description  INTERVENTIONS:  - Assess patient frequently for physical needs  -  Identify cognitive and physical deficits and behaviors that affect risk of falls    -  [place] fall precautions as indicated by assessment   - Educate patient/family on patient safety including physical limitations  - Instruct patient to call for assistance with activity based on assessment  - Modify environment to reduce risk of injury  - Consider OT/PT consult to assist with strengthening/mobility  Outcome: Progressing     Problem: POSTPARTUM  Goal: Experiences normal postpartum course  Description  INTERVENTIONS:  - Monitor maternal vital signs  - Assess uterine involution and lochia  Outcome: Progressing  Goal: Appropriate maternal -  bonding  Description  INTERVENTIONS:  - Identify family support  - Assess for appropriate maternal/infant bonding   -Encourage maternal/infant bonding opportunities  - Referral to  or  as needed  Outcome: Progressing  Goal: Establishment of infant feeding pattern  Description  INTERVENTIONS:  - Assess breast/bottle feeding  - Refer to lactation as needed  Outcome: Progressing  Goal: Incision(s), wounds(s) or drain site(s) healing without S/S of infection  Description  INTERVENTIONS  - Assess and document risk factors for skin impairment   - Assess and document dressing, incision, wound bed, drain sites and surrounding tissue  - Consider nutrition services referral as needed  - Oral mucous membranes remain intact  - Provide patient/ family education  Outcome: Progressing

## 2020-01-16 NOTE — LACTATION NOTE
This note was copied from a baby's chart  CONSULT - LACTATION  Baby Girl Anitha Bautista) Sabina Alonso 1 days female MRN: [id number]    [facility] Room / Bed: (N)/(N) Encounter: [id number]    Maternal Information     MOTHER:  Tram Parsons  Maternal Age: 29 y o    OB History: #: 1, Date: , Sex: None, Weight: None, GA: None, Delivery: Spontaneous , Apgar1: None, Apgar5: None, Living: None, Birth Comments: None    #: 2, Date: [birth year], Sex: Male, Weight: 3941 g (8 lb 11 oz), GA: 41w0d, Delivery: Vaginal, Spontaneous, Apgar1: None, Apgar5: None, Living: None, Birth Comments: None    #: 3, Date: , Sex: None, Weight: None, GA: None, Delivery: Spontaneous , Apgar1: None, Apgar5: None, Living: None, Birth Comments: None    #: 4, Date: , Sex: None, Weight: None, GA: None, Delivery: None, Apgar1: None, Apgar5: None, Living: None, Birth Comments: None    #: 5, Date: , Sex: None, Weight: None, GA: None, Delivery: None, Apgar1: None, Apgar5: None, Living: None, Birth Comments: None    #: 6, Date: 13, Sex: Female, Weight: None, GA: 39w0d, Delivery: , Low Transverse, Apgar1: None, Apgar5: None, Living: None, Birth Comments: None    #: 7, Date: 01/15/20, Sex: Female, Weight: 4430 g (9 lb 12 3 oz), GA: 38w1d, Delivery: , Low Transverse, Apgar1: 9, Apgar5: 9, Living: Living, Birth Comments: None   Previouse breast reduction surgery?  No    Lactation history:   Has patient previously breast fed: Yes   How long had patient previously breast fed: 3 years each for two children   Previous breast feeding complications: None     Past Surgical History:   Procedure Laterality Date     SECTION      DERMOID CYST  EXCISION      ECTOPIC PREGNANCY SURGERY      SD  DELIVERY ONLY N/A 1/15/2020    Procedure:  SECTION () REPEAT;  Surgeon: Kwabena Grajeda MD;  Location: [facility];  Service: Obstetrics Birth information:  YOB: 2020   Time of birth: 2:43 PM   Sex: female   Delivery type: , Low Transverse   Birth Weight: 4430 g (9 lb 12 3 oz)   Percent of Weight Change: -2%     Gestational Age: 43w4d   [unfilled]    Assessment     Breast and nipple assessment: not assessed at this time  [place] Assessment: lip tie noted, but Natan says it does not cause any problems  Feeding assessment: not assessed at this time  LATCH:  Latch: Grasps breast, tongue down, lips flanged, rhythmic sucking   Audible Swallowing: Spontaneous and intermittent (24 hours old)   Type of Nipple: Everted (After stimulation)   Comfort (Breast/Nipple): Soft/non-tender   Hold (Positioning): No assist from staff, mother able to position/hold infant   LATCH Score: 10          Feeding recommendations:  breast feed on demand     Natan says breast feeding is going well and declines Ready, Set, Baby booklet  Encouraged parents to call for assistance, questions, and concerns about breastfeeding  Extension provided    Oziel Dudley RN 2020 6:14 PM

## 2020-01-16 NOTE — SOCIAL WORK
Breast pump consult  Discussed freedom of choice and options with pt  Per pt request, Ameda pump ordered from [facility] via [facility] for delivery to room  CM reviewed d/c planning process including the following: identifying help at home, patient preference for d/c planning needs, Discharge Lounge, [facility] Meds to Bed program, availability of treatment team to discuss questions or concerns patient and/or family may have regarding understanding medications and recognizing signs and symptoms once discharged  CM also encouraged patient to follow up with all recommended appointments after discharge  Patient advised of importance for patient and family to participate in managing patient's medical well being  No other CM needs noted

## 2020-01-17 VITALS
DIASTOLIC BLOOD PRESSURE: 55 MMHG | SYSTOLIC BLOOD PRESSURE: 110 MMHG | HEIGHT: 66 IN | HEART RATE: 72 BPM | RESPIRATION RATE: 18 BRPM | BODY MASS INDEX: 35.68 KG/M2 | TEMPERATURE: 98 F | WEIGHT: 222 LBS | OXYGEN SATURATION: 97 %

## 2020-01-17 PROCEDURE — 99024 POSTOP FOLLOW-UP VISIT: CPT | Performed by: OBSTETRICS & GYNECOLOGY

## 2020-01-17 RX ORDER — IBUPROFEN 600 MG/1
600 TABLET ORAL EVERY 6 HOURS PRN
Qty: 30 TABLET | Refills: 0 | Status: SHIPPED | OUTPATIENT
Start: 2020-01-17 | End: 2020-02-10 | Stop reason: ALTCHOICE

## 2020-01-17 RX ORDER — ACETAMINOPHEN 325 MG/1
650 TABLET ORAL EVERY 6 HOURS PRN
Qty: 30 TABLET | Refills: 0
Start: 2020-01-17 | End: 2020-02-10 | Stop reason: ALTCHOICE

## 2020-01-17 RX ORDER — DOCUSATE SODIUM 100 MG/1
100 CAPSULE, LIQUID FILLED ORAL 2 TIMES DAILY
Qty: 10 CAPSULE | Refills: 0
Start: 2020-01-17 | End: 2020-02-10 | Stop reason: ALTCHOICE

## 2020-01-17 RX ORDER — OXYCODONE HYDROCHLORIDE 5 MG/1
5 TABLET ORAL EVERY 4 HOURS PRN
Qty: 18 TABLET | Refills: 0 | Status: SHIPPED | OUTPATIENT
Start: 2020-01-17 | End: 2020-01-24 | Stop reason: SDUPTHER

## 2020-01-17 RX ADMIN — ACETAMINOPHEN 650 MG: 325 TABLET ORAL at 13:58

## 2020-01-17 RX ADMIN — OXYCODONE HYDROCHLORIDE 10 MG: 10 TABLET ORAL at 00:04

## 2020-01-17 RX ADMIN — OXYCODONE HYDROCHLORIDE 10 MG: 10 TABLET ORAL at 09:53

## 2020-01-17 RX ADMIN — OXYCODONE HYDROCHLORIDE 10 MG: 10 TABLET ORAL at 13:58

## 2020-01-17 RX ADMIN — ENOXAPARIN SODIUM 40 MG: 40 INJECTION SUBCUTANEOUS at 09:21

## 2020-01-17 RX ADMIN — DOCUSATE SODIUM 100 MG: 100 CAPSULE, LIQUID FILLED ORAL at 09:19

## 2020-01-17 RX ADMIN — ACETAMINOPHEN 650 MG: 325 TABLET ORAL at 00:05

## 2020-01-17 RX ADMIN — OXYCODONE HYDROCHLORIDE 10 MG: 10 TABLET ORAL at 18:02

## 2020-01-17 RX ADMIN — ACETAMINOPHEN 650 MG: 325 TABLET ORAL at 18:02

## 2020-01-17 RX ADMIN — OXYCODONE HYDROCHLORIDE 10 MG: 10 TABLET ORAL at 04:27

## 2020-01-17 RX ADMIN — ACETAMINOPHEN 650 MG: 325 TABLET ORAL at 04:27

## 2020-01-17 RX ADMIN — LEVOTHYROXINE SODIUM 250 MCG: 125 TABLET ORAL at 06:34

## 2020-01-17 RX ADMIN — DOCUSATE SODIUM 100 MG: 100 CAPSULE, LIQUID FILLED ORAL at 17:51

## 2020-01-17 RX ADMIN — POLYETHYLENE GLYCOL 3350 17 G: 17 POWDER, FOR SOLUTION ORAL at 09:20

## 2020-01-17 RX ADMIN — ACETAMINOPHEN 650 MG: 325 TABLET ORAL at 09:53

## 2020-01-17 NOTE — PLAN OF CARE
Problem: PAIN - ADULT  Goal: Verbalizes/displays adequate comfort level or baseline comfort level  Description  Interventions:  - Encourage patient to monitor pain and request assistance  - Assess pain using appropriate pain scale  - Administer analgesics based on type and severity of pain and evaluate response  - Implement non-pharmacological measures as appropriate and evaluate response  - Consider cultural and social influences on pain and pain management  - Notify physician/advanced practitioner if interventions unsuccessful or patient reports new pain  1/17/2020 1829 by Alvin Chew RN  Outcome: Completed  1/17/2020 1028 by Alvin Chew RN  Outcome: Progressing  1/17/2020 0819 by Alvin Chew RN  Outcome: Progressing     Problem: INFECTION - ADULT  Goal: Absence or prevention of progression during hospitalization  Description  INTERVENTIONS:  - Assess and monitor for signs and symptoms of infection  - Monitor lab/diagnostic results  - Monitor all insertion sites, i e  indwelling lines, tubes, and drains  - Monitor endotracheal if appropriate and nasal secretions for changes in amount and color  - [place] appropriate cooling/warming therapies per order  - Administer medications as ordered  - Instruct and encourage patient and family to use good hand hygiene technique  - Identify and instruct in appropriate isolation precautions for identified infection/condition  1/17/2020 1829 by Alvin Chew RN  Outcome: Completed  1/17/2020 1028 by Alvin Chew RN  Outcome: Progressing  1/17/2020 0819 by Alvin Chew RN  Outcome: Progressing  Goal: Absence of fever/infection during neutropenic period  Description  INTERVENTIONS:  - Monitor WBC    1/17/2020 1829 by Alvin Chew RN  Outcome: Completed  1/17/2020 1028 by Alvin Chew RN  Outcome: Progressing  1/17/2020 0819 by Alvin Chew RN  Outcome: Progressing     Problem: SAFETY ADULT  Goal: Patient will remain free of falls  Description  INTERVENTIONS:  - Assess patient frequently for physical needs  -  Identify cognitive and physical deficits and behaviors that affect risk of falls    -  [place] fall precautions as indicated by assessment   - Educate patient/family on patient safety including physical limitations  - Instruct patient to call for assistance with activity based on assessment  - Modify environment to reduce risk of injury  - Consider OT/PT consult to assist with strengthening/mobility  1/17/2020 1829 by Beena Jenkins RN  Outcome: Completed  1/17/2020 1028 by Beena Jenkins RN  Outcome: Progressing  1/17/2020 0819 by Beena Jenkins RN  Outcome: Progressing  Goal: Maintain or return to baseline ADL function  Description  INTERVENTIONS:  -  Assess patient's ability to carry out ADLs; assess patient's baseline for ADL function and identify physical deficits which impact ability to perform ADLs (bathing, care of mouth/teeth, toileting, grooming, dressing, etc )  - Assess/evaluate cause of self-care deficits   - Assess range of motion  - Assess patient's mobility; develop plan if impaired  - Assess patient's need for assistive devices and provide as appropriate  - Encourage maximum independence but intervene and supervise when necessary  - Involve family in performance of ADLs  - Assess for home care needs following discharge   - Consider OT consult to assist with ADL evaluation and planning for discharge  - Provide patient education as appropriate  1/17/2020 1829 by Beena Jenkins RN  Outcome: Completed  1/17/2020 1028 by Beena Jenkins RN  Outcome: Progressing  1/17/2020 0819 by Beena Jenkins RN  Outcome: Progressing  Goal: Maintain or return mobility status to optimal level  Description  INTERVENTIONS:  - Assess patient's baseline mobility status (ambulation, transfers, stairs, etc )    - Identify cognitive and physical deficits and behaviors that affect mobility  - Identify mobility aids required to assist with transfers and/or ambulation (gait belt, sit-to-stand, lift, walker, cane, etc )  - [place] fall precautions as indicated by assessment  - Record patient progress and toleration of activity level on Mobility SBAR; progress patient to next Phase/Stage  - Instruct patient to call for assistance with activity based on assessment  - Consider rehabilitation consult to assist with strengthening/weightbearing, etc   1/17/2020 1829 by Claudeen Cone, RN  Outcome: Completed  1/17/2020 1028 by Claudeen Cone, RN  Outcome: Progressing  1/17/2020 0819 by Claudeen Cone, RN  Outcome: Progressing     Problem: Knowledge Deficit  Goal: Patient/family/caregiver demonstrates understanding of disease process, treatment plan, medications, and discharge instructions  Description  Complete learning assessment and assess knowledge base    Interventions:  - Provide teaching at level of understanding  - Provide teaching via preferred learning methods  1/17/2020 1829 by Claudeen Cone, RN  Outcome: Completed  1/17/2020 1028 by Claudeen Cone, RN  Outcome: Progressing  1/17/2020 0819 by Claudeen Cone, RN  Outcome: Progressing     Problem: DISCHARGE PLANNING  Goal: Discharge to home or other facility with appropriate resources  Description  INTERVENTIONS:  - Identify barriers to discharge w/patient and caregiver  - Arrange for needed discharge resources and transportation as appropriate  - Identify discharge learning needs (meds, wound care, etc )  - Arrange for interpretive services to assist at discharge as needed  - Refer to Case Management Department for coordinating discharge planning if the patient needs post-hospital services based on physician/advanced practitioner order or complex needs related to functional status, cognitive ability, or social support system  1/17/2020 1829 by Claudeen Cone, RN  Outcome: Completed  1/17/2020 1028 by Claudeen Cone, RN  Outcome: Progressing  1/17/2020 0819 by Claudeen Cone, RN  Outcome: Progressing     Problem: Potential for Falls  Goal: Patient will remain free of falls  Description  INTERVENTIONS:  - Assess patient frequently for physical needs  -  Identify cognitive and physical deficits and behaviors that affect risk of falls    -  [place] fall precautions as indicated by assessment   - Educate patient/family on patient safety including physical limitations  - Instruct patient to call for assistance with activity based on assessment  - Modify environment to reduce risk of injury  - Consider OT/PT consult to assist with strengthening/mobility  2020 by Mickey Merrill RN  Outcome: Completed  2020 102 by Mickey Merrill RN  Outcome: Progressing  2020 by Mickey Merrill RN  Outcome: Progressing     Problem: POSTPARTUM  Goal: Experiences normal postpartum course  Description  INTERVENTIONS:  - Monitor maternal vital signs  - Assess uterine involution and lochia  2020 by Mickey Merrill RN  Outcome: Completed  2020 102 by Mickey Merrill RN  Outcome: Progressing  2020 by Mickey Merrill RN  Outcome: Progressing  Goal: Appropriate maternal -  bonding  Description  INTERVENTIONS:  - Identify family support  - Assess for appropriate maternal/infant bonding   -Encourage maternal/infant bonding opportunities  - Referral to  or  as needed  2020 by Mickey Merrill RN  Outcome: Completed  2020 by Mickey Merrill RN  Outcome: Progressing  2020 by Mickey Merrill RN  Outcome: Progressing  Goal: Establishment of infant feeding pattern  Description  INTERVENTIONS:  - Assess breast/bottle feeding  - Refer to lactation as needed  2020 by Mickey Merrill RN  Outcome: Completed  2020 by Mickey Merrill RN  Outcome: Progressing  2020 by Mickey Merrill RN  Outcome: Progressing  Goal: Incision(s), wounds(s) or drain site(s) healing without S/S of infection  Description  INTERVENTIONS  - Assess and document risk factors for skin impairment   - Assess and document dressing, incision, wound bed, drain sites and surrounding tissue  - Consider nutrition services referral as needed  - Oral mucous membranes remain intact  - Provide patient/ family education  1/17/2020 1829 by Jessy Phillips RN  Outcome: Completed  1/17/2020 1028 by Jessy Phillips RN  Outcome: Progressing  1/17/2020 0819 by Jessy Phillips RN  Outcome: Progressing

## 2020-01-17 NOTE — PROGRESS NOTES
Progress Note - OB/GYN   Krystal Lewis 29 y o  female MRN: [id number]  Unit/Bed#:  315-01 Encounter: [id number]    Assessment:  Post partum Day #2 s/p RLTCS, stable, baby in nursery    Plan:  1) Anemia   -Hgb 9 2->7 9, s/p prophylactic TXA    2) Hashimoto's   -Synthroid 200mcg    3)DVT PPx   -Lovenox 40mg daily    4) Continue routine post partum care   Encourage ambulation   Encourage breastfeeding   Patient would like early discharge today     Subjective/Objective   Chief Complaint:     Post delivery  Patient is doing well  Lochia WNL  Pain well controlled  Subjective:     Pain: yes, cramping, improved with meds  Tolerating PO: yes  Voiding: yes  Flatus: yes  BM: no  Ambulating: yes  Breastfeeding:  yes  Chest pain: no  Shortness of breath: no  Leg pain: no  Lochia: minimal    Objective:     Vitals: /56 (BP Location: Right arm)   Pulse 69   Temp 98 2 °F (36 8 °C) (Oral)   Resp 18   Ht 5' 6" (1 676 m)   Wt 101 kg (222 lb)   LMP 04/23/2019   SpO2 98%   Breastfeeding? Yes   BMI 35 83 kg/m²       Intake/Output Summary (Last 24 hours) at 1/17/2020 0648  Last data filed at 1/16/2020 1230  Gross per 24 hour   Intake    Output 1600 ml   Net -1600 ml       Lab Results   Component Value Date    WBC 14 33 (H) 01/16/2020    HGB 7 9 (L) 01/16/2020    HCT 24 8 (L) 01/16/2020    MCV 86 01/16/2020     01/16/2020       Physical Exam:     Gen: AAOx3, NAD  CV: RRR  Lungs: CTA b/l  Abd: Soft, non-tender, non-distended, no rebound or guarding  Uterine fundus firm and non-tender, 1 cm below the umbilicus     Incision:C/D/I  Ext: Non tender    Edgardo Bhatt MD  1/17/2020  6:48 AM

## 2020-01-17 NOTE — PLAN OF CARE
Problem: PAIN - ADULT  Goal: Verbalizes/displays adequate comfort level or baseline comfort level  Description  Interventions:  - Encourage patient to monitor pain and request assistance  - Assess pain using appropriate pain scale  - Administer analgesics based on type and severity of pain and evaluate response  - Implement non-pharmacological measures as appropriate and evaluate response  - Consider cultural and social influences on pain and pain management  - Notify physician/advanced practitioner if interventions unsuccessful or patient reports new pain  1/17/2020 1028 by Edgar Rdz RN  Outcome: Progressing  1/17/2020 0819 by Edgar Rdz RN  Outcome: Progressing     Problem: INFECTION - ADULT  Goal: Absence or prevention of progression during hospitalization  Description  INTERVENTIONS:  - Assess and monitor for signs and symptoms of infection  - Monitor lab/diagnostic results  - Monitor all insertion sites, i e  indwelling lines, tubes, and drains  - Monitor endotracheal if appropriate and nasal secretions for changes in amount and color  - [place] appropriate cooling/warming therapies per order  - Administer medications as ordered  - Instruct and encourage patient and family to use good hand hygiene technique  - Identify and instruct in appropriate isolation precautions for identified infection/condition  1/17/2020 1028 by Edgar Rdz RN  Outcome: Progressing  1/17/2020 0819 by Edgar Rdz RN  Outcome: Progressing  Goal: Absence of fever/infection during neutropenic period  Description  INTERVENTIONS:  - Monitor WBC    1/17/2020 1028 by Edgar Rdz RN  Outcome: Progressing  1/17/2020 0819 by Edgar Rdz RN  Outcome: Progressing     Problem: SAFETY ADULT  Goal: Patient will remain free of falls  Description  INTERVENTIONS:  - Assess patient frequently for physical needs  -  Identify cognitive and physical deficits and behaviors that affect risk of falls    -  [place] fall precautions as indicated by assessment   - Educate patient/family on patient safety including physical limitations  - Instruct patient to call for assistance with activity based on assessment  - Modify environment to reduce risk of injury  - Consider OT/PT consult to assist with strengthening/mobility  1/17/2020 1028 by Mickey Merrill RN  Outcome: Progressing  1/17/2020 0819 by Mickey Merrill RN  Outcome: Progressing  Goal: Maintain or return to baseline ADL function  Description  INTERVENTIONS:  -  Assess patient's ability to carry out ADLs; assess patient's baseline for ADL function and identify physical deficits which impact ability to perform ADLs (bathing, care of mouth/teeth, toileting, grooming, dressing, etc )  - Assess/evaluate cause of self-care deficits   - Assess range of motion  - Assess patient's mobility; develop plan if impaired  - Assess patient's need for assistive devices and provide as appropriate  - Encourage maximum independence but intervene and supervise when necessary  - Involve family in performance of ADLs  - Assess for home care needs following discharge   - Consider OT consult to assist with ADL evaluation and planning for discharge  - Provide patient education as appropriate  1/17/2020 1028 by Mickey Merrill RN  Outcome: Progressing  1/17/2020 0819 by Mickey Merrill RN  Outcome: Progressing  Goal: Maintain or return mobility status to optimal level  Description  INTERVENTIONS:  - Assess patient's baseline mobility status (ambulation, transfers, stairs, etc )    - Identify cognitive and physical deficits and behaviors that affect mobility  - Identify mobility aids required to assist with transfers and/or ambulation (gait belt, sit-to-stand, lift, walker, cane, etc )  - [place] fall precautions as indicated by assessment  - Record patient progress and toleration of activity level on Mobility SBAR; progress patient to next Phase/Stage  - Instruct patient to call for assistance with activity based on assessment  - Consider rehabilitation consult to assist with strengthening/weightbearing, etc   1/17/2020 1028 by Taylor Barrientos RN  Outcome: Progressing  1/17/2020 0819 by Taylor Barrientos RN  Outcome: Progressing     Problem: Knowledge Deficit  Goal: Patient/family/caregiver demonstrates understanding of disease process, treatment plan, medications, and discharge instructions  Description  Complete learning assessment and assess knowledge base  Interventions:  - Provide teaching at level of understanding  - Provide teaching via preferred learning methods  1/17/2020 1028 by Taylor Barrientos RN  Outcome: Progressing  1/17/2020 0819 by Taylor Barrientos RN  Outcome: Progressing     Problem: DISCHARGE PLANNING  Goal: Discharge to home or other facility with appropriate resources  Description  INTERVENTIONS:  - Identify barriers to discharge w/patient and caregiver  - Arrange for needed discharge resources and transportation as appropriate  - Identify discharge learning needs (meds, wound care, etc )  - Arrange for interpretive services to assist at discharge as needed  - Refer to Case Management Department for coordinating discharge planning if the patient needs post-hospital services based on physician/advanced practitioner order or complex needs related to functional status, cognitive ability, or social support system  1/17/2020 1028 by Taylor Barrientos RN  Outcome: Progressing  1/17/2020 0819 by Taylor Barrientos RN  Outcome: Progressing     Problem: Potential for Falls  Goal: Patient will remain free of falls  Description  INTERVENTIONS:  - Assess patient frequently for physical needs  -  Identify cognitive and physical deficits and behaviors that affect risk of falls    -  [place] fall precautions as indicated by assessment   - Educate patient/family on patient safety including physical limitations  - Instruct patient to call for assistance with activity based on assessment  - Modify environment to reduce risk of injury  - Consider OT/PT consult to assist with strengthening/mobility  2020 1028 by Ludivina Ortiz RN  Outcome: Progressing  2020 08 by Ludivina Ortiz RN  Outcome: Progressing     Problem: POSTPARTUM  Goal: Experiences normal postpartum course  Description  INTERVENTIONS:  - Monitor maternal vital signs  - Assess uterine involution and lochia  2020 1028 by Ludivina Ortiz RN  Outcome: Progressing  2020 08 by Ludivina Ortiz RN  Outcome: Progressing  Goal: Appropriate maternal -  bonding  Description  INTERVENTIONS:  - Identify family support  - Assess for appropriate maternal/infant bonding   -Encourage maternal/infant bonding opportunities  - Referral to  or  as needed  2020 1028 by Ludivina Ortiz RN  Outcome: Progressing  2020 by Ludivina Ortiz RN  Outcome: Progressing  Goal: Establishment of infant feeding pattern  Description  INTERVENTIONS:  - Assess breast/bottle feeding  - Refer to lactation as needed  2020 1028 by Ludivina Ortiz RN  Outcome: Progressing  2020 by Ludivina Ortiz RN  Outcome: Progressing  Goal: Incision(s), wounds(s) or drain site(s) healing without S/S of infection  Description  INTERVENTIONS  - Assess and document risk factors for skin impairment   - Assess and document dressing, incision, wound bed, drain sites and surrounding tissue  - Consider nutrition services referral as needed  - Oral mucous membranes remain intact  - Provide patient/ family education  2020 1028 by Ludivina Ortiz RN  Outcome: Progressing  2020 by Ludivina Ortiz RN  Outcome: Progressing

## 2020-01-17 NOTE — PLAN OF CARE
Problem: PAIN - ADULT  Goal: Verbalizes/displays adequate comfort level or baseline comfort level  Description  Interventions:  - Encourage patient to monitor pain and request assistance  - Assess pain using appropriate pain scale  - Administer analgesics based on type and severity of pain and evaluate response  - Implement non-pharmacological measures as appropriate and evaluate response  - Consider cultural and social influences on pain and pain management  - Notify physician/advanced practitioner if interventions unsuccessful or patient reports new pain  Outcome: Progressing     Problem: INFECTION - ADULT  Goal: Absence or prevention of progression during hospitalization  Description  INTERVENTIONS:  - Assess and monitor for signs and symptoms of infection  - Monitor lab/diagnostic results  - Monitor all insertion sites, i e  indwelling lines, tubes, and drains  - Monitor endotracheal if appropriate and nasal secretions for changes in amount and color  - [place] appropriate cooling/warming therapies per order  - Administer medications as ordered  - Instruct and encourage patient and family to use good hand hygiene technique  - Identify and instruct in appropriate isolation precautions for identified infection/condition  Outcome: Progressing  Goal: Absence of fever/infection during neutropenic period  Description  INTERVENTIONS:  - Monitor WBC    Outcome: Progressing     Problem: SAFETY ADULT  Goal: Patient will remain free of falls  Description  INTERVENTIONS:  - Assess patient frequently for physical needs  -  Identify cognitive and physical deficits and behaviors that affect risk of falls    -  [place] fall precautions as indicated by assessment   - Educate patient/family on patient safety including physical limitations  - Instruct patient to call for assistance with activity based on assessment  - Modify environment to reduce risk of injury  - Consider OT/PT consult to assist with strengthening/mobility  Outcome: Progressing  Goal: Maintain or return to baseline ADL function  Description  INTERVENTIONS:  -  Assess patient's ability to carry out ADLs; assess patient's baseline for ADL function and identify physical deficits which impact ability to perform ADLs (bathing, care of mouth/teeth, toileting, grooming, dressing, etc )  - Assess/evaluate cause of self-care deficits   - Assess range of motion  - Assess patient's mobility; develop plan if impaired  - Assess patient's need for assistive devices and provide as appropriate  - Encourage maximum independence but intervene and supervise when necessary  - Involve family in performance of ADLs  - Assess for home care needs following discharge   - Consider OT consult to assist with ADL evaluation and planning for discharge  - Provide patient education as appropriate  Outcome: Progressing  Goal: Maintain or return mobility status to optimal level  Description  INTERVENTIONS:  - Assess patient's baseline mobility status (ambulation, transfers, stairs, etc )    - Identify cognitive and physical deficits and behaviors that affect mobility  - Identify mobility aids required to assist with transfers and/or ambulation (gait belt, sit-to-stand, lift, walker, cane, etc )  - [place] fall precautions as indicated by assessment  - Record patient progress and toleration of activity level on Mobility SBAR; progress patient to next Phase/Stage  - Instruct patient to call for assistance with activity based on assessment  - Consider rehabilitation consult to assist with strengthening/weightbearing, etc   Outcome: Progressing     Problem: Knowledge Deficit  Goal: Patient/family/caregiver demonstrates understanding of disease process, treatment plan, medications, and discharge instructions  Description  Complete learning assessment and assess knowledge base    Interventions:  - Provide teaching at level of understanding  - Provide teaching via preferred learning methods  Outcome: Progressing     Problem: DISCHARGE PLANNING  Goal: Discharge to home or other facility with appropriate resources  Description  INTERVENTIONS:  - Identify barriers to discharge w/patient and caregiver  - Arrange for needed discharge resources and transportation as appropriate  - Identify discharge learning needs (meds, wound care, etc )  - Arrange for interpretive services to assist at discharge as needed  - Refer to Case Management Department for coordinating discharge planning if the patient needs post-hospital services based on physician/advanced practitioner order or complex needs related to functional status, cognitive ability, or social support system  Outcome: Progressing     Problem: Potential for Falls  Goal: Patient will remain free of falls  Description  INTERVENTIONS:  - Assess patient frequently for physical needs  -  Identify cognitive and physical deficits and behaviors that affect risk of falls    -  [place] fall precautions as indicated by assessment   - Educate patient/family on patient safety including physical limitations  - Instruct patient to call for assistance with activity based on assessment  - Modify environment to reduce risk of injury  - Consider OT/PT consult to assist with strengthening/mobility  Outcome: Progressing     Problem: POSTPARTUM  Goal: Experiences normal postpartum course  Description  INTERVENTIONS:  - Monitor maternal vital signs  - Assess uterine involution and lochia  Outcome: Progressing  Goal: Appropriate maternal -  bonding  Description  INTERVENTIONS:  - Identify family support  - Assess for appropriate maternal/infant bonding   -Encourage maternal/infant bonding opportunities  - Referral to  or  as needed  Outcome: Progressing  Goal: Establishment of infant feeding pattern  Description  INTERVENTIONS:  - Assess breast/bottle feeding  - Refer to lactation as needed  Outcome: Progressing  Goal: Incision(s), wounds(s) or drain site(s) healing without S/S of infection  Description  INTERVENTIONS  - Assess and document risk factors for skin impairment   - Assess and document dressing, incision, wound bed, drain sites and surrounding tissue  - Consider nutrition services referral as needed  - Oral mucous membranes remain intact  - Provide patient/ family education  Outcome: Progressing

## 2020-01-17 NOTE — PLAN OF CARE
Problem: PAIN - ADULT  Goal: Verbalizes/displays adequate comfort level or baseline comfort level  Description  Interventions:  - Encourage patient to monitor pain and request assistance  - Assess pain using appropriate pain scale  - Administer analgesics based on type and severity of pain and evaluate response  - Implement non-pharmacological measures as appropriate and evaluate response  - Consider cultural and social influences on pain and pain management  - Notify physician/advanced practitioner if interventions unsuccessful or patient reports new pain  Outcome: Progressing     Problem: INFECTION - ADULT  Goal: Absence or prevention of progression during hospitalization  Description  INTERVENTIONS:  - Assess and monitor for signs and symptoms of infection  - Monitor lab/diagnostic results  - Monitor all insertion sites, i e  indwelling lines, tubes, and drains  - Monitor endotracheal if appropriate and nasal secretions for changes in amount and color  - [place] appropriate cooling/warming therapies per order  - Administer medications as ordered  - Instruct and encourage patient and family to use good hand hygiene technique  - Identify and instruct in appropriate isolation precautions for identified infection/condition  Outcome: Progressing  Goal: Absence of fever/infection during neutropenic period  Description  INTERVENTIONS:  - Monitor WBC    Outcome: Progressing     Problem: SAFETY ADULT  Goal: Patient will remain free of falls  Description  INTERVENTIONS:  - Assess patient frequently for physical needs  -  Identify cognitive and physical deficits and behaviors that affect risk of falls    -  [place] fall precautions as indicated by assessment   - Educate patient/family on patient safety including physical limitations  - Instruct patient to call for assistance with activity based on assessment  - Modify environment to reduce risk of injury  - Consider OT/PT consult to assist with strengthening/mobility  Outcome: Progressing  Goal: Maintain or return to baseline ADL function  Description  INTERVENTIONS:  -  Assess patient's ability to carry out ADLs; assess patient's baseline for ADL function and identify physical deficits which impact ability to perform ADLs (bathing, care of mouth/teeth, toileting, grooming, dressing, etc )  - Assess/evaluate cause of self-care deficits   - Assess range of motion  - Assess patient's mobility; develop plan if impaired  - Assess patient's need for assistive devices and provide as appropriate  - Encourage maximum independence but intervene and supervise when necessary  - Involve family in performance of ADLs  - Assess for home care needs following discharge   - Consider OT consult to assist with ADL evaluation and planning for discharge  - Provide patient education as appropriate  Outcome: Progressing  Goal: Maintain or return mobility status to optimal level  Description  INTERVENTIONS:  - Assess patient's baseline mobility status (ambulation, transfers, stairs, etc )    - Identify cognitive and physical deficits and behaviors that affect mobility  - Identify mobility aids required to assist with transfers and/or ambulation (gait belt, sit-to-stand, lift, walker, cane, etc )  - [place] fall precautions as indicated by assessment  - Record patient progress and toleration of activity level on Mobility SBAR; progress patient to next Phase/Stage  - Instruct patient to call for assistance with activity based on assessment  - Consider rehabilitation consult to assist with strengthening/weightbearing, etc   Outcome: Progressing     Problem: Knowledge Deficit  Goal: Patient/family/caregiver demonstrates understanding of disease process, treatment plan, medications, and discharge instructions  Description  Complete learning assessment and assess knowledge base    Interventions:  - Provide teaching at level of understanding  - Provide teaching via preferred learning methods  Outcome: Progressing     Problem: DISCHARGE PLANNING  Goal: Discharge to home or other facility with appropriate resources  Description  INTERVENTIONS:  - Identify barriers to discharge w/patient and caregiver  - Arrange for needed discharge resources and transportation as appropriate  - Identify discharge learning needs (meds, wound care, etc )  - Arrange for interpretive services to assist at discharge as needed  - Refer to Case Management Department for coordinating discharge planning if the patient needs post-hospital services based on physician/advanced practitioner order or complex needs related to functional status, cognitive ability, or social support system  Outcome: Progressing     Problem: Potential for Falls  Goal: Patient will remain free of falls  Description  INTERVENTIONS:  - Assess patient frequently for physical needs  -  Identify cognitive and physical deficits and behaviors that affect risk of falls    -  [place] fall precautions as indicated by assessment   - Educate patient/family on patient safety including physical limitations  - Instruct patient to call for assistance with activity based on assessment  - Modify environment to reduce risk of injury  - Consider OT/PT consult to assist with strengthening/mobility  Outcome: Progressing     Problem: POSTPARTUM  Goal: Experiences normal postpartum course  Description  INTERVENTIONS:  - Monitor maternal vital signs  - Assess uterine involution and lochia  Outcome: Progressing  Goal: Appropriate maternal -  bonding  Description  INTERVENTIONS:  - Identify family support  - Assess for appropriate maternal/infant bonding   -Encourage maternal/infant bonding opportunities  - Referral to  or  as needed  Outcome: Progressing  Goal: Establishment of infant feeding pattern  Description  INTERVENTIONS:  - Assess breast/bottle feeding  - Refer to lactation as needed  Outcome: Progressing  Goal: Incision(s), wounds(s) or drain site(s) healing without S/S of infection  Description  INTERVENTIONS  - Assess and document risk factors for skin impairment   - Assess and document dressing, incision, wound bed, drain sites and surrounding tissue  - Consider nutrition services referral as needed  - Oral mucous membranes remain intact  - Provide patient/ family education  Outcome: Progressing

## 2020-01-18 LAB
ABO GROUP BLD BPU: NORMAL
ABO GROUP BLD BPU: NORMAL
BPU ID: NORMAL
BPU ID: NORMAL
CROSSMATCH: NORMAL
CROSSMATCH: NORMAL
UNIT DISPENSE STATUS: NORMAL
UNIT DISPENSE STATUS: NORMAL
UNIT PRODUCT CODE: NORMAL
UNIT PRODUCT CODE: NORMAL
UNIT RH: NORMAL
UNIT RH: NORMAL

## 2020-01-20 NOTE — UTILIZATION REVIEW
Notification of Maternity/Delivery & P[facility]ott Birth Information for Admission - pending reference # [id number]   Notification of Maternity/Delivery for Admission to our facility 5 [address]  Be advised that this patient was admitted to our facility under Inpatient Status  Contact Teto Owens at [phone] for additional admission information  [facility] PARENT/CHILD HEALTH UR DEPT DEDICATED [facility] [phone]  Mother & Piggott Information   Patient Name: Hollie Mora   YOB: 1985   Delivering clinician:     OB History        7    Para   3    Term   3            AB   4    Living   3       SAB   2    TAB        Ectopic   2    Multiple   0    Live Births   1               Piggott Name & MRN:   Information for the patient's :  Luisa Dsouza Girl Arshruthi [place]) [[id number]]      Delivery Information:  Sex: female  Delivered 1/15/2020 2:43 PM by , Low Transverse; Gestational Age: 43w4d     Measurements:  Weight: 9 lb 12 3 oz (4430 g); Height: 21"    APGAR 1 minute 5 minutes 10 minutes   Totals: 9 9       Birth Information: 29 y o  female MRN: [id number] Unit/Bed#: -01 Estimated Date of Delivery: 20  Birthweight: No birth weight on file  Gestational Age: <None> Delivery Type: , Low Transverse          APGARS  One minute Five minutes Ten minutes   Totals:                 [address]   [address] [place] [address]:   [place] [address]  Tax ID: [id number]  NPI: [id number] Attending Provider/NPI: Kassandra Kc Md [id number] Physician:  RYAN Moore    Specialty- Obstetrics and Gynecology  [facility] ID- [id number]  [address] [place] [address], [address] [place] [address]  Phone 1: [phone]  Fax: [phone]   Place of Service Code: 24     Place of Service Name:  [address] [place] [address]   Start Date: 1/15/20 [facility]     Discharge Date & Time: 2020  6:28 PM    Type of Admission: Inpatient Status Discharge Disposition (if discharged): Home/Self Care   Patient Diagnoses:   Previous  section complicating pregnancy [L77 075]  The primary encounter diagnosis was Pregnancy with 38 completed weeks gestation  Diagnoses of Previous  section complicating pregnancy and Status post repeat low transverse  section were also pertinent to this visit  1  Pregnancy with 38 completed weeks gestation    2  Previous  section complicating pregnancy    3  Status post repeat low transverse  section       Orders: Admission Orders (From admission, onward)     Ordered        01/15/20 1145  Inpatient Admission  Once                    Assigned Utilization Review Contact: Hemant Mcleod  Utilization   Network Utilization Review Department  Phone: [phone]; Fax [phone]  Email: [email or web address]

## 2020-01-21 ENCOUNTER — ROUTINE PRENATAL (OUTPATIENT)
Dept: OBGYN CLINIC | Facility: CLINIC | Age: 35
End: 2020-01-21

## 2020-01-21 VITALS — WEIGHT: 206 LBS | BODY MASS INDEX: 33.25 KG/M2 | SYSTOLIC BLOOD PRESSURE: 120 MMHG | DIASTOLIC BLOOD PRESSURE: 80 MMHG

## 2020-01-21 DIAGNOSIS — Z98.891 STATUS POST REPEAT LOW TRANSVERSE CESAREAN SECTION: Primary | ICD-10-CM

## 2020-01-21 PROCEDURE — 99024 POSTOP FOLLOW-UP VISIT: CPT | Performed by: NURSE PRACTITIONER

## 2020-01-21 NOTE — PROGRESS NOTES
Vanda Quezada is a 29 y o  female who presents to the clinic 1 weeks status post repeat    Eating a regular diet without difficulty  Bowel movements are normal  Pain is controlled with current analgesics  Medications being used: acetaminophen and ibuprofen (OTC)  Pt is currently breastfeeding without difficulty  Has had some clogged milk ducts but able to use warm compresses and massage to relieve  Baby is doing well  Natan feels well  Complains of some pressure and feeling "bumps" in vulvar area, first noticed week prior to delivery  Bleeding is minimal with occasional gushes when feeding   Incision clean, dry, intact  Denies any drainage or odors at incision site  Denies any fevers or chills  Feels much better than she did with her previous   Great relief from discomfort of pregnancy  The following portions of the patient's history were reviewed and updated as appropriate: allergies, current medications, past family history, past medical history, past social history, past surgical history and problem list     Review of Systems  Pertinent items are noted in HPI  Objective     /80   Wt 93 4 kg (206 lb)   LMP 2019   BMI 33 25 kg/m²   General:  alert and oriented, in no acute distress   Abdomen: soft, bowel sounds active, non-tender   Incision:   healing well, no drainage, no erythema, no hernia, no seroma, no swelling, no dehiscence, incision well approximated     Vaginal: Vulvar varicosities noted on labia majora  Assessment      Doing well postoperatively  Plan     1  Continue any current medications  Alternate tylenol and motrin as needed for pain  Discontinue narcotic use  2  Wound care discussed  3  Reviewed lumps and pressure in vulvar area due to vulvar varicosities  Reviewed thick pad and tight fitting underwear to help relive pressure  3  Activity restrictions: continue pelvic rest  Can increase activity as tolerated   May drive when able to slam on brake without hesitation, no driving while takig narcotics  4  Follow up: 2 weeks for postpartum appointment

## 2020-01-24 DIAGNOSIS — Z98.891 STATUS POST REPEAT LOW TRANSVERSE CESAREAN SECTION: ICD-10-CM

## 2020-01-24 RX ORDER — OXYCODONE HYDROCHLORIDE 5 MG/1
5 TABLET ORAL EVERY 4 HOURS PRN
Qty: 10 TABLET | Refills: 0 | Status: SHIPPED | OUTPATIENT
Start: 2020-01-24 | End: 2020-01-31 | Stop reason: HOSPADM

## 2020-01-24 NOTE — TELEPHONE ENCOUNTER
T/c from patient requesting a refill of pain medication , states recent  and it has become very painful , denies any fever, no drainage from from incision   states she ran out of the pain meds yesterday and has been alternating with Tylenol and motrin with no relief    MM CMA

## 2020-01-29 ENCOUNTER — TELEPHONE (OUTPATIENT)
Dept: OBGYN CLINIC | Facility: CLINIC | Age: 35
End: 2020-01-29

## 2020-01-29 ENCOUNTER — HOSPITAL ENCOUNTER (INPATIENT)
Facility: HOSPITAL | Age: 35
LOS: 2 days | Discharge: HOME | DRG: 561 | End: 2020-01-31
Attending: EMERGENCY MEDICINE | Admitting: OBSTETRICS & GYNECOLOGY
Payer: COMMERCIAL

## 2020-01-29 DIAGNOSIS — Z98.891 STATUS POST REPEAT LOW TRANSVERSE CESAREAN SECTION: ICD-10-CM

## 2020-01-29 DIAGNOSIS — N93.9 VAGINAL BLEEDING: ICD-10-CM

## 2020-01-29 DIAGNOSIS — R10.32 LEFT LOWER QUADRANT ABDOMINAL PAIN: ICD-10-CM

## 2020-01-29 LAB
ALBUMIN SERPL BCP-MCNC: 3.9 G/DL (ref 3.5–5)
ALP SERPL-CCNC: 95 U/L (ref 46–116)
ALT SERPL W P-5'-P-CCNC: 15 U/L (ref 12–78)
ANION GAP SERPL CALCULATED.3IONS-SCNC: 11 MMOL/L (ref 4–13)
APTT PPP: 33 SECONDS (ref 23–37)
AST SERPL W P-5'-P-CCNC: 17 U/L (ref 5–45)
BACTERIA UR QL AUTO: ABNORMAL /HPF
BASOPHILS # BLD AUTO: 0.07 THOUSANDS/ΜL (ref 0–0.1)
BASOPHILS NFR BLD AUTO: 1 % (ref 0–1)
BILIRUB SERPL-MCNC: 0.3 MG/DL (ref 0.2–1)
BILIRUB UR QL STRIP: NEGATIVE
BUN SERPL-MCNC: 10 MG/DL (ref 5–25)
CALCIUM SERPL-MCNC: 9.1 MG/DL (ref 8.3–10.1)
CHLORIDE SERPL-SCNC: 106 MMOL/L (ref 100–108)
CLARITY UR: CLEAR
CO2 SERPL-SCNC: 28 MMOL/L (ref 21–32)
COLOR UR: YELLOW
CREAT SERPL-MCNC: 0.77 MG/DL (ref 0.6–1.3)
CREAT UR-MCNC: 190 MG/DL
EOSINOPHIL # BLD AUTO: 0.39 THOUSAND/ΜL (ref 0–0.61)
EOSINOPHIL NFR BLD AUTO: 5 % (ref 0–6)
ERYTHROCYTE [DISTWIDTH] IN BLOOD BY AUTOMATED COUNT: 15.7 % (ref 11.6–15.1)
GFR SERPL CREATININE-BSD FRML MDRD: 101 ML/MIN/1.73SQ M
GLUCOSE SERPL-MCNC: 93 MG/DL (ref 65–140)
GLUCOSE UR STRIP-MCNC: NEGATIVE MG/DL
HCT VFR BLD AUTO: 40.9 % (ref 34.8–46.1)
HGB BLD-MCNC: 12.8 G/DL (ref 11.5–15.4)
HGB UR QL STRIP.AUTO: ABNORMAL
IMM GRANULOCYTES # BLD AUTO: 0.03 THOUSAND/UL (ref 0–0.2)
IMM GRANULOCYTES NFR BLD AUTO: 0 % (ref 0–2)
INR PPP: 0.95 (ref 0.84–1.19)
KETONES UR STRIP-MCNC: NEGATIVE MG/DL
LEUKOCYTE ESTERASE UR QL STRIP: NEGATIVE
LYMPHOCYTES # BLD AUTO: 1.75 THOUSANDS/ΜL (ref 0.6–4.47)
LYMPHOCYTES NFR BLD AUTO: 23 % (ref 14–44)
MCH RBC QN AUTO: 27 PG (ref 26.8–34.3)
MCHC RBC AUTO-ENTMCNC: 31.3 G/DL (ref 31.4–37.4)
MCV RBC AUTO: 86 FL (ref 82–98)
MONOCYTES # BLD AUTO: 0.47 THOUSAND/ΜL (ref 0.17–1.22)
MONOCYTES NFR BLD AUTO: 6 % (ref 4–12)
MUCOUS THREADS UR QL AUTO: ABNORMAL
NEUTROPHILS # BLD AUTO: 5.01 THOUSANDS/ΜL (ref 1.85–7.62)
NEUTS SEG NFR BLD AUTO: 65 % (ref 43–75)
NITRITE UR QL STRIP: NEGATIVE
NON-SQ EPI CELLS URNS QL MICRO: ABNORMAL /HPF
NRBC BLD AUTO-RTO: 0 /100 WBCS
PH UR STRIP.AUTO: 6.5 [PH]
PLATELET # BLD AUTO: 388 THOUSANDS/UL (ref 149–390)
PMV BLD AUTO: 9.8 FL (ref 8.9–12.7)
POTASSIUM SERPL-SCNC: 3.9 MMOL/L (ref 3.5–5.3)
PROT SERPL-MCNC: 7.9 G/DL (ref 6.4–8.2)
PROT UR STRIP-MCNC: ABNORMAL MG/DL
PROT UR-MCNC: 43 MG/DL
PROT/CREAT UR: 0.23 MG/G{CREAT} (ref 0–0.1)
PROTHROMBIN TIME: 12.1 SECONDS (ref 11.6–14.5)
RBC # BLD AUTO: 4.74 MILLION/UL (ref 3.81–5.12)
RBC #/AREA URNS AUTO: ABNORMAL /HPF
SODIUM SERPL-SCNC: 145 MMOL/L (ref 136–145)
SP GR UR STRIP.AUTO: 1.02 (ref 1–1.03)
UROBILINOGEN UR QL STRIP.AUTO: 1 E.U./DL
WBC # BLD AUTO: 7.72 THOUSAND/UL (ref 4.31–10.16)
WBC #/AREA URNS AUTO: ABNORMAL /HPF

## 2020-01-29 PROCEDURE — 85730 THROMBOPLASTIN TIME PARTIAL: CPT | Performed by: EMERGENCY MEDICINE

## 2020-01-29 PROCEDURE — 81001 URINALYSIS AUTO W/SCOPE: CPT | Performed by: EMERGENCY MEDICINE

## 2020-01-29 PROCEDURE — 80053 COMPREHEN METABOLIC PANEL: CPT | Performed by: EMERGENCY MEDICINE

## 2020-01-29 PROCEDURE — 82570 ASSAY OF URINE CREATININE: CPT | Performed by: EMERGENCY MEDICINE

## 2020-01-29 PROCEDURE — 99222 1ST HOSP IP/OBS MODERATE 55: CPT | Performed by: OBSTETRICS & GYNECOLOGY

## 2020-01-29 PROCEDURE — 85610 PROTHROMBIN TIME: CPT | Performed by: EMERGENCY MEDICINE

## 2020-01-29 PROCEDURE — 36415 COLL VENOUS BLD VENIPUNCTURE: CPT | Performed by: EMERGENCY MEDICINE

## 2020-01-29 PROCEDURE — 99285 EMERGENCY DEPT VISIT HI MDM: CPT | Performed by: EMERGENCY MEDICINE

## 2020-01-29 PROCEDURE — 85025 COMPLETE CBC W/AUTO DIFF WBC: CPT | Performed by: EMERGENCY MEDICINE

## 2020-01-29 PROCEDURE — 84156 ASSAY OF PROTEIN URINE: CPT | Performed by: EMERGENCY MEDICINE

## 2020-01-29 PROCEDURE — 99285 EMERGENCY DEPT VISIT HI MDM: CPT

## 2020-01-29 RX ORDER — KETOROLAC TROMETHAMINE 30 MG/ML
30 INJECTION, SOLUTION INTRAMUSCULAR; INTRAVENOUS ONCE
Status: COMPLETED | OUTPATIENT
Start: 2020-01-29 | End: 2020-01-29

## 2020-01-29 RX ORDER — MAGNESIUM SULFATE HEPTAHYDRATE 40 MG/ML
4 INJECTION, SOLUTION INTRAVENOUS ONCE
Status: COMPLETED | OUTPATIENT
Start: 2020-01-29 | End: 2020-01-29

## 2020-01-29 RX ORDER — LEVOTHYROXINE SODIUM 0.12 MG/1
250 TABLET ORAL DAILY
Status: DISCONTINUED | OUTPATIENT
Start: 2020-01-30 | End: 2020-01-31 | Stop reason: HOSPADM

## 2020-01-29 RX ORDER — ACETAMINOPHEN 325 MG/1
650 TABLET ORAL EVERY 6 HOURS PRN
Status: DISCONTINUED | OUTPATIENT
Start: 2020-01-29 | End: 2020-01-30

## 2020-01-29 RX ORDER — LABETALOL 20 MG/4 ML (5 MG/ML) INTRAVENOUS SYRINGE
20 ONCE
Status: COMPLETED | OUTPATIENT
Start: 2020-01-29 | End: 2020-01-29

## 2020-01-29 RX ORDER — SODIUM CHLORIDE, SODIUM LACTATE, POTASSIUM CHLORIDE, CALCIUM CHLORIDE 600; 310; 30; 20 MG/100ML; MG/100ML; MG/100ML; MG/100ML
25 INJECTION, SOLUTION INTRAVENOUS CONTINUOUS
Status: DISCONTINUED | OUTPATIENT
Start: 2020-01-29 | End: 2020-01-31 | Stop reason: HOSPADM

## 2020-01-29 RX ORDER — LABETALOL 20 MG/4 ML (5 MG/ML) INTRAVENOUS SYRINGE
10 ONCE
Status: DISCONTINUED | OUTPATIENT
Start: 2020-01-29 | End: 2020-01-29

## 2020-01-29 RX ORDER — IBUPROFEN 600 MG/1
600 TABLET ORAL EVERY 6 HOURS PRN
Status: DISCONTINUED | OUTPATIENT
Start: 2020-01-30 | End: 2020-01-31 | Stop reason: HOSPADM

## 2020-01-29 RX ORDER — MAGNESIUM SULFATE HEPTAHYDRATE 40 MG/ML
2 INJECTION, SOLUTION INTRAVENOUS CONTINUOUS
Status: DISCONTINUED | OUTPATIENT
Start: 2020-01-29 | End: 2020-01-30

## 2020-01-29 RX ORDER — LABETALOL 20 MG/4 ML (5 MG/ML) INTRAVENOUS SYRINGE
40 ONCE
Status: DISCONTINUED | OUTPATIENT
Start: 2020-01-29 | End: 2020-01-29

## 2020-01-29 RX ORDER — MAGNESIUM SULFATE HEPTAHYDRATE 40 MG/ML
2 INJECTION, SOLUTION INTRAVENOUS ONCE
Status: COMPLETED | OUTPATIENT
Start: 2020-01-29 | End: 2020-01-29

## 2020-01-29 RX ADMIN — MAGNESIUM SULFATE IN WATER 2 G: 40 INJECTION, SOLUTION INTRAVENOUS at 21:55

## 2020-01-29 RX ADMIN — LABETALOL 20 MG/4 ML (5 MG/ML) INTRAVENOUS SYRINGE 20 MG: at 18:47

## 2020-01-29 RX ADMIN — SODIUM CHLORIDE, SODIUM LACTATE, POTASSIUM CHLORIDE, AND CALCIUM CHLORIDE 25 ML/HR: .6; .31; .03; .02 INJECTION, SOLUTION INTRAVENOUS at 21:00

## 2020-01-29 RX ADMIN — MAGNESIUM SULFATE IN WATER 4 G: 40 INJECTION, SOLUTION INTRAVENOUS at 21:34

## 2020-01-29 RX ADMIN — Medication 2 G/HR: at 22:14

## 2020-01-29 RX ADMIN — KETOROLAC TROMETHAMINE 30 MG: 30 INJECTION, SOLUTION INTRAMUSCULAR at 21:15

## 2020-01-29 NOTE — ED PROVIDER NOTES
History  Chief Complaint   Patient presents with    Vaginal Bleeding     Pt post C section from 1/15  Pt reports being in "Severe pain suddenly" 5 days ago that has worsened and "moved the left side " Pt states "I feel like my sutures opened, but I look at them and their arent" Pt reports tender to touch  Pt reports vaginal bleeding started 3 days ago and is "pretty bad, 2 pads in an hour"     Postpartum Complications     Patient is a 69-year-old female who presents with vaginal bleeding  Patient had a  section performed on 01/15/2020  She had anemia postoperatively but did not require transfusion  She states that she was doing well upon discharge but over the past 5 days has had increasing left sided abdominal pain and vaginal bleeding  Today, her vaginal bleeding has increased  She has gone through about 6 pads so far today and two within the last 2 hours  She called her OB who referred her to the emergency department  Patient denies headache, visual changes, nausea, vomiting or other complaints  History provided by:  Patient  Vaginal Bleeding   Quality:  Bright red  Duration:  1 day  Timing:  Constant  Progression:  Worsening  Chronicity:  New  Number of pads used:  6 today  Possible pregnancy: no    Ineffective treatments:  Ibuprofen and acetaminophen  Associated symptoms: abdominal pain and fatigue    Associated symptoms: no back pain, no dizziness, no dysuria, no fever, no nausea and no vaginal discharge        Prior to Admission Medications   Prescriptions Last Dose Informant Patient Reported? Taking?    Prenatal Vit-Iron Carbonyl-FA (PRENATAL MULTIVITAMIN) TABS Past Week at Unknown time Self Yes Yes   Sig: Take 1 tablet by mouth daily   SYNTHROID 200 MCG tablet 2020 at Unknown time Self Yes Yes   Sig: Take 300 mcg by mouth daily    acetaminophen (TYLENOL) 325 mg tablet 2020 at 1800  No Yes   Sig: Take 2 tablets (650 mg total) by mouth every 6 (six) hours as needed for headaches docusate sodium (COLACE) 100 mg capsule 2020 at Unknown time  No Yes   Sig: Take 1 capsule (100 mg total) by mouth 2 (two) times a day   ibuprofen (MOTRIN) 600 mg tablet 2020 at 1800  No Yes   Sig: Take 1 tablet (600 mg total) by mouth every 6 (six) hours as needed for mild pain (cramping)   oxyCODONE (ROXICODONE) 5 mg immediate release tablet 2020 at Unknown time  No Yes   Sig: Take 1 tablet (5 mg total) by mouth every 4 (four) hours as needed for moderate pain for up to 10 daysMax Daily Amount: 30 mg      Facility-Administered Medications: None       Past Medical History:   Diagnosis Date    Anxiety     Chronic pain     left hip    Ectopic pregnancy with intrauterine pregnancy      and     Hashimoto's disease     Hashimoto's thyroiditis     IBS (irritable bowel syndrome)     Pituitary tumor     Polycystic ovarian syndrome     Varicella        Past Surgical History:   Procedure Laterality Date     SECTION      DERMOID CYST  EXCISION      ECTOPIC PREGNANCY SURGERY      RI  DELIVERY ONLY N/A 1/15/2020    Procedure:  SECTION () REPEAT;  Surgeon: Ken Perez MD;  Location: [facility] ;  Service: Obstetrics       Family History   Problem Relation Age of Onset    Diabetes Brother     Heart disease Maternal Grandmother      I have reviewed and agree with the history as documented  Social History     Tobacco Use    Smoking status: Former Smoker     Packs/day: 0 50     Types: Cigarettes    Smokeless tobacco: Former User     Quit date: 2019   Substance Use Topics    Alcohol use: Not Currently    Drug use: Yes     Types: Other     Comment: was on methadone for pain management over 1 year ago        Review of Systems   Constitutional: Positive for fatigue  Negative for chills, diaphoresis and fever  HENT: Negative for nosebleeds, sore throat and trouble swallowing  Eyes: Negative for photophobia, pain and visual disturbance     Respiratory: Negative for cough, chest tightness and shortness of breath  Cardiovascular: Negative for chest pain, palpitations and leg swelling  Gastrointestinal: Positive for abdominal pain  Negative for constipation, diarrhea, nausea and vomiting  Endocrine: Negative for polydipsia and polyuria  Genitourinary: Positive for vaginal bleeding  Negative for difficulty urinating, dysuria, hematuria, pelvic pain and vaginal discharge  Musculoskeletal: Negative for back pain, neck pain and neck stiffness  Skin: Negative for pallor and rash  Neurological: Negative for dizziness, seizures, light-headedness and headaches  All other systems reviewed and are negative  Physical Exam  Physical Exam   Constitutional: She is oriented to person, place, and time  She appears well-developed and well-nourished  No distress  HENT:   Head: Normocephalic and atraumatic  Mouth/Throat: Oropharynx is clear and moist and mucous membranes are normal    Eyes: Pupils are equal, round, and reactive to light  EOM are normal    Neck: Normal range of motion  Neck supple  Cardiovascular: Normal rate, regular rhythm, normal heart sounds, intact distal pulses and normal pulses  Pulmonary/Chest: Effort normal and breath sounds normal  No respiratory distress  Abdominal: Soft  She exhibits no distension  There is tenderness in the left upper quadrant and left lower quadrant  There is no rigidity, no rebound and no guarding  Genitourinary:   Genitourinary Comments: Dark, bloody discharge from cervical os  Musculoskeletal: Normal range of motion  She exhibits no edema or tenderness  Lymphadenopathy:     She has no cervical adenopathy  Neurological: She is alert and oriented to person, place, and time  She has normal strength  No cranial nerve deficit or sensory deficit  Skin: Skin is warm and dry  Capillary refill takes less than 2 seconds  Psychiatric: She has a normal mood and affect     Nursing note and vitals reviewed        Vital Signs  ED Triage Vitals   Temperature Pulse Respirations Blood Pressure SpO2   01/29/20 1745 01/29/20 1745 01/29/20 1745 01/29/20 1756 01/29/20 1745   98 1 °F (36 7 °C) 80 18 (!) 173/97 98 %      Temp Source Heart Rate Source Patient Position - Orthostatic VS BP Location FiO2 (%)   01/29/20 1745 01/29/20 1745 01/29/20 1745 01/29/20 1745 --   Oral Monitor Sitting Left arm       Pain Score       01/29/20 1745       7           Vitals:    01/30/20 2014 01/31/20 0017 01/31/20 0400 01/31/20 0752   BP: 143/75 136/68 120/64 128/76   Pulse: 69 62  (!) 53   Patient Position - Orthostatic VS: Sitting  Lying          Visual Acuity      ED Medications  Medications   lactated ringers infusion (0 mL/hr Intravenous Stopped 1/30/20 2130)   levothyroxine tablet 250 mcg (250 mcg Oral Not Given 1/31/20 1130)   ibuprofen (MOTRIN) tablet 600 mg (600 mg Oral Given 1/31/20 0836)   oxyCODONE (ROXICODONE) IR tablet 5 mg (5 mg Oral Given 1/31/20 0836)   acetaminophen (TYLENOL) tablet 650 mg (650 mg Oral Given 1/31/20 0759)   docusate sodium (COLACE) capsule 100 mg (100 mg Oral Given 1/31/20 1130)   Labetalol HCl (NORMODYNE) injection 20 mg (20 mg Intravenous Given 1/29/20 1847)   magnesium sulfate 4 g/100 mL IVPB (premix) 4 g (0 g Intravenous Stopped 1/29/20 2154)     Followed by   magnesium sulfate 2 g/50 mL IVPB (premix) 2 g (0 g Intravenous Stopped 1/29/20 2213)   ketorolac (TORADOL) injection 30 mg (30 mg Intravenous Given 1/29/20 2115)       Diagnostic Studies  Results Reviewed     Procedure Component Value Units Date/Time    Urine Microscopic [[id number]]  (Abnormal) Collected:  01/29/20 1845    Lab Status:  Final result Specimen:  Urine, Clean Catch Updated:  01/29/20 1938     RBC, UA 0-1 /hpf      WBC, UA 4-10 /hpf      Epithelial Cells Occasional /hpf      Bacteria, UA Occasional /hpf      MUCUS THREADS Moderate    Protein / creatinine ratio, urine [[id number]]  (Abnormal) Collected:  01/29/20 3841    Lab Status: Final result Specimen:  Urine, Clean Catch Updated:  01/29/20 1914     Creatinine, Ur 190 0 mg/dL      Protein Urine Random 43 mg/dL      Prot/Creat Ratio, Ur 0 23    Protime-INR [[id number]]  (Normal) Collected:  01/29/20 1821    Lab Status:  Final result Specimen:  Blood from Arm, Right Updated:  01/29/20 1907     Protime 12 1 seconds      INR 0 95    APTT [[id number]]  (Normal) Collected:  01/29/20 1821    Lab Status:  Final result Specimen:  Blood from Arm, Right Updated:  01/29/20 1907     PTT 33 seconds     UA (URINE) with reflex to Scope [[id number]]  (Abnormal) Collected:  01/29/20 1845    Lab Status:  Final result Specimen:  Urine, Clean Catch Updated:  01/29/20 1906     Color, UA Yellow     Clarity, UA Clear     Specific [place], UA 1 020     pH, UA 6 5     Leukocytes, UA Negative     Nitrite, UA Negative     Protein, UA Trace mg/dl      Glucose, UA Negative mg/dl      Ketones, UA Negative mg/dl      Urobilinogen, UA 1 0 E U /dl      Bilirubin, UA Negative     Blood, UA Moderate    Comprehensive metabolic panel [[id number]] Collected:  01/29/20 1812    Lab Status:  Final result Specimen:  Blood from Arm, Right Updated:  01/29/20 1845     Sodium 145 mmol/L      Potassium 3 9 mmol/L      Chloride 106 mmol/L      CO2 28 mmol/L      ANION GAP 11 mmol/L      BUN 10 mg/dL      Creatinine 0 77 mg/dL      Glucose 93 mg/dL      Calcium 9 1 mg/dL      AST 17 U/L      ALT 15 U/L      Alkaline Phosphatase 95 U/L      Total Protein 7 9 g/dL      Albumin 3 9 g/dL      Total Bilirubin 0 30 mg/dL      eGFR 101 ml/min/1 73sq m     Narrative:       [place] guidelines for Chronic Kidney Disease (CKD):     Stage 1 with normal or high GFR (GFR > 90 mL/min/1 73 square meters)    Stage 2 Mild CKD (GFR = 60-89 mL/min/1 73 square meters)    Stage 3A Moderate CKD (GFR = 45-59 mL/min/1 73 square meters)    Stage 3B Moderate CKD (GFR = 30-44 mL/min/1 73 square meters)    Stage 4 Severe CKD (GFR = 15-29 mL/min/1 73 square meters)    Stage 5 End Stage CKD (GFR <15 mL/min/1 73 square meters)  Note: GFR calculation is accurate only with a steady state creatinine    CBC and differential [[id number]]  (Abnormal) Collected:  01/29/20 1812    Lab Status:  Final result Specimen:  Blood from Arm, Right Updated:  01/29/20 1825     WBC 7 72 Thousand/uL      RBC 4 74 Million/uL      Hemoglobin 12 8 g/dL      Hematocrit 40 9 %      MCV 86 fL      MCH 27 0 pg      MCHC 31 3 g/dL      RDW 15 7 %      MPV 9 8 fL      Platelets 085 Thousands/uL      nRBC 0 /100 WBCs      Neutrophils Relative 65 %      Immat GRANS % 0 %      Lymphocytes Relative 23 %      Monocytes Relative 6 %      Eosinophils Relative 5 %      Basophils Relative 1 %      Neutrophils Absolute 5 01 Thousands/µL      Immature Grans Absolute 0 03 Thousand/uL      Lymphocytes Absolute 1 75 Thousands/µL      Monocytes Absolute 0 47 Thousand/µL      Eosinophils Absolute 0 39 Thousand/µL      Basophils Absolute 0 07 Thousands/µL                  No orders to display              Procedures  CriticalCare Time  Performed by: Rach Sargent DO  Authorized by: Rach Sargent DO     Critical care provider statement:     Critical care start time:  1/29/2020 6:15 PM    Critical care end time:  1/29/2020 6:45 PM    Critical care time was exclusive of:  Separately billable procedures and treating other patients    Critical care was necessary to treat or prevent imminent or life-threatening deterioration of the following conditions: Preeclampsia      Critical care was time spent personally by me on the following activities:  Obtaining history from patient or surrogate, development of treatment plan with patient or surrogate, discussions with consultants, evaluation of patient's response to treatment, examination of patient, ordering and review of laboratory studies, ordering and review of radiographic studies, re-evaluation of patient's condition and review of old charts ED Course  ED Course as of    4726 Dr Jimy Vernon to evaluate patient in the emergency department  Narendra S Felix Montelongo with Dr Jimy Vernon who recommends admission for possible postpartum preeclampsia  Recommends labetalol 20 mg IV        1846 Repeat blood pressure 159/79  MDM  Number of Diagnoses or Management Options  Left lower quadrant abdominal pain: new and requires workup  Postpartum hypertension: new and requires workup  Vaginal bleeding: new and requires workup  Diagnosis management comments: Patient who is status post  section presents with increasing abdominal pain and bleeding  Abdomen is soft with no rebound or guarding  Do not suspect acute surgical emergency  She has very mild dark blood within the vagina  There is no significant bleeding on exam and she has a normal hemoglobin  More concerning is her elevated blood pressure  Concern for postpartum preeclampsia  I discussed case with OBGYN who recommends treating with IV labetalol and admitting to Labor and delivery  Patient given IV labetalol in the emergency department and transported to Labor and delivery for further evaluation and treatment         Amount and/or Complexity of Data Reviewed  Clinical lab tests: ordered and reviewed  Tests in the medicine section of CPT®: ordered and reviewed  Review and summarize past medical records: yes  Discuss the patient with other providers: yes  Independent visualization of images, tracings, or specimens: yes    Risk of Complications, Morbidity, and/or Mortality  Presenting problems: high  Diagnostic procedures: moderate  Management options: high    Patient Progress  Patient progress: stable        Disposition  Final diagnoses:   Postpartum hypertension   Left lower quadrant abdominal pain   Vaginal bleeding     Time reflects when diagnosis was documented in both MDM as applicable and the Disposition within this note     Time User Action Codes Description Comment    2020  6:40 PM Jace ZHANG Add [O16 5] Postpartum hypertension     2020  6:40 PM Jace ZHANG Add [R10 32] Left lower quadrant abdominal pain     2020  6:40 PM Lincoln Soulier Add [N93 9] Vaginal bleeding     2020  8:42 AM Kenneth Mccarty Add [C33 298] Status post repeat low transverse  section       ED Disposition     ED Disposition Condition Date/Time Comment    Admit Stable   6:39 PM Case was discussed with Dr Sandip Ghosh and the patient's admission status was agreed to be Admission Status: observation status to the service of Dr Sandip Ghosh           Follow-up Information    None         Discharge Medication List as of 2020 11:14 AM      START taking these medications    Details   lidocaine (LIDODERM) 5 % Apply 1 patch topically daily Remove & Discard patch within 12 hours or as directed by MD cut to size of affected area, Starting 2020, Normal         CONTINUE these medications which have CHANGED    Details   oxyCODONE (ROXICODONE) 5 mg immediate release tablet Take 1 tablet (5 mg total) by mouth every 6 (six) hours as needed for severe pain for up to 10 daysMax Daily Amount: 20 mg, Starting 2020, Until Mon 2/10/2020, Normal         CONTINUE these medications which have NOT CHANGED    Details   acetaminophen (TYLENOL) 325 mg tablet Take 2 tablets (650 mg total) by mouth every 6 (six) hours as needed for headaches, Starting 2020, No Print      docusate sodium (COLACE) 100 mg capsule Take 1 capsule (100 mg total) by mouth 2 (two) times a day, Starting 2020, No Print      ibuprofen (MOTRIN) 600 mg tablet Take 1 tablet (600 mg total) by mouth every 6 (six) hours as needed for mild pain (cramping), Starting 2020, Normal      Prenatal Vit-Iron Carbonyl-FA (PRENATAL MULTIVITAMIN) TABS Take 1 tablet by mouth daily, Historical Med      SYNTHROID 200 MCG tablet Take 300 mcg by mouth daily , Starting Fri 7/5/2019, Historical Med           No discharge procedures on file      ED Provider  Electronically Signed by           Berhane Hernandez DO  01/31/20 6780

## 2020-01-29 NOTE — TELEPHONE ENCOUNTER
Pt calling, seeking pain medication stating she is soaking through pads 1 per hour, if not less, and having increased pain on left side of abdomen  Advised she be evaluated in ER

## 2020-01-30 LAB
ALBUMIN SERPL BCP-MCNC: 3.6 G/DL (ref 3.5–5)
ALP SERPL-CCNC: 96 U/L (ref 46–116)
ALT SERPL W P-5'-P-CCNC: 16 U/L (ref 12–78)
ANION GAP SERPL CALCULATED.3IONS-SCNC: 11 MMOL/L (ref 4–13)
AST SERPL W P-5'-P-CCNC: 15 U/L (ref 5–45)
BASOPHILS # BLD AUTO: 0.09 THOUSANDS/ΜL (ref 0–0.1)
BASOPHILS NFR BLD AUTO: 1 % (ref 0–1)
BILIRUB SERPL-MCNC: 0.2 MG/DL (ref 0.2–1)
BUN SERPL-MCNC: 9 MG/DL (ref 5–25)
CALCIUM SERPL-MCNC: 7.8 MG/DL (ref 8.3–10.1)
CHLORIDE SERPL-SCNC: 105 MMOL/L (ref 100–108)
CO2 SERPL-SCNC: 28 MMOL/L (ref 21–32)
CREAT SERPL-MCNC: 0.71 MG/DL (ref 0.6–1.3)
EOSINOPHIL # BLD AUTO: 0.45 THOUSAND/ΜL (ref 0–0.61)
EOSINOPHIL NFR BLD AUTO: 6 % (ref 0–6)
ERYTHROCYTE [DISTWIDTH] IN BLOOD BY AUTOMATED COUNT: 15.4 % (ref 11.6–15.1)
GFR SERPL CREATININE-BSD FRML MDRD: 111 ML/MIN/1.73SQ M
GLUCOSE SERPL-MCNC: 100 MG/DL (ref 65–140)
HCT VFR BLD AUTO: 37.7 % (ref 34.8–46.1)
HGB BLD-MCNC: 11.7 G/DL (ref 11.5–15.4)
IMM GRANULOCYTES # BLD AUTO: 0.02 THOUSAND/UL (ref 0–0.2)
IMM GRANULOCYTES NFR BLD AUTO: 0 % (ref 0–2)
LYMPHOCYTES # BLD AUTO: 2.05 THOUSANDS/ΜL (ref 0.6–4.47)
LYMPHOCYTES NFR BLD AUTO: 28 % (ref 14–44)
MCH RBC QN AUTO: 27 PG (ref 26.8–34.3)
MCHC RBC AUTO-ENTMCNC: 31 G/DL (ref 31.4–37.4)
MCV RBC AUTO: 87 FL (ref 82–98)
MONOCYTES # BLD AUTO: 0.57 THOUSAND/ΜL (ref 0.17–1.22)
MONOCYTES NFR BLD AUTO: 8 % (ref 4–12)
NEUTROPHILS # BLD AUTO: 4.2 THOUSANDS/ΜL (ref 1.85–7.62)
NEUTS SEG NFR BLD AUTO: 57 % (ref 43–75)
NRBC BLD AUTO-RTO: 0 /100 WBCS
PLATELET # BLD AUTO: 358 THOUSANDS/UL (ref 149–390)
PMV BLD AUTO: 10.1 FL (ref 8.9–12.7)
POTASSIUM SERPL-SCNC: 3.5 MMOL/L (ref 3.5–5.3)
PROT SERPL-MCNC: 7.4 G/DL (ref 6.4–8.2)
RBC # BLD AUTO: 4.33 MILLION/UL (ref 3.81–5.12)
SODIUM SERPL-SCNC: 144 MMOL/L (ref 136–145)
WBC # BLD AUTO: 7.38 THOUSAND/UL (ref 4.31–10.16)

## 2020-01-30 PROCEDURE — 85025 COMPLETE CBC W/AUTO DIFF WBC: CPT | Performed by: OBSTETRICS & GYNECOLOGY

## 2020-01-30 PROCEDURE — 99232 SBSQ HOSP IP/OBS MODERATE 35: CPT | Performed by: OBSTETRICS & GYNECOLOGY

## 2020-01-30 PROCEDURE — NC001 PR NO CHARGE: Performed by: OBSTETRICS & GYNECOLOGY

## 2020-01-30 PROCEDURE — 80053 COMPREHEN METABOLIC PANEL: CPT | Performed by: OBSTETRICS & GYNECOLOGY

## 2020-01-30 RX ORDER — ACETAMINOPHEN 325 MG/1
650 TABLET ORAL EVERY 6 HOURS SCHEDULED
Status: DISCONTINUED | OUTPATIENT
Start: 2020-01-30 | End: 2020-01-31 | Stop reason: HOSPADM

## 2020-01-30 RX ORDER — OXYCODONE HYDROCHLORIDE 5 MG/1
5 TABLET ORAL EVERY 6 HOURS PRN
Status: DISCONTINUED | OUTPATIENT
Start: 2020-01-30 | End: 2020-01-31 | Stop reason: HOSPADM

## 2020-01-30 RX ORDER — DOCUSATE SODIUM 100 MG/1
100 CAPSULE, LIQUID FILLED ORAL 2 TIMES DAILY
Status: DISCONTINUED | OUTPATIENT
Start: 2020-01-30 | End: 2020-01-31 | Stop reason: HOSPADM

## 2020-01-30 RX ADMIN — IBUPROFEN 600 MG: 600 TABLET ORAL at 13:26

## 2020-01-30 RX ADMIN — Medication 2 G/HR: at 08:24

## 2020-01-30 RX ADMIN — ACETAMINOPHEN 650 MG: 325 TABLET, FILM COATED ORAL at 08:23

## 2020-01-30 RX ADMIN — DOCUSATE SODIUM 100 MG: 100 CAPSULE, LIQUID FILLED ORAL at 18:16

## 2020-01-30 RX ADMIN — ACETAMINOPHEN 650 MG: 325 TABLET, FILM COATED ORAL at 14:22

## 2020-01-30 RX ADMIN — OXYCODONE HYDROCHLORIDE 5 MG: 5 TABLET ORAL at 06:11

## 2020-01-30 RX ADMIN — OXYCODONE HYDROCHLORIDE 5 MG: 5 TABLET ORAL at 12:07

## 2020-01-30 RX ADMIN — Medication 2 G/HR: at 20:08

## 2020-01-30 RX ADMIN — ACETAMINOPHEN 650 MG: 325 TABLET, FILM COATED ORAL at 02:29

## 2020-01-30 RX ADMIN — ACETAMINOPHEN 650 MG: 325 TABLET, FILM COATED ORAL at 20:07

## 2020-01-30 NOTE — PROGRESS NOTES
Progress Note - OB/GYN  Post-Partum Physician Note   Raysa Austin 29 y o  female MRN: [id number]  Unit/Bed#:  330-01 Encounter: [id number]    Subjective/Objective   Chief Complaint: Postpartum DAY 15 with pp hypertension/preeclampsia    Subjective: Pain is controlled with current analgesics  Medications being used: acetaminophen, ibuprofen (OTC) and narcotic analgesics including oxycodone  Though patient did note ice pack and positioning most beneficial   Patient does report some headache since magnesium starting, no scotomata, epigastric pain, edema  Patient reports thirsty and concerned that milk supply is dropping with fluid restriction  Voiding well will decrease fluid restriction and increase fluids  Eating a regular diet without difficulty  Flatus Yes   Bowel movements are normal  Voiding without difficulty  Ambulating Yes  Breastfeeding Yes  Lochia   moderate no longer getting occasional gushes of bleeding, improving    Objective:    Vitals: Blood pressure 128/63, pulse 75, temperature 98 1 °F (36 7 °C), temperature source Temporal, resp  rate 18, last menstrual period 04/23/2019, SpO2 94 %, currently breastfeeding        Intake/Output Summary (Last 24 hours) at 1/30/2020 0730  Last data filed at 1/30/2020 0201  Gross per 24 hour   Intake    Output 600 ml   Net -600 ml       Physical Exam:  GEN: Raysa Austin appears well, alert and oriented x 3, pleasant and cooperative    HEART: regular rhythm, normal S1 and S2, no murmurs, clicks, gallops or rubs   LUNGS: clear to auscultation bilaterally; no wheezes, rales, or rhonchi   ABDOMEN: normal bowel sounds, soft, no tenderness, no distention  : Uterus firm at umbilicus mildly tender u-2, incision clean dry intact, less uncomfortable than yesterday  EXTREMITIES: peripheral pulses normal; no clubbing, cyanosis, or edema  Reflexes: 1+    Labs:   Recent Results (from the past 24 hour(s))   CBC and differential    Collection Time: 01/29/20  6:12 PM Result Value Ref Range    WBC 7 72 4 31 - 10 16 Thousand/uL    RBC 4 74 3 81 - 5 12 Million/uL    Hemoglobin 12 8 11 5 - 15 4 g/dL    Hematocrit 40 9 34 8 - 46 1 %    MCV 86 82 - 98 fL    MCH 27 0 26 8 - 34 3 pg    MCHC 31 3 (L) 31 4 - 37 4 g/dL    RDW 15 7 (H) 11 6 - 15 1 %    MPV 9 8 8 9 - 12 7 fL    Platelets 062 316 - 377 Thousands/uL    nRBC 0 /100 WBCs    Neutrophils Relative 65 43 - 75 %    Immat GRANS % 0 0 - 2 %    Lymphocytes Relative 23 14 - 44 %    Monocytes Relative 6 4 - 12 %    Eosinophils Relative 5 0 - 6 %    Basophils Relative 1 0 - 1 %    Neutrophils Absolute 5 01 1 85 - 7 62 Thousands/µL    Immature Grans Absolute 0 03 0 00 - 0 20 Thousand/uL    Lymphocytes Absolute 1 75 0 60 - 4 47 Thousands/µL    Monocytes Absolute 0 47 0 17 - 1 22 Thousand/µL    Eosinophils Absolute 0 39 0 00 - 0 61 Thousand/µL    Basophils Absolute 0 07 0 00 - 0 10 Thousands/µL   Comprehensive metabolic panel    Collection Time: 01/29/20  6:12 PM   Result Value Ref Range    Sodium 145 136 - 145 mmol/L    Potassium 3 9 3 5 - 5 3 mmol/L    Chloride 106 100 - 108 mmol/L    CO2 28 21 - 32 mmol/L    ANION GAP 11 4 - 13 mmol/L    BUN 10 5 - 25 mg/dL    Creatinine 0 77 0 60 - 1 30 mg/dL    Glucose 93 65 - 140 mg/dL    Calcium 9 1 8 3 - 10 1 mg/dL    AST 17 5 - 45 U/L    ALT 15 12 - 78 U/L    Alkaline Phosphatase 95 46 - 116 U/L    Total Protein 7 9 6 4 - 8 2 g/dL    Albumin 3 9 3 5 - 5 0 g/dL    Total Bilirubin 0 30 0 20 - 1 00 mg/dL    eGFR 101 ml/min/1 73sq m   Protime-INR    Collection Time: 01/29/20  6:21 PM   Result Value Ref Range    Protime 12 1 11 6 - 14 5 seconds    INR 0 95 0 84 - 1 19   APTT    Collection Time: 01/29/20  6:21 PM   Result Value Ref Range    PTT 33 23 - 37 seconds   UA (URINE) with reflex to Scope    Collection Time: 01/29/20  6:45 PM   Result Value Ref Range    Color, UA Yellow     Clarity, UA Clear     Specific Gravity, UA 1 020 1 003 - 1 030    pH, UA 6 5 4 5, 5 0, 5 5, 6 0, 6 5, 7 0, 7 5, 8 0 Leukocytes, UA Negative Negative    Nitrite, UA Negative Negative    Protein, UA Trace (A) Negative mg/dl    Glucose, UA Negative Negative mg/dl    Ketones, UA Negative Negative mg/dl    Urobilinogen, UA 1 0 0 2, 1 0 E U /dl E U /dl    Bilirubin, UA Negative Negative    Blood, UA Moderate (A) Negative   Protein / creatinine ratio, urine    Collection Time: 01/29/20  6:45 PM   Result Value Ref Range    Creatinine, Ur 190 0 mg/dL    Protein Urine Random 43 mg/dL    Prot/Creat Ratio, Ur 0 23 (H) 0 00 - 0 10   Urine Microscopic    Collection Time: 01/29/20  6:45 PM   Result Value Ref Range    RBC, UA 0-1 (A) None Seen, 0-5 /hpf    WBC, UA 4-10 (A) None Seen, 0-5, 5-55, 5-65 /hpf    Epithelial Cells Occasional None Seen, Occasional /hpf    Bacteria, UA Occasional None Seen, Occasional /hpf    MUCUS THREADS Moderate (A) None Seen   CBC and differential    Collection Time: 01/30/20  6:35 AM   Result Value Ref Range    WBC 7 38 4 31 - 10 16 Thousand/uL    RBC 4 33 3 81 - 5 12 Million/uL    Hemoglobin 11 7 11 5 - 15 4 g/dL    Hematocrit 37 7 34 8 - 46 1 %    MCV 87 82 - 98 fL    MCH 27 0 26 8 - 34 3 pg    MCHC 31 0 (L) 31 4 - 37 4 g/dL    RDW 15 4 (H) 11 6 - 15 1 %    MPV 10 1 8 9 - 12 7 fL    Platelets 550 982 - 437 Thousands/uL    nRBC 0 /100 WBCs    Neutrophils Relative 57 43 - 75 %    Immat GRANS % 0 0 - 2 %    Lymphocytes Relative 28 14 - 44 %    Monocytes Relative 8 4 - 12 %    Eosinophils Relative 6 0 - 6 %    Basophils Relative 1 0 - 1 %    Neutrophils Absolute 4 20 1 85 - 7 62 Thousands/µL    Immature Grans Absolute 0 02 0 00 - 0 20 Thousand/uL    Lymphocytes Absolute 2 05 0 60 - 4 47 Thousands/µL    Monocytes Absolute 0 57 0 17 - 1 22 Thousand/µL    Eosinophils Absolute 0 45 0 00 - 0 61 Thousand/µL    Basophils Absolute 0 09 0 00 - 0 10 Thousands/µL   Comprehensive metabolic panel    Collection Time: 01/30/20  6:35 AM   Result Value Ref Range    Sodium 144 136 - 145 mmol/L    Potassium 3 5 3 5 - 5 3 mmol/L Chloride 105 100 - 108 mmol/L    CO2 28 21 - 32 mmol/L    ANION GAP 11 4 - 13 mmol/L    BUN 9 5 - 25 mg/dL    Creatinine 0 71 0 60 - 1 30 mg/dL    Glucose 100 65 - 140 mg/dL    Calcium 7 8 (L) 8 3 - 10 1 mg/dL    AST 15 5 - 45 U/L    ALT 16 12 - 78 U/L    Alkaline Phosphatase 96 46 - 116 U/L    Total Protein 7 4 6 4 - 8 2 g/dL    Albumin 3 6 3 5 - 5 0 g/dL    Total Bilirubin 0 20 0 20 - 1 00 mg/dL    eGFR 111 ml/min/1 73sq m         MEDS:   Current Facility-Administered Medications   Medication Dose Route Frequency    acetaminophen (TYLENOL) tablet 650 mg  650 mg Oral Q6H ROGER    ibuprofen (MOTRIN) tablet 600 mg  600 mg Oral Q6H PRN    lactated ringers infusion  25 mL/hr Intravenous Continuous    levothyroxine tablet 250 mcg  250 mcg Oral Daily    magnesium sulfate 20 g/500 mL infusion (premix)  2 g/hr Intravenous Continuous    oxyCODONE (ROXICODONE) IR tablet 5 mg  5 mg Oral Q6H PRN     Invasive Devices     Peripheral Intravenous Line            Peripheral IV 20 Right Antecubital less than 1 day                  Assessment/Plan     Assessment:  Patient Active Problem List   Diagnosis    Chronic pain syndrome    Chronic left-sided low back pain with left-sided sciatica    Anxiety    Asthma    Chronic interstitial cystitis    Chronic pain    Osteoarthrosis, wrist    Increased nuchal translucency space on fetal ultrasound    Macrosomia affecting management of mother in third trimester    Status post repeat low transverse  section     delivery delivered    Postpartum hypertension       Plan:  1) Postpartum day #  15 status post repeat  section with postpartum hypertension  2) continue magnesium for 24 hours  3) monitor bp    Mattie Her MD  2020  7:30 AM

## 2020-01-30 NOTE — H&P
H&P Exam - Obstetrics   Marilyn Casas 29 y o  female MRN: [id number]  Unit/Bed#: CORBY Encounter: [id number]      History of Present Illness     Chief Complaint: Preeclampsia with severe features    HPI:  Marilyn Casas is a 29 y o  [id number] PPD #14 who is being admitted for preeclampsia with severe features  Patient underwent a uncomplicated repeat  section on 1/15/2020 at 38w1d for indication of symptomatic polyhydramnios   was 9 lb 12 oz, Apgars 9 and 9 at 1 and 5 minutes  Her pregnancy was also complicated by history of methadone use, asthma, anemia, and obesity  She was discharged on postoperative day 2 after an uncomplicated postpartum course  She did not have hypertensive disease at time of discharge  Patient called her OBGYN today complaining of increased gushes of bright red vaginal bleeding  She reports increased tenderness along the left side of the skin incision as well as mild uterine tenderness  She has been taking motrin and tylenol as needed  She denies fever, nausea, vomiting, chest pain, shortness of breath, right upper quadrant abdominal pain, non dependent swelling or edema  She reports diarrhea, was on colace after pregnancy to help regulate her bowel movements  In the ED, she was noted to have a blood pressure of 179/97 followed by a 195/123  She was given 20mg IV labetalol  Due to severity of her blood pressures and concern for postpartum preeclampsia, she was transferred to labor and delivery for further evaluation and treatment  She is Dr Dewayne Abarca patient         OB History    Para Term  AB Living   7 3 3   4 3   SAB TAB Ectopic Multiple Live Births   2   2 0 1      # Outcome Date GA Lbr [place]/2nd Weight Sex Delivery Anes PTL Lv   7 Term 01/15/20 38w1d  4430 g (9 lb 12 3 oz) F CS-LTranv Spinal N ALBINO   6 Term 13 39w0d   F CS-LTranv         Complications: Macrosomia, Polyhydramnios   5 Ectopic            4 Ectopic            3 SAB  SAB      2 Term 10/17/04 41w0d  3941 g (8 lb 11 oz) M Vag-Spont      1 2003          Review of Systems   Constitutional: Positive for fatigue  Negative for activity change, appetite change, chills, diaphoresis, fever and unexpected weight change  Eyes: Negative  Respiratory: Negative  Cardiovascular: Negative  Gastrointestinal: Negative  Incisional tenderness along the left side of the incision   Endocrine: Negative  Genitourinary: Positive for vaginal bleeding  Allergic/Immunologic: Negative  Neurological: Negative  Hematological: Negative  Psychiatric/Behavioral: Negative          Historical Information   Past Medical History:   Diagnosis Date    Anxiety     Chronic pain     left hip    Ectopic pregnancy with intrauterine pregnancy      and     Hashimoto's disease     Hashimoto's thyroiditis     IBS (irritable bowel syndrome)     Pituitary tumor     Polycystic ovarian syndrome     Varicella      Past Surgical History:   Procedure Laterality Date     SECTION      DERMOID CYST  EXCISION      ECTOPIC PREGNANCY SURGERY      NH  DELIVERY ONLY N/A 1/15/2020    Procedure:  SECTION () REPEAT;  Surgeon: Penny Savage MD;  Location: [facility];  Service: Obstetrics     Social History   Social History     Substance and Sexual Activity   Alcohol Use Not Currently     Social History     Substance and Sexual Activity   Drug Use No     Social History     Tobacco Use   Smoking Status Former Smoker    Packs/day: 0 50    Types: Cigarettes   Smokeless Tobacco Former User    Quit date: 2019       Meds/Allergies      (Not in a hospital admission)     Allergies   Allergen Reactions    Demerol [Meperidine] Other (See Comments)     "hot and trouble breathing"    Ketorolac Hives    Latex Rash    Lyrica [Pregabalin] Other (See Comments)     "suicidal thoughts"    Morphine And Related Rash    Other      Adhesive tape OBJECTIVE:    Vitals: Blood pressure (!) 195/123, pulse 76, temperature 98 1 °F (36 7 °C), temperature source Oral, resp  rate 18, last menstrual period 04/23/2019, SpO2 97 %, currently breastfeeding  There is no height or weight on file to calculate BMI  Physical Exam   Constitutional: She is oriented to person, place, and time  She appears well-developed and well-nourished  No distress  Cardiovascular: Normal rate, regular rhythm and normal heart sounds  Exam reveals no gallop and no friction rub  No murmur heard  Pulmonary/Chest: Effort normal and breath sounds normal  No stridor  No respiratory distress  She has no wheezes  She has no rales  She exhibits no tenderness  Abdominal: Soft  Bowel sounds are normal    Neurological: She is alert and oriented to person, place, and time  Reflex Scores:       Patellar reflexes are 1+ on the right side and 1+ on the left side  Skin: She is not diaphoretic  Psychiatric: She has a normal mood and affect  Her behavior is normal  Thought content normal    Vitals reviewed        Invasive Devices     Peripheral Intravenous Line            Peripheral IV 01/29/20 Right Antecubital less than 1 day                  Assessment/Plan     ASSESSMENT:  32yo [id number] PPD #14 presenting with preeclampsia with severe features    PLAN:    Preeclampsia with severe features  Preeclamptic labs unremarkable, Cr 0 77, AST/ALT 17/15  P/C 0 23  S/p 20 mg IV labetalol  Start magnesium for seizure prophylaxis  Continue to closely monitor blood pressures  Consider starting oral antihypertensive medication    Increased vaginal bleeding  Likely secondary to subinvolution of the placental site    Mild uterine tenderness  WBC 7, afebrile, no malodorous lochia  Low suspicion for endometritis  Will continue to monitor  Motrin/tylenol prn    FEN: regular  DVT ppx: SCDs, ambulation      Discussed with Dr Kelli Palencia      This patient will be an INPATIENT  and I certify the anticipated length of stay is >2 Midnights      Kamila Hendrix MD  1/29/2020  7:23 PM

## 2020-01-30 NOTE — PLAN OF CARE
Problem: CARDIOVASCULAR - ADULT  Goal: Maintains optimal cardiac output and hemodynamic stability  Description  INTERVENTIONS:  - Monitor I/O, vital signs and rhythm  - Monitor for S/S and trends of decreased cardiac output  - Administer and titrate ordered vasoactive medications to optimize hemodynamic stability  - Assess quality of pulses, skin color and temperature  - Assess for signs of decreased coronary artery perfusion  - Instruct patient to report change in severity of symptoms  Outcome: Progressing     Problem: DISCHARGE PLANNING - CARE MANAGEMENT  Goal: Discharge to post-acute care or home with appropriate resources  Description  INTERVENTIONS:  - Conduct assessment to determine patient/family and health care team treatment goals, and need for post-acute services based on payer coverage, community resources, and patient preferences, and barriers to discharge  - Address psychosocial, clinical, and financial barriers to discharge as identified in assessment in conjunction with the patient/family and health care team  - Arrange appropriate level of post-acute services according to patients   needs and preference and payer coverage in collaboration with the physician and health care team  - Communicate with and update the patient/family, physician, and health care team regarding progress on the discharge plan  - Arrange appropriate transportation to post-acute venues  Outcome: Progressing     Problem: PAIN - ADULT  Goal: Verbalizes/displays adequate comfort level or baseline comfort level  Description  Interventions:  - Encourage patient to monitor pain and request assistance  - Assess pain using appropriate pain scale   0-10  - Administer analgesics based on type and severity of pain and evaluate response  - Implement non-pharmacological measures as appropriate and evaluate response  - Consider cultural and social influences on pain and pain management  - Notify physician/advanced practitioner if interventions unsuccessful or patient reports new pain   Outcome: Progressing     Problem: INFECTION - ADULT  Goal: Absence or prevention of progression during hospitalization  Description  INTERVENTIONS:  - Assess and monitor for signs and symptoms of infection  - Monitor lab/diagnostic results  - Monitor all insertion sites, i e  indwelling lines  - [place] appropriate cooling/warming therapies per order  - Administer medications as ordered  - Instruct and encourage patient and family to use good hand hygiene technique   Outcome: Progressing  Goal: Absence of fever/infection during neutropenic period  Description  INTERVENTIONS:  - Monitor WBC    Outcome: Progressing     Problem: SAFETY ADULT  Goal: Patient will remain free of falls  Description  INTERVENTIONS:  - Assess patient frequently for physical needs  -  Identify cognitive and physical deficits and behaviors that affect risk of falls    -  [place] fall precautions as indicated by assessment   - Educate patient/family on patient safety including physical limitations  - Instruct patient to call for assistance with activity based on assessment  - Modify environment to reduce risk of injury   Outcome: Progressing  Goal: Maintain or return to baseline ADL function  Description  INTERVENTIONS:  -  Assess patient's ability to carry out ADLs; assess patient's baseline for ADL function and identify physical deficits which impact ability to perform ADLs (bathing, care of mouth/teeth, toileting, grooming, dressing, etc )  - Assess/evaluate cause of self-care deficits   - Assess range of motion  - Assess patient's mobility; develop plan if impaired  - Assess patient's need for assistive devices and provide as appropriate  - Encourage maximum independence but intervene and supervise when necessary  - Involve family in performance of ADLs  - Assess for home care needs following discharge   - Provide patient education as appropriate   Outcome: Progressing  Goal: Maintain or return mobility status to optimal level  Description  INTERVENTIONS:  - Assess patient's baseline mobility status (ambulation, transfers, stairs, etc )    - Identify cognitive and physical deficits and behaviors that affect mobility  - [place] fall precautions as indicated by assessment  - Instruct patient to call for assistance with activity based on assessment   Outcome: Progressing     Problem: Knowledge Deficit  Goal: Patient/family/caregiver demonstrates understanding of disease process, treatment plan, medications, and discharge instructions  Description  Complete learning assessment and assess knowledge base    Interventions:  - Provide teaching at level of understanding  - Provide teaching via preferred learning methods  Outcome: Progressing

## 2020-01-30 NOTE — UTILIZATION REVIEW
Initial Clinical Review    Admission: Date/Time/Statement: Inpatient Admission Orders (From admission, onward)     Ordered        20 191  Inpatient Admission  Once                    Inpatient Admission     Standing Status:   Standing     Number of Occurrences:   1     Order Specific Question:   Admitting Physician     Answer:   Trell Wong [[id number]]     Order Specific Question:   Level of Care     Answer:   Med Surg [16]     Order Specific Question:   Estimated length of stay     Answer:   More than 2 Midnights     Order Specific Question:   Certification     Answer:   I certify that inpatient services are medically necessary for this patient for a duration of greater than two midnights  See H&P and MD Progress Notes for additional information about the patient's course of treatment  ED Arrival Information     Expected Arrival Acuity Means of Arrival Escorted By Service Admission Type    - 2020 17:32 Emergent Walk-In Spouse OB/GYN Emergency    Arrival Complaint    Post  1/15/20/Abd Pain/Bleeding        Chief Complaint   Patient presents with    Vaginal Bleeding     Pt post C section from 1/15  Pt reports being in "Severe pain suddenly" 5 days ago that has worsened and "moved the left side " Pt states "I feel like my sutures opened, but I look at them and their arent" Pt reports tender to touch  Pt reports vaginal bleeding started 3 days ago and is "pretty bad, 2 pads in an hour"     Postpartum Complications     Assessment/Plan: 30 yo female from home to ED admitted as Inpatient due to Preeclampsia with severe features  [id number] post partum day #27 with uncomplicated repeat  section on 1/15/2020 at 38w1d for indication of symptomatic polyhydramnios  Pregnancy complicate by history of Methadone use, asthma, anemia & obesity  She wass DC on posterative day #2 after  Uncomplicated course-no hypertensive disease at DC    Patient called her OBGYN today complaining of increased gushes of bright red vaginal bleeding  She reports increased tenderness along the left side of the skin incision as well as mild uterine tenderness  She has been taking motrin and tylenol as needed  She denies fever, nausea, vomiting, chest pain, shortness of breath, right upper quadrant abdominal pain, non dependent swelling or edema  She reports diarrhea, was on colace after pregnancy to help regulate her bowel movements  In the ED, she was noted to have a blood pressure of 179/97 followed by a 195/123  She was given 20mg IV labetalol  Due to severity of her blood pressures and concern for postpartum preeclampsia, she was transferred to labor and delivery for further evaluation and treatment   Started on IV Magnesium for hypertension    Preeclampsia with severe features  Preeclamptic labs unremarkable, Cr 0 77, AST/ALT 17/15  P/C 0 23  S/p 20 mg IV labetalol  Start magnesium for seizure prophylaxis  Continue to closely monitor blood pressures  Consider starting oral antihypertensive medication  Increased vaginal bleeding  Likely secondary to subinvolution of the placental site  Mild uterine tenderness  WBC 7, afebrile, no malodorous lochia  Low suspicion for endometritis  Will continue to monitor  Motrin/tylenol prn    ED Triage Vitals   Temperature Pulse Respirations Blood Pressure SpO2   01/29/20 1745 01/29/20 1745 01/29/20 1745 01/29/20 1756 01/29/20 1745   98 1 °F (36 7 °C) 80 18 (!) 173/97 98 %      Temp Source Heart Rate Source Patient Position - Orthostatic VS BP Location FiO2 (%)   01/29/20 1745 01/29/20 1745 01/29/20 1745 01/29/20 1745 --   Oral Monitor Sitting Left arm       Pain Score       01/29/20 1745       7        Wt Readings from Last 1 Encounters:   01/21/20 93 4 kg (206 lb)     Additional Vital Signs:   Date/Time  Temp  Pulse  Resp  BP  SpO2  O2 Device  Patient Position - Orthostatic VS   01/30/20 1200  97 8 °F (36 6 °C)  59  18  120/74  97 %  None (Room air)  Lying   01/30/20 1000  98 1 °F (36 7 °C)  66 18  142/87  98 %  None (Room air)  Lying   01/30/20 0823  98 7 °F (37 1 °C)  68  18  112/74  100 %  None (Room air)  Lying   01/30/20 0600  98 1 °F (36 7 °C)  75  18  128/63  94 %  None (Room air)     01/30/20 0400  98 1 °F (36 7 °C)  71  16  100/55  97 %  None (Room air)     01/30/20 0201  98 3 °F (36 8 °C)  81  20  122/67  97 %  None (Room air)     01/30/20 0000  98 °F (36 7 °C)  84  18  124/66  98 %  None (Room air)  Lying   01/29/20 2300  97 6 °F (36 4 °C)  88  18  120/63      Lying   01/29/20 2159        149/67      Sitting   01/29/20 2130          98 %       01/29/20 2115        138/64      Sitting   01/29/20 2053        136/64      Sitting   01/29/20 2018        155/75      Lying   01/29/20 1845        195/123Abnormal          01/29/20 1811            None (Room air)     01/29/20 1756    76  18  173/97Abnormal   97 %  None (Room air)  Sitting   01/29/20 1745  98 1 °F (36 7 °C)  80  18    98 %  None (Room air)  Sitting          Pertinent Labs/Diagnostic Test Results:   Results from last 7 days   Lab Units 01/30/20  0635 01/29/20  1812   WBC Thousand/uL 7 38 7 72   HEMOGLOBIN g/dL 11 7 12 8   HEMATOCRIT % 37 7 40 9   PLATELETS Thousands/uL 358 388   NEUTROS ABS Thousands/µL 4 20 5 01         Results from last 7 days   Lab Units 01/30/20  0635 01/29/20  1812   SODIUM mmol/L 144 145   POTASSIUM mmol/L 3 5 3 9   CHLORIDE mmol/L 105 106   CO2 mmol/L 28 28   ANION GAP mmol/L 11 11   BUN mg/dL 9 10   CREATININE mg/dL 0 71 0 77   EGFR ml/min/1 73sq m 111 101   CALCIUM mg/dL 7 8* 9 1     Results from last 7 days   Lab Units 01/30/20  0635 01/29/20  1812   AST U/L 15 17   ALT U/L 16 15   ALK PHOS U/L 96 95   TOTAL PROTEIN g/dL 7 4 7 9   ALBUMIN g/dL 3 6 3 9   TOTAL BILIRUBIN mg/dL 0 20 0 30         Results from last 7 days   Lab Units 01/30/20  0635 01/29/20  1812   GLUCOSE RANDOM mg/dL 100 93       Results from last 7 days   Lab Units 01/29/20  1821   PROTIME seconds 12 1 INR  0 95   PTT seconds 33       Results from last 7 days   Lab Units 01/29/20  1845   CLARITY UA  Clear   COLOR UA  Yellow   SPEC GRAV UA  1 020   PH UA  6 5   GLUCOSE UA mg/dl Negative   KETONES UA mg/dl Negative   BLOOD UA  Moderate*   PROTEIN UA mg/dl Trace*   NITRITE UA  Negative   BILIRUBIN UA  Negative   UROBILINOGEN UA E U /dl 1 0   LEUKOCYTES UA  Negative   WBC UA /hpf 4-10*   RBC UA /hpf 0-1*   BACTERIA UA /hpf Occasional   EPITHELIAL CELLS WET PREP /hpf Occasional   MUCUS THREADS  Moderate*   CREATININE UR mg/dL 190 0   PROTEIN UR mg/dL 43   PROT/CREAT RATIO UR  0 23*           ED Treatment:   Medication Administration from 01/29/2020 1732 to 01/29/2020 1929       Date/Time Order Dose Route Action     01/29/2020 1847 Labetalol HCl (NORMODYNE) injection 20 mg 20 mg Intravenous Given        Past Medical History:   Diagnosis Date    Anxiety     Chronic pain     left hip    Ectopic pregnancy with intrauterine pregnancy     2009 and 2011    Hashimoto's disease     Hashimoto's thyroiditis     IBS (irritable bowel syndrome)     Pituitary tumor     Polycystic ovarian syndrome     Varicella      Admitting Diagnosis: Vaginal bleeding [N93 9]  Abdominal pain [R10 9]  Bleeding behind the abdominal cavity [R58]  Postpartum hypertension [O16 5]  Post-operative complication [U63  9XXA]  Left lower quadrant abdominal pain [R10 32]  Age/Sex: 29 y o  female  Admission Orders:  Check vitals Q 2hr reflexes, pulse oximetry   & clonus checks Q 2h  Auscultate lung sounds Q 2hr  Deep tendon reflexes  Call for I&O<60ml/hrx2 hours      Scheduled Medications:    Medications:  acetaminophen 650 mg Oral Q6H [facility]   levothyroxine 250 mcg Oral Daily     Continuous IV Infusions:    lactated ringers 25 mL/hr Intravenous Continuous   magnesium sulfate 2 g/hr Intravenous Continuous     PRN Meds:    ibuprofen 600 mg Oral Q6H PRN   oxyCODONE 5 mg Oral Q6H PRN       Network Utilization Review Department  [email or web address]  ATTENTION: Please call with any questions or concerns to [phone] and carefully listen to the prompts so that you are directed to the right person  All voicemails are confidential   Matt Cifuentes all requests for admission clinical reviews, approved or denied determinations and any other requests to dedicated fax number below belonging to the campus where the patient is receiving treatment   List of dedicated fax numbers for the Facilities:  [address] DENIALS (Administrative/Medical Necessity) [phone]   [address] (Maternity/NICU/Pediatrics) [phone]   [facility] [phone]   Hira Palmer [phone]   Frankie Moreland [phone]   Mikal Rose [phone]   [address] [place] [address] [phone]   [facility]  [phone]   [address] [facility]  [address] [facility] [address] [facility] [phone]

## 2020-01-30 NOTE — PLAN OF CARE
Problem: CARDIOVASCULAR - ADULT  Goal: Maintains optimal cardiac output and hemodynamic stability  Description  INTERVENTIONS:  - Monitor I/O, vital signs and rhythm  - Monitor for S/S and trends of decreased cardiac output  - Administer and titrate ordered vasoactive medications to optimize hemodynamic stability  - Assess quality of pulses, skin color and temperature  - Assess for signs of decreased coronary artery perfusion  - Instruct patient to report change in severity of symptoms  Outcome: Progressing     Problem: DISCHARGE PLANNING - CARE MANAGEMENT  Goal: Discharge to post-acute care or home with appropriate resources  Description  INTERVENTIONS:  - Conduct assessment to determine patient/family and health care team treatment goals, and need for post-acute services based on payer coverage, community resources, and patient preferences, and barriers to discharge  - Address psychosocial, clinical, and financial barriers to discharge as identified in assessment in conjunction with the patient/family and health care team  - Arrange appropriate level of post-acute services according to patients   needs and preference and payer coverage in collaboration with the physician and health care team  - Communicate with and update the patient/family, physician, and health care team regarding progress on the discharge plan  - Arrange appropriate transportation to post-acute venues  Outcome: Progressing

## 2020-01-30 NOTE — PROGRESS NOTES
Progress Note - OB/GYN   Asher Bales 29 y o  female MRN: [id number]  Unit/Bed#:  330-01 Encounter: [id number]    Assessment:  Post partum Day #15 s/p readmission for postpartum preeclampsia with severe range blood pressures, stable, baby in room    Plan:  1) preeclampsia with severe features   P:C ratio 0 23   S/p 20 mg IV labetalol   Last severe range blood pressure at 640 5:00 p m , since then 100s-150s/60s-70s   On magnesium sulfate infusion  2) Continue routine post partum care   Encourage ambulation   Encourage breastfeeding   Anticipate discharge tomorrow     Subjective/Objective   Chief Complaint:     Patient is doing well  Lochia WNL  Initially complained of vaginal bleeding that seems to be within normal limits  Pain well controlled on current regimen, patient request scheduled Tylenol  Patient is insistent milk supply has decreased since inpatient admission fluid limit being increased to 4 L    Subjective:     Pain: yes, cramping, improved with meds  Tolerating PO: yes  Voiding: yes  Flatus: yes  Ambulating: yes  Chest pain: no  Shortness of breath: no  Leg pain: no  Lochia: minimal    Objective:     Vitals: /63 (BP Location: Left arm)   Pulse 75   Temp 98 1 °F (36 7 °C) (Temporal)   Resp 18   LMP 04/23/2019   SpO2 94%       Intake/Output Summary (Last 24 hours) at 1/30/2020 0739  Last data filed at 1/30/2020 0201  Gross per 24 hour   Intake    Output 600 ml   Net -600 ml       Lab Results   Component Value Date    WBC 7 38 01/30/2020    HGB 11 7 01/30/2020    HCT 37 7 01/30/2020    MCV 87 01/30/2020     01/30/2020       Physical Exam:     Gen: AAOx3, NAD  CV: RRR  Lungs: CTA b/l  Abd: Soft, non-tender, non-distended, no rebound or guarding  Uterine fundus firm and non-tender, 3 cm below the umbilicus  Incision clean, dry, intact and well healing  No areas of ecchymosis or swelling; no signs of hematoma or compromise sutures    Ext: Non tender    Panchito Bryan MD  1/30/2020  7:39 AM

## 2020-01-31 VITALS
RESPIRATION RATE: 18 BRPM | HEART RATE: 53 BPM | TEMPERATURE: 98.7 F | DIASTOLIC BLOOD PRESSURE: 76 MMHG | OXYGEN SATURATION: 97 % | SYSTOLIC BLOOD PRESSURE: 128 MMHG

## 2020-01-31 PROCEDURE — NC001 PR NO CHARGE: Performed by: OBSTETRICS & GYNECOLOGY

## 2020-01-31 PROCEDURE — 99231 SBSQ HOSP IP/OBS SF/LOW 25: CPT | Performed by: OBSTETRICS & GYNECOLOGY

## 2020-01-31 RX ORDER — LIDOCAINE 50 MG/G
1 PATCH TOPICAL DAILY
Qty: 15 PATCH | Refills: 0 | Status: SHIPPED | OUTPATIENT
Start: 2020-01-31

## 2020-01-31 RX ORDER — OXYCODONE HYDROCHLORIDE 5 MG/1
5 TABLET ORAL EVERY 6 HOURS PRN
Qty: 10 TABLET | Refills: 0 | Status: SHIPPED | OUTPATIENT
Start: 2020-01-31 | End: 2020-02-10 | Stop reason: ALTCHOICE

## 2020-01-31 RX ORDER — OXYCODONE HYDROCHLORIDE 5 MG/1
5 TABLET ORAL EVERY 6 HOURS PRN
Qty: 10 TABLET | Refills: 0 | Status: SHIPPED | OUTPATIENT
Start: 2020-01-31 | End: 2020-01-31

## 2020-01-31 RX ADMIN — DOCUSATE SODIUM 100 MG: 100 CAPSULE, LIQUID FILLED ORAL at 11:30

## 2020-01-31 RX ADMIN — OXYCODONE HYDROCHLORIDE 5 MG: 5 TABLET ORAL at 00:25

## 2020-01-31 RX ADMIN — ACETAMINOPHEN 650 MG: 325 TABLET, FILM COATED ORAL at 07:59

## 2020-01-31 RX ADMIN — OXYCODONE HYDROCHLORIDE 5 MG: 5 TABLET ORAL at 08:36

## 2020-01-31 RX ADMIN — IBUPROFEN 600 MG: 600 TABLET ORAL at 00:25

## 2020-01-31 RX ADMIN — IBUPROFEN 600 MG: 600 TABLET ORAL at 08:36

## 2020-01-31 NOTE — PLAN OF CARE
Problem: CARDIOVASCULAR - ADULT  Goal: Maintains optimal cardiac output and hemodynamic stability  Description  INTERVENTIONS:  - Monitor I/O, vital signs and rhythm  - Monitor for S/S and trends of decreased cardiac output  - Administer and titrate ordered vasoactive medications to optimize hemodynamic stability  - Assess quality of pulses, skin color and temperature  - Assess for signs of decreased coronary artery perfusion  - Instruct patient to report change in severity of symptoms  Outcome: Progressing     Problem: DISCHARGE PLANNING - CARE MANAGEMENT  Goal: Discharge to post-acute care or home with appropriate resources  Description  INTERVENTIONS:  - Conduct assessment to determine patient/family and health care team treatment goals, and need for post-acute services based on payer coverage, community resources, and patient preferences, and barriers to discharge  - Address psychosocial, clinical, and financial barriers to discharge as identified in assessment in conjunction with the patient/family and health care team  - Arrange appropriate level of post-acute services according to patients   needs and preference and payer coverage in collaboration with the physician and health care team  - Communicate with and update the patient/family, physician, and health care team regarding progress on the discharge plan  - Arrange appropriate transportation to post-acute venues  Outcome: Progressing     Problem: PAIN - ADULT  Goal: Verbalizes/displays adequate comfort level or baseline comfort level  Description  Interventions:  - Encourage patient to monitor pain and request assistance  - Assess pain using appropriate pain scale   0-10  - Administer analgesics based on type and severity of pain and evaluate response  - Implement non-pharmacological measures as appropriate and evaluate response  - Consider cultural and social influences on pain and pain management  - Notify physician/advanced practitioner if interventions unsuccessful or patient reports new pain   Outcome: Progressing     Problem: INFECTION - ADULT  Goal: Absence or prevention of progression during hospitalization  Description  INTERVENTIONS:  - Assess and monitor for signs and symptoms of infection  - Monitor lab/diagnostic results  - Monitor all insertion sites, i e  indwelling lines  - [place] appropriate cooling/warming therapies per order  - Administer medications as ordered  - Instruct and encourage patient and family to use good hand hygiene technique   Outcome: Progressing  Goal: Absence of fever/infection during neutropenic period  Description  INTERVENTIONS:  - Monitor WBC    Outcome: Progressing     Problem: SAFETY ADULT  Goal: Patient will remain free of falls  Description  INTERVENTIONS:  - Assess patient frequently for physical needs  -  Identify cognitive and physical deficits and behaviors that affect risk of falls    -  [place] fall precautions as indicated by assessment   - Educate patient/family on patient safety including physical limitations  - Instruct patient to call for assistance with activity based on assessment  - Modify environment to reduce risk of injury   Outcome: Progressing  Goal: Maintain or return to baseline ADL function  Description  INTERVENTIONS:  -  Assess patient's ability to carry out ADLs; assess patient's baseline for ADL function and identify physical deficits which impact ability to perform ADLs (bathing, care of mouth/teeth, toileting, grooming, dressing, etc )  - Assess/evaluate cause of self-care deficits   - Assess range of motion  - Assess patient's mobility; develop plan if impaired  - Assess patient's need for assistive devices and provide as appropriate  - Encourage maximum independence but intervene and supervise when necessary  - Involve family in performance of ADLs  - Assess for home care needs following discharge   - Provide patient education as appropriate   Outcome: Progressing  Goal: Maintain or return mobility status to optimal level  Description  INTERVENTIONS:  - Assess patient's baseline mobility status (ambulation, transfers, stairs, etc )    - Identify cognitive and physical deficits and behaviors that affect mobility  - [place] fall precautions as indicated by assessment  - Instruct patient to call for assistance with activity based on assessment   Outcome: Progressing     Problem: Knowledge Deficit  Goal: Patient/family/caregiver demonstrates understanding of disease process, treatment plan, medications, and discharge instructions  Description  Complete learning assessment and assess knowledge base    Interventions:  - Provide teaching at level of understanding  - Provide teaching via preferred learning methods  Outcome: Progressing

## 2020-01-31 NOTE — QUICK NOTE
Blood pressures 120s-140s/70s-80s over last 24 hours  Last severe range blood pressure 195/123 at 1845  Given stability of her blood pressures, will discontinue magnesium now and continue to closely monitor blood pressures      MD HOME Peters, PGY-3  1/30/2020  8:28 PM

## 2020-01-31 NOTE — DISCHARGE SUMMARY
Discharge Summary - OB/GYN   Michelle Lancaster 29 y o  female MRN: [id number]  Unit/Bed#: -01 Encounter: [id number]      Admission Date: 2020     Discharge Date: 2020    Admitting Diagnosis:   1  Status post repeat low-transverse  section  2  Preeclampsia with severe features    Discharge Diagnosis:   Same    Attending: Joaquina Desir MD    Hospital Course:     Michelle Lancaster is a 29 y o  [id number] at 38w1d wks who was initially admitted for severe range blood pressures  She came to hospital for evaluation due to new onset abdominal pain and vaginal bleeding  She is found to have severe range blood pressures, was treated with IV labetalol, was started on magnesium sulfate for 24 hours  During her hospitalization she was found to have normal blood pressures and was not plan on any antihypertensive  She denied symptoms of preeclampsia during this time  She was discharged on hospital day 2  On day of discharge, she was ambulating and able to reasonably perform all ADLs  She was voiding and had appropriate bowel function  Pain was well controlled  Patient was instructed to follow up with her OB as an outpatient and was given appropriate warnings to call provider if she develops signs of infection or uncontrolled pain  Complications: none apparent    Condition at discharge: stable     Discharge instructions/Information to patient and family:   See after visit summary for information provided to patient and family  Provisions for Follow-Up Care:  See after visit summary for information related to follow-up care and any pertinent home health orders  Disposition: Home    Planned Readmission: No    Discharge Medications: For a complete list of the patient's medications, please refer to her med rec

## 2020-01-31 NOTE — UTILIZATION REVIEW
Notification of Inpatient Admission/Inpatient Authorization Request - pending ref# [id number]    This is a Notification of Inpatient Admission for [address]  Be advised that this patient was admitted to our facility under Inpatient Status  Contact Emiliano Fritz at [phone] for additional admission information  [facility] DEPT  DEDICATED [phone]  Patient Name:   Ida Jiménez   YOB: 1985       [address]   [address]:   [address]  Tax ID: [id number]  NPI: [id number] Attending Provider/NPI: Jeremy Anaya Md [[id number]] Attending Physician:  RYAN Arriola  Specialty- Obstetrics and Gynecology  [facility] ID- [id number]  [address] [place] [address]  Phone 1: [phone]  Fax: [phone]   Place of Service Code: 24     Place of Service Name:  [address] [place] [address]   Start Date: 1/29/20 1917     Discharge Date & Time: 1/31/2020 11:40 AM    Type of Admission: Inpatient Status Discharge Disposition   (if discharged): Home/Self Care   Patient Diagnoses: Vaginal bleeding [N93 9]  Abdominal pain [R10 9]  Bleeding behind the abdominal cavity [R58]  Postpartum hypertension [O16 5]  Post-operative complication [J06  9XXA]  Left lower quadrant abdominal pain [R10 32]     Orders: Admission Orders (From admission, onward)     Ordered        01/29/20 1917  Inpatient Admission  Once         01/29/20 1841  Place in Observation  Once                    Assigned Utilization Review Contact: Emiliano Fritz  Utilization   Network Utilization Review Department  Phone: [phone]; Fax [phone]  Email: [email or web address]   ATTENTION PAYERS: Please call the assigned Utilization  directly with any questions or concerns ALL voicemails in the department are confidential  Send all requests for admission clinical reviews, approved or denied determinations and any other requests to dedicated fax number belonging to the campus where the patient is receiving treatment

## 2020-01-31 NOTE — PROGRESS NOTES
Progress Note - OB/GYN   Henry Montelongo 29 y o  female MRN: [id number]  Unit/Bed#:  330-01 Encounter: [id number]    Assessment:  Post partum Day #16 s/p RLTCS, stable, baby in room    Plan:  1) preeclampsia with severe features   BP 100s-140s/50s-80s   Asymptomatic  2) abdominal pain   Improved, primarily left to palpation  3) Continue routine post partum care   Encourage ambulation   Encourage breastfeeding   Anticipate discharge today     Subjective/Objective   Chief Complaint:     Post delivery  Patient is doing well  Lochia WNL  Pain well controlled  Subjective:     Pain: yes, cramping, improved with meds  Tolerating PO: yes  Voiding: yes  Flatus: yes  Ambulating: yes  Chest pain: no  Shortness of breath: no  Leg pain: no  Lochia: minimal    Objective:     Vitals: /64 (BP Location: Left arm)   Pulse 62   Temp 98 7 °F (37 1 °C) (Temporal)   Resp 18   LMP 04/23/2019   SpO2 97%       Intake/Output Summary (Last 24 hours) at 1/31/2020 0744  Last data filed at 1/30/2020 2000  Gross per 24 hour   Intake 1562 92 ml   Output 3400 ml   Net -1837 08 ml       Lab Results   Component Value Date    WBC 7 38 01/30/2020    HGB 11 7 01/30/2020    HCT 37 7 01/30/2020    MCV 87 01/30/2020     01/30/2020       Physical Exam:     Gen: AAOx3, NAD  CV: RRR  Lungs: CTA b/l  Abd: Soft, tender to palpation the left lower quadrant, non-distended, no rebound or guarding  No signs of ecchymoses  Uterine fundus firm and non-tender, 1 cm below the umbilicus     Ext: Non tender    Arcadio Boo MD  1/31/2020  7:44 AM

## 2020-01-31 NOTE — LACTATION NOTE
Met with Natan and her baby girl who is two weeks old  Mom readmitted for elevated BP  Mom has concerns about her supply  States she feeds her daughter on demand and pumps afterwards, was getting 6-8 oz and is now getting about 4 oz of pumped milk after feedings  Discussed infant nutritional needs, using output goals and growth as a guild, and enc her to continue to demand feed and decrease pumping to once per day if engorged  Enc her to pump for only 15 min using massage and expression modes appropriately as well as hands on pumping  Pumping band provided  Baby latched very well at this time in cross cradle and football positions  Demonstrated proper alignment at the breast and deep latch  Mom states she can tell when baby is not latched deeply  Enc her to always relatch baby if she does not feel confident with the latch  Ext provided and enc her to call for lactation support as needed throughout her stay

## 2020-02-03 ENCOUNTER — TELEPHONE (OUTPATIENT)
Dept: OBGYN CLINIC | Facility: CLINIC | Age: 35
End: 2020-02-03

## 2020-02-10 ENCOUNTER — POSTPARTUM VISIT (OUTPATIENT)
Dept: OBGYN CLINIC | Facility: CLINIC | Age: 35
End: 2020-02-10

## 2020-02-10 VITALS — WEIGHT: 194 LBS | SYSTOLIC BLOOD PRESSURE: 120 MMHG | DIASTOLIC BLOOD PRESSURE: 70 MMHG | BODY MASS INDEX: 31.31 KG/M2

## 2020-02-10 DIAGNOSIS — Z01.30 BLOOD PRESSURE CHECK: Primary | ICD-10-CM

## 2020-02-10 PROCEDURE — 99024 POSTOP FOLLOW-UP VISIT: CPT | Performed by: NURSE PRACTITIONER

## 2020-02-10 NOTE — PROGRESS NOTES
Assessment/Plan:      Diagnoses and all orders for this visit:    Blood pressure check        Blood pressure check  Blood pressure WNL today  Pt to continue to monitor for s/sx of preeclampsia and call office if experiencing  Pt to continue monitoring BP at home BID  Call with any elevated blood pressure readings  Has postpartum appointment scheduled with Dr Christine Henriquez on Monday 2/17/2020  Subjective:     Patient ID: Suyapa Dockery is a 29 y o  female  Pt presents today for a one week blood pressure check  Pt was discharged about a week ago after postpartum preeclampsia  Pt did not require any anti-hypertensives upon discharge  After course of Magnesium her blood pressure has been regulated without any medication  Pt states she has been checking her blood pressures at home and they have been 120's/70's  Denies any s/sx of preeclampsia  Aware of what to look out for  Edema has resolved  Pt states feeling great  Pain well controlled  Did not even need Tylenol today  Review of Systems   All other systems reviewed and are negative  Objective:     Physical Exam   Constitutional: She is oriented to person, place, and time  She appears well-developed and well-nourished  Cardiovascular: Normal rate, regular rhythm and normal heart sounds  Pulmonary/Chest: Effort normal and breath sounds normal    Abdominal: Soft  Bowel sounds are normal        Musculoskeletal: Normal range of motion  Neurological: She is alert and oriented to person, place, and time  Skin: Skin is warm and dry  Psychiatric: She has a normal mood and affect   Her behavior is normal  Judgment and thought content normal

## 2020-02-10 NOTE — ASSESSMENT & PLAN NOTE
Blood pressure WNL today  Pt to continue to monitor for s/sx of preeclampsia and call office if experiencing  Pt to continue monitoring BP at home BID  Call with any elevated blood pressure readings  Has postpartum appointment scheduled with Dr Risa Elena on Monday 2/17/2020

## 2020-02-17 ENCOUNTER — TELEPHONE (OUTPATIENT)
Dept: OBGYN CLINIC | Facility: CLINIC | Age: 35
End: 2020-02-17

## 2020-02-18 ENCOUNTER — TELEPHONE (OUTPATIENT)
Dept: OBGYN CLINIC | Facility: CLINIC | Age: 35
End: 2020-02-18

## 2020-03-02 ENCOUNTER — POSTPARTUM VISIT (OUTPATIENT)
Dept: OBGYN CLINIC | Facility: CLINIC | Age: 35
End: 2020-03-02

## 2020-03-02 VITALS — BODY MASS INDEX: 31.09 KG/M2 | WEIGHT: 192.6 LBS | SYSTOLIC BLOOD PRESSURE: 112 MMHG | DIASTOLIC BLOOD PRESSURE: 60 MMHG

## 2020-03-02 PROCEDURE — 99024 POSTOP FOLLOW-UP VISIT: CPT | Performed by: OBSTETRICS & GYNECOLOGY

## 2020-03-05 NOTE — PROGRESS NOTES
Postpartum Visit: Patient here for postpartum visit  She is 6 weeks post partum following a repeat LTCS followed by pp readmission for preeclampsia  I have fully reviewed the prenatal and intrapartum course  The delivery was at 45 gestational weeks  Outcome: RCS  Anesthesia: spinal   Postpartum course has been complicated by readmission for preeclampsia  Baby's course has been doing well without problems  Baby is feeding breast   Patient is tolerating regular diet, Bleeding thin lochia  Bowel function is normal  Bladder function is normal  Patient is not sexually active  Contraception method is using condoms for now, considering tubal or vasectomy, fully counseled    Postpartum depression screening: negative  EPDS 0    Physical:   Vitals:    20 1800   BP: 112/60       Objective     /60 (BP Location: Right arm, Patient Position: Sitting, Cuff Size: Standard)   Wt 87 4 kg (192 lb 9 6 oz)   LMP 2019   BMI 31 09 kg/m²   General appearance: alert and oriented, in no acute distress  Lungs: clear to auscultation bilaterally  Heart: regular rate and rhythm, S1, S2 normal, no murmur, click, rub or gallop  Abdomen: soft, non-tender; bowel sounds normal; no masses,  no organomegaly incision clean dry intact healing well  Pelvic: external genitalia normal, vagina normal without discharge, uterus normal size, shape, and consistency, no cervical motion tenderness and no adnexal masses or tenderness        Assessment 33 y/o  s/p repeat c/s steadily recovering, bp back to normal had pp preeclampsia        Plan: considering vas versus tubal, counseled, using condoms for now, return for annual or sooner as needed, prior pap abnormal

## 2020-03-13 LAB — PLACENTA IN STORAGE: NORMAL

## 2020-05-04 ENCOUNTER — TELEPHONE (OUTPATIENT)
Dept: OBGYN CLINIC | Facility: CLINIC | Age: 35
End: 2020-05-04

## 2020-05-04 DIAGNOSIS — R10.2 PELVIC PAIN: Primary | ICD-10-CM

## 2020-05-15 ENCOUNTER — HOSPITAL ENCOUNTER (EMERGENCY)
Facility: HOSPITAL | Age: 35
Discharge: HOME | End: 2020-05-15
Attending: EMERGENCY MEDICINE | Admitting: EMERGENCY MEDICINE
Payer: COMMERCIAL

## 2020-05-15 VITALS
WEIGHT: 180 LBS | OXYGEN SATURATION: 97 % | SYSTOLIC BLOOD PRESSURE: 139 MMHG | RESPIRATION RATE: 20 BRPM | DIASTOLIC BLOOD PRESSURE: 68 MMHG | BODY MASS INDEX: 29.05 KG/M2 | HEART RATE: 82 BPM | TEMPERATURE: 98.1 F

## 2020-05-15 DIAGNOSIS — N39.0 UTI (URINARY TRACT INFECTION): Primary | ICD-10-CM

## 2020-05-15 LAB
BACTERIA UR QL AUTO: ABNORMAL /HPF
BILIRUB UR QL STRIP: NEGATIVE
CLARITY UR: ABNORMAL
COLOR UR: ABNORMAL
EXT PREG TEST URINE: NEGATIVE
EXT. CONTROL ED NAV: NORMAL
GLUCOSE UR STRIP-MCNC: NEGATIVE MG/DL
HGB UR QL STRIP.AUTO: ABNORMAL
KETONES UR STRIP-MCNC: NEGATIVE MG/DL
LEUKOCYTE ESTERASE UR QL STRIP: ABNORMAL
NITRITE UR QL STRIP: NEGATIVE
NON-SQ EPI CELLS URNS QL MICRO: ABNORMAL /HPF
PH UR STRIP.AUTO: 8 [PH]
PROT UR STRIP-MCNC: ABNORMAL MG/DL
RBC #/AREA URNS AUTO: ABNORMAL /HPF
SP GR UR STRIP.AUTO: 1.02 (ref 1–1.03)
UROBILINOGEN UR QL STRIP.AUTO: 1 E.U./DL
WBC #/AREA URNS AUTO: ABNORMAL /HPF

## 2020-05-15 PROCEDURE — 99284 EMERGENCY DEPT VISIT MOD MDM: CPT

## 2020-05-15 PROCEDURE — 81025 URINE PREGNANCY TEST: CPT | Performed by: EMERGENCY MEDICINE

## 2020-05-15 PROCEDURE — 81001 URINALYSIS AUTO W/SCOPE: CPT | Performed by: EMERGENCY MEDICINE

## 2020-05-15 PROCEDURE — 99284 EMERGENCY DEPT VISIT MOD MDM: CPT | Performed by: EMERGENCY MEDICINE

## 2020-05-15 RX ORDER — CEPHALEXIN 500 MG/1
500 CAPSULE ORAL ONCE
Status: COMPLETED | OUTPATIENT
Start: 2020-05-15 | End: 2020-05-15

## 2020-05-15 RX ORDER — PHENAZOPYRIDINE HYDROCHLORIDE 200 MG/1
200 TABLET, FILM COATED ORAL 3 TIMES DAILY
Qty: 6 TABLET | Refills: 0 | Status: SHIPPED | OUTPATIENT
Start: 2020-05-15

## 2020-05-15 RX ORDER — PHENAZOPYRIDINE HYDROCHLORIDE 100 MG/1
200 TABLET, FILM COATED ORAL ONCE
Status: COMPLETED | OUTPATIENT
Start: 2020-05-15 | End: 2020-05-15

## 2020-05-15 RX ORDER — CEPHALEXIN 500 MG/1
500 CAPSULE ORAL EVERY 6 HOURS SCHEDULED
Qty: 28 CAPSULE | Refills: 0 | Status: SHIPPED | OUTPATIENT
Start: 2020-05-15 | End: 2020-05-22

## 2020-05-15 RX ADMIN — CEPHALEXIN 500 MG: 500 CAPSULE ORAL at 20:34

## 2020-05-15 RX ADMIN — CEPHALEXIN 500 MG: 500 CAPSULE ORAL at 19:33

## 2020-05-15 RX ADMIN — PHENAZOPYRIDINE 200 MG: 100 TABLET ORAL at 19:33

## 2020-07-02 ENCOUNTER — OFFICE VISIT (OUTPATIENT)
Dept: OBGYN CLINIC | Facility: CLINIC | Age: 35
End: 2020-07-02
Payer: COMMERCIAL

## 2020-07-02 ENCOUNTER — APPOINTMENT (OUTPATIENT)
Dept: RADIOLOGY | Facility: CLINIC | Age: 35
End: 2020-07-02
Payer: COMMERCIAL

## 2020-07-02 VITALS
TEMPERATURE: 96.7 F | HEIGHT: 67 IN | WEIGHT: 160 LBS | SYSTOLIC BLOOD PRESSURE: 93 MMHG | BODY MASS INDEX: 25.11 KG/M2 | DIASTOLIC BLOOD PRESSURE: 64 MMHG | HEART RATE: 62 BPM

## 2020-07-02 DIAGNOSIS — M25.561 ACUTE PAIN OF RIGHT KNEE: Primary | ICD-10-CM

## 2020-07-02 DIAGNOSIS — M25.561 PAIN IN BOTH KNEES, UNSPECIFIED CHRONICITY: ICD-10-CM

## 2020-07-02 DIAGNOSIS — M25.562 PAIN IN BOTH KNEES, UNSPECIFIED CHRONICITY: ICD-10-CM

## 2020-07-02 PROCEDURE — 99203 OFFICE O/P NEW LOW 30 MIN: CPT | Performed by: ORTHOPAEDIC SURGERY

## 2020-07-02 PROCEDURE — 73562 X-RAY EXAM OF KNEE 3: CPT

## 2020-07-02 NOTE — PROGRESS NOTES
Assessment/Plan:  1  Acute pain of right knee  XR knee 3 vw right non injury    XR knee 3 vw left non injury    MRI knee right  wo contrast     Natan has acute right-sided knee pain concerning for possible medial meniscus versus MCL tear  I would like for her to have an MRI of the right knee  She also has bilateral patellofemoral syndrome which could relate to a lot of her pain however I would like to rule out tear before discussing patellofemoral therapy and bracing going forward  She will follow up after the MRI is complete  Subjective:   Shant Do is a 28 y o  female who presents to the office for evaluation for bilateral knee pain  She states that both knees have been bothering her for several months  It also seemed to start when she was pregnant having increased discomfort and clicking and popping within her knees  She states that she injured her right knee in March of 2020 when she was defending a small dog from 3 large pit bulls  She twisted her knee at that time and felt a pop  She had significant swelling for few days which improved  She states that the knee has bothered her more severely over the past few months  She did not come in for initial evaluation  She states the swelling has gone down but she has felt persistent discomfort over the anterior medial aspect of the right knee  It does feel like it is unstable and she did feel a pop in her knee going up the stairs recently that caused her to fall  She did not report increased swelling or additional pain afterwards but this seem to be aggravating her knee  She decided to come in for evaluation  She also has pain in the left knee that is aching and throbbing and intermittent not as quite as severe over the anterior portion the knee  It does click and pop going up and down stairs  Review of Systems   Constitutional: Negative for chills, fever and unexpected weight change     HENT: Negative for hearing loss, nosebleeds and sore throat  Eyes: Negative for pain, redness and visual disturbance  Respiratory: Negative for cough, shortness of breath and wheezing  Cardiovascular: Negative for chest pain, palpitations and leg swelling  Gastrointestinal: Negative for abdominal pain, nausea and vomiting  Endocrine: Negative for polydipsia and polyuria  Genitourinary: Negative for dysuria and hematuria  Musculoskeletal:        See HPI   Skin: Negative for rash and wound  Neurological: Negative for dizziness, numbness and headaches  Psychiatric/Behavioral: Negative for decreased concentration and suicidal ideas  The patient is not nervous/anxious  Past Medical History:   Diagnosis Date    Anxiety     Chronic pain     left hip    Ectopic pregnancy with intrauterine pregnancy      and     Hashimoto's disease     Hashimoto's thyroiditis     IBS (irritable bowel syndrome)     Pituitary tumor     Polycystic ovarian syndrome     Varicella        Past Surgical History:   Procedure Laterality Date     SECTION      DERMOID CYST  EXCISION      ECTOPIC PREGNANCY SURGERY      [place]  DELIVERY ONLY N/A 1/15/2020    Procedure:  SECTION () REPEAT;  Surgeon: Marlene Garsia MD;  Location: [facility];  Service: Obstetrics       Family History   Problem Relation Age of Onset    Diabetes Brother     Heart disease Maternal Grandmother        Social History     Occupational History    Not on file   Tobacco Use    Smoking status: Current Every Day Smoker     Packs/day: 0 25     Types: Cigarettes    Smokeless tobacco: Never Used   Substance and Sexual Activity    Alcohol use: Not Currently    Drug use: Yes     Types:  Other     Comment: was on methadone for pain management over 1 year ago    Sexual activity: Yes     Partners: Male     Birth control/protection: None         Current Outpatient Medications:     SYNTHROID 200 MCG tablet, Take 300 mcg by mouth daily , Disp: , Rfl: 0   lidocaine (LIDODERM) 5 %, Apply 1 patch topically daily Remove & Discard patch within 12 hours or as directed by MD cut to size of affected area (Patient not taking: Reported on 2/10/2020), Disp: 15 patch, Rfl: 0    phenazopyridine (PYRIDIUM) 200 mg tablet, Take 1 tablet (200 mg total) by mouth 3 (three) times a day (Patient not taking: Reported on 7/2/2020), Disp: 6 tablet, Rfl: 0    Allergies   Allergen Reactions    Demerol [Meperidine] Other (See Comments)     "hot and trouble breathing"    Latex Rash    Lyrica [Pregabalin] Other (See Comments)     "suicidal thoughts"    Morphine And Related Rash    Other      Adhesive tape       Objective:  Vitals:    07/02/20 1438   BP: 93/64   Pulse: 62   Temp: (!) 96 7 °F (35 9 °C)       Right Knee Exam     Muscle Strength   The patient has normal right knee strength  Tenderness   The patient is experiencing tenderness in the medial joint line, MCL, patella and patellar tendon  Range of Motion   Extension: normal   Flexion: normal     Tests   Sendy:  Medial - positive Lateral - negative  Varus: negative Valgus: positive  Lachman:  Anterior - negative      Drawer:  Anterior - negative    Posterior - negative    Other   Erythema: absent  Sensation: normal  Pulse: present  Swelling: none  Effusion: no effusion present    Comments:  Positive patellar grind test      Left Knee Exam     Muscle Strength   The patient has normal left knee strength  Tenderness   The patient is experiencing tenderness in the patellar tendon and patella  Range of Motion   Extension: normal   Flexion: normal     Tests   Sendy:  Medial - negative Lateral - negative  Varus: negative Valgus: negative  Lachman:  Anterior - negative      Drawer:  Anterior - negative     Posterior - negative    Other   Erythema: absent  Sensation: normal  Pulse: present  Swelling: none    Comments:  Positive patellar grind test          Observations     Right Knee   Negative for effusion         Physical Exam   Constitutional: She is oriented to person, place, and time  She appears well-developed and well-nourished  HENT:   Head: Normocephalic and atraumatic  Eyes: Pupils are equal, round, and reactive to light  Conjunctivae are normal    Neck: Normal range of motion  Neck supple  Cardiovascular: Normal rate and intact distal pulses  Pulmonary/Chest: Effort normal  No respiratory distress  Musculoskeletal:        Right knee: She exhibits no effusion  As noted in HPI   Neurological: She is alert and oriented to person, place, and time  Skin: Skin is warm and dry  Psychiatric: She has a normal mood and affect  Her behavior is normal    Nursing note and vitals reviewed  I have personally reviewed pertinent films in PACS and my interpretation is as follows: Three-view x-rays of the right knee in the office today demonstrates no evidence of acute fracture  Three-view x-rays of the left knee in the office today demonstrates no evidence of acute fracture

## 2020-07-16 ENCOUNTER — HOSPITAL ENCOUNTER (OUTPATIENT)
Dept: RADIOLOGY | Facility: HOSPITAL | Age: 35
Discharge: HOME | End: 2020-07-16
Attending: ORTHOPAEDIC SURGERY
Payer: COMMERCIAL

## 2020-07-16 DIAGNOSIS — M25.561 ACUTE PAIN OF RIGHT KNEE: ICD-10-CM

## 2020-07-16 PROCEDURE — 73721 MRI JNT OF LWR EXTRE W/O DYE: CPT

## 2020-07-21 ENCOUNTER — TELEPHONE (OUTPATIENT)
Dept: OBGYN CLINIC | Facility: CLINIC | Age: 35
End: 2020-07-21

## 2020-07-21 NOTE — TELEPHONE ENCOUNTER
----- Message from Chanda Valle DO sent at 7/20/2020  4:24 PM EDT -----  Natan had an MRI of her right knee  It showed a complex tear of her medial meniscus    I would recommend she follow up with Dr David Rodriguez in our office to discuss surgical and nonsurgical options

## 2020-07-24 ENCOUNTER — TELEPHONE (OUTPATIENT)
Dept: OBGYN CLINIC | Facility: CLINIC | Age: 35
End: 2020-07-24

## 2020-08-12 ENCOUNTER — OFFICE VISIT (OUTPATIENT)
Dept: OBGYN CLINIC | Facility: CLINIC | Age: 35
End: 2020-08-12
Payer: COMMERCIAL

## 2020-08-12 ENCOUNTER — APPOINTMENT (OUTPATIENT)
Dept: LAB | Facility: HOSPITAL | Age: 35
End: 2020-08-12
Attending: ORTHOPAEDIC SURGERY
Payer: COMMERCIAL

## 2020-08-12 ENCOUNTER — TRANSCRIBE ORDERS (OUTPATIENT)
Dept: ADMINISTRATIVE | Facility: HOSPITAL | Age: 35
End: 2020-08-12

## 2020-08-12 VITALS
WEIGHT: 163.6 LBS | SYSTOLIC BLOOD PRESSURE: 110 MMHG | HEART RATE: 80 BPM | TEMPERATURE: 97.5 F | DIASTOLIC BLOOD PRESSURE: 70 MMHG | HEIGHT: 67 IN | BODY MASS INDEX: 25.68 KG/M2

## 2020-08-12 DIAGNOSIS — Z01.818 PRE-OP TESTING: ICD-10-CM

## 2020-08-12 DIAGNOSIS — S83.241A OTHER TEAR OF MEDIAL MENISCUS, CURRENT INJURY, RIGHT KNEE, INITIAL ENCOUNTER: ICD-10-CM

## 2020-08-12 DIAGNOSIS — S83.241A OTHER TEAR OF MEDIAL MENISCUS, CURRENT INJURY, RIGHT KNEE, INITIAL ENCOUNTER: Primary | ICD-10-CM

## 2020-08-12 DIAGNOSIS — Z01.818 PRE-OP TESTING: Primary | ICD-10-CM

## 2020-08-12 LAB
ANION GAP SERPL CALCULATED.3IONS-SCNC: 9 MMOL/L (ref 4–13)
BASOPHILS # BLD AUTO: 0.07 THOUSANDS/ΜL (ref 0–0.1)
BASOPHILS NFR BLD AUTO: 1 % (ref 0–1)
BUN SERPL-MCNC: 5 MG/DL (ref 5–25)
CALCIUM SERPL-MCNC: 8.2 MG/DL (ref 8.3–10.1)
CHLORIDE SERPL-SCNC: 103 MMOL/L (ref 100–108)
CO2 SERPL-SCNC: 28 MMOL/L (ref 21–32)
CREAT SERPL-MCNC: 0.78 MG/DL (ref 0.6–1.3)
EOSINOPHIL # BLD AUTO: 0.21 THOUSAND/ΜL (ref 0–0.61)
EOSINOPHIL NFR BLD AUTO: 3 % (ref 0–6)
ERYTHROCYTE [DISTWIDTH] IN BLOOD BY AUTOMATED COUNT: 13.2 % (ref 11.6–15.1)
GFR SERPL CREATININE-BSD FRML MDRD: 99 ML/MIN/1.73SQ M
GLUCOSE P FAST SERPL-MCNC: 87 MG/DL (ref 65–99)
HCT VFR BLD AUTO: 42.7 % (ref 34.8–46.1)
HGB BLD-MCNC: 14 G/DL (ref 11.5–15.4)
IMM GRANULOCYTES # BLD AUTO: 0.02 THOUSAND/UL (ref 0–0.2)
IMM GRANULOCYTES NFR BLD AUTO: 0 % (ref 0–2)
LYMPHOCYTES # BLD AUTO: 1.72 THOUSANDS/ΜL (ref 0.6–4.47)
LYMPHOCYTES NFR BLD AUTO: 22 % (ref 14–44)
MCH RBC QN AUTO: 30.2 PG (ref 26.8–34.3)
MCHC RBC AUTO-ENTMCNC: 32.8 G/DL (ref 31.4–37.4)
MCV RBC AUTO: 92 FL (ref 82–98)
MONOCYTES # BLD AUTO: 0.41 THOUSAND/ΜL (ref 0.17–1.22)
MONOCYTES NFR BLD AUTO: 5 % (ref 4–12)
NEUTROPHILS # BLD AUTO: 5.55 THOUSANDS/ΜL (ref 1.85–7.62)
NEUTS SEG NFR BLD AUTO: 69 % (ref 43–75)
NRBC BLD AUTO-RTO: 0 /100 WBCS
PLATELET # BLD AUTO: 302 THOUSANDS/UL (ref 149–390)
PMV BLD AUTO: 11.1 FL (ref 8.9–12.7)
POTASSIUM SERPL-SCNC: 3.6 MMOL/L (ref 3.5–5.3)
RBC # BLD AUTO: 4.63 MILLION/UL (ref 3.81–5.12)
SODIUM SERPL-SCNC: 140 MMOL/L (ref 136–145)
WBC # BLD AUTO: 7.98 THOUSAND/UL (ref 4.31–10.16)

## 2020-08-12 PROCEDURE — 99244 OFF/OP CNSLTJ NEW/EST MOD 40: CPT | Performed by: ORTHOPAEDIC SURGERY

## 2020-08-12 PROCEDURE — U0003 INFECTIOUS AGENT DETECTION BY NUCLEIC ACID (DNA OR RNA); SEVERE ACUTE RESPIRATORY SYNDROME CORONAVIRUS 2 (SARS-COV-2) (CORONAVIRUS DISEASE [COVID-19]), AMPLIFIED PROBE TECHNIQUE, MAKING USE OF HIGH THROUGHPUT TECHNOLOGIES AS DESCRIBED BY CMS-2020-01-R: HCPCS | Performed by: ORTHOPAEDIC SURGERY

## 2020-08-12 PROCEDURE — 85025 COMPLETE CBC W/AUTO DIFF WBC: CPT

## 2020-08-12 PROCEDURE — 80048 BASIC METABOLIC PNL TOTAL CA: CPT

## 2020-08-12 PROCEDURE — 36415 COLL VENOUS BLD VENIPUNCTURE: CPT

## 2020-08-12 RX ORDER — NAPROXEN 500 MG/1
500 TABLET ORAL 2 TIMES DAILY WITH MEALS
COMMUNITY
Start: 2020-07-01

## 2020-08-12 RX ORDER — LEVOTHYROXINE SODIUM 125 UG/1
250 TABLET ORAL DAILY
COMMUNITY
Start: 2020-07-03

## 2020-08-12 NOTE — PROGRESS NOTES
Assessment/Plan:  1  Other tear of medial meniscus, current injury, right knee, initial encounter  Crutches    Ambulatory referral to Physical Therapy    PAT Covid Screening       Scribe Attestation    I,:   Rosalion Tesfaye am acting as a scribe while in the presence of the attending physician :        I,:   Mali Treviño MD personally performed the services described in this documentation    as scribed in my presence :              Natan upon examination and review the MRI of the right knee does demonstrate a complex tear to the medial meniscus posterior horn  I did explain to her that the findings on the MRI as well as her clinical exam does qualify her for a right knee arthroscopy with medial meniscectomy  I did discuss the procedure at length with her today as well as the risks including but not limited to bleeding, infection, blood clot, nerve injury resulting in weakness, numbness, pain, increased pain, stiffness, recurrent injury, failure surgery, and need for further surgery  Leesuad did verbalize understanding to all the information provided to her today, and did provide signed consent for a right knee arthroscopy with medial meniscectomy  She will be fit with crutches today, and will meet with my surgical scheduler to set up a date and time as per my schedule to have the surgical procedure completed  Natan may continue with activities of daily living as tolerated  I will see her back on the date of her surgery  Subjective:   Сергей Velasquez is a 28 y o  female who presents to the office today for initial evaluation of her right knee  She is referred to us today by Dr Ishmael Rodriguez  She remarks that she has had activity related pain bilaterally for over 1 year  Unfortunately she states that her pain has been exacerbated since March 2020 when she was involved in an incident with multiple pit bulls attacking a smaller dog    She notes that she made multiple attempts of kicking her dogs and notes that she had significant pain and swelling to her knees following the incident  She states that her pain is a persistent moderate to severe sharp pain anteriorly and medially bilaterally  She notes that her right knee is worse than her left  She states that weight-bearing activities exacerbates her painful symptoms, and notes she has less painful while at rest   However she does experience start-up pain if she is seated for long period time  She also notes that ascending and descending stairs will exacerbate her painful symptoms  She does experience intermittent clicking into the knees that can be painful at times  Today does remark on mild decreased sensation into the right lower extremity versus the left  She remarks that she has been trying to self treat with home exercises over the past year however these attempts have not provided her with any relief  Review of Systems   Constitutional: Negative for activity change, chills, fever and unexpected weight change  HENT: Negative for hearing loss, nosebleeds and sore throat  Eyes: Negative for pain, redness and visual disturbance  Respiratory: Negative for cough, shortness of breath and wheezing  Cardiovascular: Negative for chest pain, palpitations and leg swelling  Gastrointestinal: Negative for abdominal pain, nausea and vomiting  Endocrine: Negative for polydipsia and polyuria  Genitourinary: Negative for dysuria and hematuria  Musculoskeletal: Positive for arthralgias, joint swelling and myalgias  See HPI   Skin: Negative for rash and wound  Neurological: Negative for dizziness, numbness and headaches  Psychiatric/Behavioral: Negative for decreased concentration and suicidal ideas  The patient is not nervous/anxious            Past Medical History:   Diagnosis Date    Anxiety     Chronic pain     left hip    Ectopic pregnancy with intrauterine pregnancy     2009 and 2011    Hashimoto's disease     Hashimoto's thyroiditis     IBS (irritable bowel syndrome)     Pituitary tumor     Polycystic ovarian syndrome     Varicella        Past Surgical History:   Procedure Laterality Date     SECTION      DERMOID CYST  EXCISION      ECTOPIC PREGNANCY SURGERY      [place]  DELIVERY ONLY N/A 1/15/2020    Procedure:  SECTION () REPEAT;  Surgeon: Serena Barcenas MD;  Location: [facility] ;  Service: Obstetrics       Family History   Problem Relation Age of Onset    Diabetes Brother     Heart disease Maternal Grandmother        Social History     Occupational History    Not on file   Tobacco Use    Smoking status: Current Every Day Smoker     Packs/day: 0 25     Types: Cigarettes    Smokeless tobacco: Never Used   Substance and Sexual Activity    Alcohol use: Not Currently    Drug use: Yes     Types:  Other     Comment: was on methadone for pain management over 1 year ago    Sexual activity: Yes     Partners: Male     Birth control/protection: None         Current Outpatient Medications:     Euthyrox 125 MCG tablet, Take 250 mcg by mouth daily, Disp: , Rfl:     naproxen (NAPROSYN) 500 mg tablet, Take 500 mg by mouth 2 (two) times a day with meals, Disp: , Rfl:     lidocaine (LIDODERM) 5 %, Apply 1 patch topically daily Remove & Discard patch within 12 hours or as directed by MD cut to size of affected area (Patient not taking: Reported on 2/10/2020), Disp: 15 patch, Rfl: 0    phenazopyridine (PYRIDIUM) 200 mg tablet, Take 1 tablet (200 mg total) by mouth 3 (three) times a day (Patient not taking: Reported on 2020), Disp: 6 tablet, Rfl: 0    Allergies   Allergen Reactions    Demerol [Meperidine] Other (See Comments)     "hot and trouble breathing"    Lactose GI Intolerance    Latex Rash    Lyrica [Pregabalin] Other (See Comments)     "suicidal thoughts"    Morphine And Related Rash    Tramadol      "I never had a reaction to tramadol"    Ketorolac Nausea Only     Upsets her stomach, "irritates my colon"    Other      Adhesive tape       Objective:  Vitals:    08/12/20 1143   BP: 110/70   Pulse: 80   Temp: 97 5 °F (36 4 °C)       Right Knee Exam     Tenderness   The patient is experiencing tenderness in the medial joint line  Range of Motion   Extension: 0 (Painful at end range)   Flexion: 120 (Painful and range)     Tests   Sendy:  Medial - positive   Varus: negative Valgus: negative  Drawer:  Anterior - negative    Posterior - negative    Other   Erythema: absent  Scars: absent  Sensation: normal  Pulse: present  Effusion: effusion (Trace) present          Observations     Right Knee   Positive for effusion (Trace)  Physical Exam  Vitals signs reviewed  Constitutional:       Appearance: She is well-developed  HENT:      Head: Normocephalic and atraumatic  Eyes:      General:         Right eye: No discharge  Left eye: No discharge  Conjunctiva/sclera: Conjunctivae normal    Neck:      Musculoskeletal: Normal range of motion and neck supple  Cardiovascular:      Rate and Rhythm: Normal rate  Heart sounds: Normal heart sounds  Pulmonary:      Effort: Pulmonary effort is normal  No respiratory distress  Breath sounds: Normal breath sounds  Musculoskeletal:      Right knee: She exhibits effusion (Trace)  Comments: As noted in HPI   Skin:     General: Skin is warm and dry  Neurological:      Mental Status: She is alert and oriented to person, place, and time  Psychiatric:         Behavior: Behavior normal          Thought Content: Thought content normal          Judgment: Judgment normal          I have personally reviewed pertinent films in PACS and my interpretation is as follows:    MRI of the right knee demonstrates a complex tear to the medial meniscus posterior horn

## 2020-08-12 NOTE — H&P (VIEW-ONLY)
Assessment/Plan:  1  Other tear of medial meniscus, current injury, right knee, initial encounter  Crutches    Ambulatory referral to Physical Therapy    PAT Covid Screening       Scribe Attestation    I,:   Versa Mortimer am acting as a scribe while in the presence of the attending physician :        I,:   Hawa Araya MD personally performed the services described in this documentation    as scribed in my presence :              Natan upon examination and review the MRI of the right knee does demonstrate a complex tear to the medial meniscus posterior horn  I did explain to her that the findings on the MRI as well as her clinical exam does qualify her for a right knee arthroscopy with medial meniscectomy  I did discuss the procedure at length with her today as well as the risks including but not limited to bleeding, infection, blood clot, nerve injury resulting in weakness, numbness, pain, increased pain, stiffness, recurrent injury, failure surgery, and need for further surgery  Kareemia did verbalize understanding to all the information provided to her today, and did provide signed consent for a right knee arthroscopy with medial meniscectomy  She will be fit with crutches today, and will meet with my surgical scheduler to set up a date and time as per my schedule to have the surgical procedure completed  Natan may continue with activities of daily living as tolerated  I will see her back on the date of her surgery  Subjective:   Chelsea Amezcua is a 28 y o  female who presents to the office today for initial evaluation of her right knee  She is referred to us today by Dr Yana Garcia  She remarks that she has had activity related pain bilaterally for over 1 year  Unfortunately she states that her pain has been exacerbated since March 2020 when she was involved in an incident with multiple pit bulls attacking a smaller dog    She notes that she made multiple attempts of kicking her dogs and notes that she had significant pain and swelling to her knees following the incident  She states that her pain is a persistent moderate to severe sharp pain anteriorly and medially bilaterally  She notes that her right knee is worse than her left  She states that weight-bearing activities exacerbates her painful symptoms, and notes she has less painful while at rest   However she does experience start-up pain if she is seated for long period time  She also notes that ascending and descending stairs will exacerbate her painful symptoms  She does experience intermittent clicking into the knees that can be painful at times  Today does remark on mild decreased sensation into the right lower extremity versus the left  She remarks that she has been trying to self treat with home exercises over the past year however these attempts have not provided her with any relief  Review of Systems   Constitutional: Negative for activity change, chills, fever and unexpected weight change  HENT: Negative for hearing loss, nosebleeds and sore throat  Eyes: Negative for pain, redness and visual disturbance  Respiratory: Negative for cough, shortness of breath and wheezing  Cardiovascular: Negative for chest pain, palpitations and leg swelling  Gastrointestinal: Negative for abdominal pain, nausea and vomiting  Endocrine: Negative for polydipsia and polyuria  Genitourinary: Negative for dysuria and hematuria  Musculoskeletal: Positive for arthralgias, joint swelling and myalgias  See HPI   Skin: Negative for rash and wound  Neurological: Negative for dizziness, numbness and headaches  Psychiatric/Behavioral: Negative for decreased concentration and suicidal ideas  The patient is not nervous/anxious            Past Medical History:   Diagnosis Date    Anxiety     Chronic pain     left hip    Ectopic pregnancy with intrauterine pregnancy     2009 and 2011    Hashimoto's disease     Hashimoto's thyroiditis     IBS (irritable bowel syndrome)     Pituitary tumor     Polycystic ovarian syndrome     Varicella        Past Surgical History:   Procedure Laterality Date     SECTION      DERMOID CYST  EXCISION      ECTOPIC PREGNANCY SURGERY      MD  DELIVERY ONLY N/A 1/15/2020    Procedure:  SECTION () REPEAT;  Surgeon: Linda Salazar MD;  Location: [facility] ;  Service: Obstetrics       Family History   Problem Relation Age of Onset    Diabetes Brother     Heart disease Maternal Grandmother        Social History     Occupational History    Not on file   Tobacco Use    Smoking status: Current Every Day Smoker     Packs/day: 0 25     Types: Cigarettes    Smokeless tobacco: Never Used   Substance and Sexual Activity    Alcohol use: Not Currently    Drug use: Yes     Types:  Other     Comment: was on methadone for pain management over 1 year ago    Sexual activity: Yes     Partners: Male     Birth control/protection: None         Current Outpatient Medications:     Euthyrox 125 MCG tablet, Take 250 mcg by mouth daily, Disp: , Rfl:     naproxen (NAPROSYN) 500 mg tablet, Take 500 mg by mouth 2 (two) times a day with meals, Disp: , Rfl:     lidocaine (LIDODERM) 5 %, Apply 1 patch topically daily Remove & Discard patch within 12 hours or as directed by MD cut to size of affected area (Patient not taking: Reported on 2/10/2020), Disp: 15 patch, Rfl: 0    phenazopyridine (PYRIDIUM) 200 mg tablet, Take 1 tablet (200 mg total) by mouth 3 (three) times a day (Patient not taking: Reported on 2020), Disp: 6 tablet, Rfl: 0    Allergies   Allergen Reactions    Demerol [Meperidine] Other (See Comments)     "hot and trouble breathing"    Lactose GI Intolerance    Latex Rash    Lyrica [Pregabalin] Other (See Comments)     "suicidal thoughts"    Morphine And Related Rash    Tramadol      "I never had a reaction to tramadol"    Ketorolac Nausea Only     Upsets her stomach, "irritates my colon"    Other      Adhesive tape       Objective:  Vitals:    08/12/20 1143   BP: 110/70   Pulse: 80   Temp: 97 5 °F (36 4 °C)       Right Knee Exam     Tenderness   The patient is experiencing tenderness in the medial joint line  Range of Motion   Extension: 0 (Painful at end range)   Flexion: 120 (Painful and range)     Tests   Sendy:  Medial - positive   Varus: negative Valgus: negative  Drawer:  Anterior - negative    Posterior - negative    Other   Erythema: absent  Scars: absent  Sensation: normal  Pulse: present  Effusion: effusion (Trace) present          Observations     Right Knee   Positive for effusion (Trace)  Physical Exam  Vitals signs reviewed  Constitutional:       Appearance: She is well-developed  HENT:      Head: Normocephalic and atraumatic  Eyes:      General:         Right eye: No discharge  Left eye: No discharge  Conjunctiva/sclera: Conjunctivae normal    Neck:      Musculoskeletal: Normal range of motion and neck supple  Cardiovascular:      Rate and Rhythm: Normal rate  Heart sounds: Normal heart sounds  Pulmonary:      Effort: Pulmonary effort is normal  No respiratory distress  Breath sounds: Normal breath sounds  Musculoskeletal:      Right knee: She exhibits effusion (Trace)  Comments: As noted in HPI   Skin:     General: Skin is warm and dry  Neurological:      Mental Status: She is alert and oriented to person, place, and time  Psychiatric:         Behavior: Behavior normal          Thought Content: Thought content normal          Judgment: Judgment normal          I have personally reviewed pertinent films in PACS and my interpretation is as follows:    MRI of the right knee demonstrates a complex tear to the medial meniscus posterior horn

## 2020-08-13 LAB — SARS-COV-2 RNA SPEC QL NAA+PROBE: NOT DETECTED

## 2020-08-13 NOTE — PRE-PROCEDURE INSTRUCTIONS
My Surgical Experience    The following information was developed to assist you to prepare for your operation  What do I need to do before coming to the hospital?   Arrange for a responsible person to drive you to and from the hospital    Arrange care for your children at home  Children are not allowed in the recovery areas of the hospital   Plan to wear clothing that is easy to put on and take off  If you are having shoulder surgery, wear a shirt that buttons or zippers in the front  Bathing  o Shower the evening before and the morning of your surgery with an antibacterial soap  Please refer to the Pre Op Showering Instructions for Surgery Patients Sheet   o Remove nail polish and all body piercing jewelry  o Do not shave any body part for at least 24 hours before surgery-this includes face, arms, legs and upper body  Food  o Nothing to eat or drink after midnight the night before your surgery  This includes candy and chewing gum  o Exception: If your surgery is after 12:00pm (noon), you may have clear liquids such as 7-Up®, ginger ale, apple or cranberry juice, Jell-O®, water, or clear broth until 8:00 am  o Do not drink milk or juice with pulp on the morning before surgery  o Do not drink alcohol 24 hours before surgery  Medicine  o Follow instructions you received from your surgeon about which medicines you may take on the day of surgery  o If instructed to take medicine on the morning of surgery, take pills with just a small sip of water  Call your prescribing doctor for specific infroamtion on what to do if you take insulin    What should I bring to the hospital?    Bring:  Maurice Meraz or a walker, if you have them, for foot or knee surgery   A list of the daily medicines, vitamins, minerals, herbals and nutritional supplements you take   Include the dosages of medicines and the time you take them each day   Glasses, dentures or hearing aids   Minimal clothing; you will be wearing hospital sleepwear   Photo ID; required to verify your identity   If you have a Living Will or Power of , bring a copy of the documents   If you have an ostomy, bring an extra pouch and any supplies you use    Do not bring   Medicines or inhalers   Money, valuables or jewelry    What other information should I know about the day of surgery?  Notify your surgeons if you develop a cold, sore throat, cough, fever, rash or any other illness   Report to the Ambulatory Surgical/Same Day Surgery Unit   You will be instructed to stop at Registration only if you have not been pre-registered   Inform your  fi they do not stay that they will be asked by the staff to leave a phone number where they can be reached   Be available to be reached before surgery  In the event the operating room schedule changes, you may be asked to come in earlier or later than expected    *It is important to tell your doctor and others involved in your health care if you are taking or have been taking any non-prescription drugs, vitamins, minerals, herbals or other nutritional supplements  Any of these may interact with some food or medicines and cause a reaction      Pre-Surgery Instructions:   Medication Instructions    Euthyrox 125 MCG tablet Instructed patient per Anesthesia Guidelines   naproxen (NAPROSYN) 500 mg tablet Instructed patient per Anesthesia Guidelines  To take euthyrox a m  of surgery

## 2020-08-15 ENCOUNTER — ANESTHESIA EVENT (OUTPATIENT)
Dept: PERIOP | Facility: HOSPITAL | Age: 35
End: 2020-08-15
Payer: COMMERCIAL

## 2020-08-17 ENCOUNTER — ANESTHESIA (OUTPATIENT)
Dept: PERIOP | Facility: HOSPITAL | Age: 35
End: 2020-08-17
Payer: COMMERCIAL

## 2020-08-17 ENCOUNTER — HOSPITAL ENCOUNTER (OUTPATIENT)
Facility: HOSPITAL | Age: 35
Setting detail: OUTPATIENT SURGERY
Discharge: HOME | End: 2020-08-17
Attending: ORTHOPAEDIC SURGERY | Admitting: ORTHOPAEDIC SURGERY
Payer: COMMERCIAL

## 2020-08-17 VITALS
DIASTOLIC BLOOD PRESSURE: 69 MMHG | HEIGHT: 66 IN | SYSTOLIC BLOOD PRESSURE: 125 MMHG | BODY MASS INDEX: 26.65 KG/M2 | TEMPERATURE: 97.2 F | RESPIRATION RATE: 16 BRPM | WEIGHT: 165.8 LBS | HEART RATE: 70 BPM | OXYGEN SATURATION: 100 %

## 2020-08-17 PROBLEM — F17.200 SMOKING: Status: ACTIVE | Noted: 2020-08-17

## 2020-08-17 PROBLEM — E03.9 ACQUIRED HYPOTHYROIDISM: Status: ACTIVE | Noted: 2020-08-17

## 2020-08-17 LAB
EXT PREGNANCY TEST URINE: NEGATIVE
EXT. CONTROL: NORMAL

## 2020-08-17 PROCEDURE — 29881 ARTHRS KNE SRG MNISECTMY M/L: CPT | Performed by: PHYSICIAN ASSISTANT

## 2020-08-17 PROCEDURE — 81025 URINE PREGNANCY TEST: CPT | Performed by: ANESTHESIOLOGY

## 2020-08-17 PROCEDURE — 29881 ARTHRS KNE SRG MNISECTMY M/L: CPT | Performed by: ORTHOPAEDIC SURGERY

## 2020-08-17 RX ORDER — ONDANSETRON 2 MG/ML
4 INJECTION INTRAMUSCULAR; INTRAVENOUS ONCE AS NEEDED
Status: DISCONTINUED | OUTPATIENT
Start: 2020-08-17 | End: 2020-08-17 | Stop reason: HOSPADM

## 2020-08-17 RX ORDER — BUPIVACAINE HYDROCHLORIDE AND EPINEPHRINE 2.5; 5 MG/ML; UG/ML
INJECTION, SOLUTION INFILTRATION; PERINEURAL AS NEEDED
Status: DISCONTINUED | OUTPATIENT
Start: 2020-08-17 | End: 2020-08-17 | Stop reason: HOSPADM

## 2020-08-17 RX ORDER — EPHEDRINE SULFATE 50 MG/ML
INJECTION INTRAVENOUS AS NEEDED
Status: DISCONTINUED | OUTPATIENT
Start: 2020-08-17 | End: 2020-08-17

## 2020-08-17 RX ORDER — SODIUM CHLORIDE, SODIUM LACTATE, POTASSIUM CHLORIDE, CALCIUM CHLORIDE 600; 310; 30; 20 MG/100ML; MG/100ML; MG/100ML; MG/100ML
100 INJECTION, SOLUTION INTRAVENOUS CONTINUOUS
Status: DISCONTINUED | OUTPATIENT
Start: 2020-08-17 | End: 2020-08-17 | Stop reason: HOSPADM

## 2020-08-17 RX ORDER — DEXAMETHASONE SODIUM PHOSPHATE 10 MG/ML
INJECTION, SOLUTION INTRAMUSCULAR; INTRAVENOUS AS NEEDED
Status: DISCONTINUED | OUTPATIENT
Start: 2020-08-17 | End: 2020-08-17

## 2020-08-17 RX ORDER — PROPOFOL 10 MG/ML
INJECTION, EMULSION INTRAVENOUS AS NEEDED
Status: DISCONTINUED | OUTPATIENT
Start: 2020-08-17 | End: 2020-08-17

## 2020-08-17 RX ORDER — LIDOCAINE HYDROCHLORIDE 10 MG/ML
INJECTION, SOLUTION EPIDURAL; INFILTRATION; INTRACAUDAL; PERINEURAL AS NEEDED
Status: DISCONTINUED | OUTPATIENT
Start: 2020-08-17 | End: 2020-08-17

## 2020-08-17 RX ORDER — FENTANYL CITRATE/PF 50 MCG/ML
50 SYRINGE (ML) INJECTION
Status: DISCONTINUED | OUTPATIENT
Start: 2020-08-17 | End: 2020-08-17 | Stop reason: HOSPADM

## 2020-08-17 RX ORDER — CEFAZOLIN SODIUM 2 G/50ML
2000 SOLUTION INTRAVENOUS ONCE
Status: DISCONTINUED | OUTPATIENT
Start: 2020-08-17 | End: 2020-08-17 | Stop reason: HOSPADM

## 2020-08-17 RX ORDER — MAGNESIUM HYDROXIDE 1200 MG/15ML
LIQUID ORAL AS NEEDED
Status: DISCONTINUED | OUTPATIENT
Start: 2020-08-17 | End: 2020-08-17 | Stop reason: HOSPADM

## 2020-08-17 RX ORDER — MIDAZOLAM HYDROCHLORIDE 2 MG/2ML
INJECTION, SOLUTION INTRAMUSCULAR; INTRAVENOUS AS NEEDED
Status: DISCONTINUED | OUTPATIENT
Start: 2020-08-17 | End: 2020-08-17

## 2020-08-17 RX ORDER — ONDANSETRON 2 MG/ML
INJECTION INTRAMUSCULAR; INTRAVENOUS AS NEEDED
Status: DISCONTINUED | OUTPATIENT
Start: 2020-08-17 | End: 2020-08-17

## 2020-08-17 RX ORDER — OXYCODONE HYDROCHLORIDE AND ACETAMINOPHEN 5; 325 MG/1; MG/1
1 TABLET ORAL EVERY 4 HOURS PRN
Status: DISCONTINUED | OUTPATIENT
Start: 2020-08-17 | End: 2020-08-17 | Stop reason: HOSPADM

## 2020-08-17 RX ORDER — KETOROLAC TROMETHAMINE 30 MG/ML
30 INJECTION, SOLUTION INTRAMUSCULAR; INTRAVENOUS ONCE
Status: COMPLETED | OUTPATIENT
Start: 2020-08-17 | End: 2020-08-17

## 2020-08-17 RX ORDER — FENTANYL CITRATE 50 UG/ML
INJECTION, SOLUTION INTRAMUSCULAR; INTRAVENOUS AS NEEDED
Status: DISCONTINUED | OUTPATIENT
Start: 2020-08-17 | End: 2020-08-17

## 2020-08-17 RX ORDER — SODIUM CHLORIDE, SODIUM LACTATE, POTASSIUM CHLORIDE, CALCIUM CHLORIDE 600; 310; 30; 20 MG/100ML; MG/100ML; MG/100ML; MG/100ML
125 INJECTION, SOLUTION INTRAVENOUS CONTINUOUS
Status: DISCONTINUED | OUTPATIENT
Start: 2020-08-17 | End: 2020-08-17 | Stop reason: SDUPTHER

## 2020-08-17 RX ORDER — HYDROMORPHONE HCL/PF 1 MG/ML
0.5 SYRINGE (ML) INJECTION
Status: COMPLETED | OUTPATIENT
Start: 2020-08-17 | End: 2020-08-17

## 2020-08-17 RX ORDER — CEFAZOLIN SODIUM 2 G/50ML
SOLUTION INTRAVENOUS AS NEEDED
Status: DISCONTINUED | OUTPATIENT
Start: 2020-08-17 | End: 2020-08-17

## 2020-08-17 RX ADMIN — SODIUM CHLORIDE, SODIUM LACTATE, POTASSIUM CHLORIDE, AND CALCIUM CHLORIDE 125 ML/HR: .6; .31; .03; .02 INJECTION, SOLUTION INTRAVENOUS at 12:21

## 2020-08-17 RX ADMIN — FENTANYL CITRATE 50 MCG: 50 INJECTION INTRAMUSCULAR; INTRAVENOUS at 16:00

## 2020-08-17 RX ADMIN — FENTANYL CITRATE 50 MCG: 50 INJECTION INTRAMUSCULAR; INTRAVENOUS at 15:49

## 2020-08-17 RX ADMIN — EPHEDRINE SULFATE 5 MG: 50 INJECTION, SOLUTION INTRAVENOUS at 14:57

## 2020-08-17 RX ADMIN — PROPOFOL 200 MG: 10 INJECTION, EMULSION INTRAVENOUS at 14:46

## 2020-08-17 RX ADMIN — LIDOCAINE HYDROCHLORIDE 50 MG: 10 INJECTION, SOLUTION EPIDURAL; INFILTRATION; INTRACAUDAL; PERINEURAL at 14:46

## 2020-08-17 RX ADMIN — MIDAZOLAM HYDROCHLORIDE 2 MG: 1 INJECTION, SOLUTION INTRAMUSCULAR; INTRAVENOUS at 14:40

## 2020-08-17 RX ADMIN — OXYCODONE HYDROCHLORIDE AND ACETAMINOPHEN 1 TABLET: 5; 325 TABLET ORAL at 16:47

## 2020-08-17 RX ADMIN — CEFAZOLIN SODIUM 2000 MG: 2 SOLUTION INTRAVENOUS at 14:50

## 2020-08-17 RX ADMIN — FENTANYL CITRATE 25 MCG: 50 INJECTION, SOLUTION INTRAMUSCULAR; INTRAVENOUS at 14:52

## 2020-08-17 RX ADMIN — HYDROMORPHONE HYDROCHLORIDE 0.5 MG: 1 INJECTION, SOLUTION INTRAMUSCULAR; INTRAVENOUS; SUBCUTANEOUS at 16:20

## 2020-08-17 RX ADMIN — DEXAMETHASONE SODIUM PHOSPHATE 4 MG: 10 INJECTION, SOLUTION INTRAMUSCULAR; INTRAVENOUS at 15:06

## 2020-08-17 RX ADMIN — HYDROMORPHONE HYDROCHLORIDE 0.5 MG: 1 INJECTION, SOLUTION INTRAMUSCULAR; INTRAVENOUS; SUBCUTANEOUS at 16:27

## 2020-08-17 RX ADMIN — FENTANYL CITRATE 25 MCG: 50 INJECTION, SOLUTION INTRAMUSCULAR; INTRAVENOUS at 14:57

## 2020-08-17 RX ADMIN — KETOROLAC TROMETHAMINE 30 MG: 30 INJECTION, SOLUTION INTRAMUSCULAR at 16:39

## 2020-08-17 RX ADMIN — ONDANSETRON 4 MG: 2 INJECTION INTRAMUSCULAR; INTRAVENOUS at 15:06

## 2020-08-17 NOTE — ANESTHESIA POSTPROCEDURE EVALUATION
Post-Op Assessment Note    CV Status:  Stable  Pain Score: 0    Pain management: adequate     Mental Status:  Alert and awake   Hydration Status:  Euvolemic   PONV Controlled:  Controlled   Airway Patency:  Patent      Post Op Vitals Reviewed: Yes      Staff: CRNA         No complications documented      BP   122/62   Temp   97 2   Pulse  86   Resp   16   SpO2   97% on 2L NC

## 2020-08-17 NOTE — INTERVAL H&P NOTE
H&P reviewed  After examining the patient I find no changes in the patients condition since the H&P had been written      Vitals:    08/17/20 1139   BP: 100/55   Pulse: 58   Resp: 18   Temp: 98 3 °F (36 8 °C)   SpO2: 97%

## 2020-08-17 NOTE — ANESTHESIA PREPROCEDURE EVALUATION
Procedure:  ARTHROSCOPY KNEE MEDIAL MENISCECTOMY (Right Knee)    Relevant Problems   CARDIO   (+) Postpartum hypertension      MUSCULOSKELETAL   (+) Chronic left-sided low back pain with left-sided sciatica   (+) Osteoarthrosis, wrist      NEURO/PSYCH   (+) Anxiety   (+) Chronic pain   (+) Chronic pain syndrome      PULMONARY   (+) Asthma

## 2020-08-17 NOTE — ANESTHESIA PREPROCEDURE EVALUATION
Procedure:  ARTHROSCOPY KNEE MEDIAL MENISCECTOMY (Right Knee)    Relevant Problems   ANESTHESIA (within normal limits)      CARDIO   (+) Postpartum hypertension      ENDO   (+) Acquired hypothyroidism      GI/HEPATIC (within normal limits)      HEMATOLOGY (within normal limits)      MUSCULOSKELETAL   (+) Chronic left-sided low back pain with left-sided sciatica   (+) Osteoarthrosis, wrist      NEURO/PSYCH   (+) Anxiety   (+) Chronic pain   (+) Chronic pain syndrome      PULMONARY   (+) Asthma   (+) Smoking        Physical Exam    Airway    Mallampati score: II  TM Distance: >3 FB  Neck ROM: full     Dental   No notable dental hx     Cardiovascular  Rhythm: regular, Rate: normal,     Pulmonary  Breath sounds clear to auscultation,     Other Findings        Anesthesia Plan  ASA Score- 2     Anesthesia Type- general with ASA Monitors  Additional Monitors:   Airway Plan: ETT  Plan Factors-Exercise tolerance (METS): >4 METS  Patient is a current smoker  Patient instructed to abstain from smoking on day of procedure  Patient smoked on day of surgery  Obstructive sleep apnea risk education given perioperatively  Induction- intravenous  Postoperative Plan- Plan for postoperative opioid use  Planned trial extubation    Informed Consent- Anesthetic plan and risks discussed with patient  I personally reviewed this patient with the CRNA  Discussed and agreed on the Anesthesia Plan with the CRNA  Mukund Gill

## 2020-08-17 NOTE — DISCHARGE INSTRUCTIONS
INSTRUCTIONS FOLLOWING YOUR KNEE ARTHROSCOPY    FIRST FOLLOW-UP VISIT AFTER SURGERY      Call the office the day after your surgery & make an appointment for 1 week to see Dr Yaya Dill  At that appointment, your stitches will be removed  CANE OR CRUTCHES      You may put as much weight on your leg as tolerated when using the crutches/cane  When you feel that the crutches/cane is not necessary, you may discontinue its use  Use common sense, let pain be your guide for your activities  Climbing stairs, walking and sitting are all permitted as you tolerate them  EXERCISE PROGRAM      At your 1st follow-up visit you will be given a prescription for physical therapy if you have not already been given one  SHOWERING      Keep original bandage on for 48 hours after surgery & replace with band-aids  You should keep the band-aids on until you see Dr Yaya Dill  After 48 hours, it is okay to get your incision wet & you may shower  Do not take any baths or soak in a hot tub or pool until your stitches have been removed  INCISION SITE      You may notice a small amount of blood on your bandage, about the size of a quarter, this is normal and there is no need to worry  If you notice uncontrollable or excessive bleeding, oozing, or redness of the wound please call the office  SWELLING AND DISCOMFORT      You will experience some pain and discomfort after surgery  You will be given a prescription for pain medication  Take the medication as prescribed  If the discomfort is mild, you can take 1 or 2 Tylenol, every 4-6 hours as needed, instead of the prescribed pain medication  You will notice some swelling of your knee after surgery, this is normal      To help reduce the swelling, elevate your leg as much as possible the first two days  In addition, you may place ice on your knee to reduce swelling  Place a plastic bag filled with ice, wrapped in a thin towel, on your knee   Do not keep ice on for more than 15-20 minutes, repeat 4-5 TIMES A DAY, IF POSSIBLE  BLOOD CLOT PREVENTION    Unless otherwise instructed, take one 325 mg aspirin daily for the first 3 weeks following surgery  This is to help decrease the risk of blood clots  If at any time you have discomfort, swelling, or redness in the calf (behind the leg between the knee and the ankle)  or difficulty breathing or heaviness in the chest, please call the doctor immediately  TEMPERATURE      A temperature of less than 101 degrees is common for the first 1-2 days after surgery  If your temperature is elevated above 101 degrees, call the office  IF YOU HAVE ANY OTHER CONCERNS OR QUESTIONS AT ANY TIME, DO NOT HESITATE TO CALL THE OFFICE [phone]

## 2020-08-17 NOTE — OP NOTE
OPERATIVE REPORT  PATIENT NAME: Benoit Wren    :  1985  MRN: [id number]  Pt Location: [facility] OR ROOM 03    SURGERY DATE: 2020    Surgeon(s) and Role:     * Anika Najera MD - Primary     * Manuel Baltazar PA-C - Assisting necessary for procedure for assistance with minimally invasive arthroscopic techniques for medial meniscectomy for assistance with utilization of the camera as well as utilization of the shaver and biter  Chavez THOMAS  And OTC 2nd assistant    Preop Diagnosis:  Other tear of medial meniscus, current injury, right knee, initial encounter [S83 241A]    Post-Op Diagnosis Codes:     * Other tear of medial meniscus, current injury, right knee, initial encounter [S83 241A]    Procedure(s) (LRB):  ARTHROSCOPY KNEE MEDIAL MENISCECTOMY (Right)    Specimen(s):  * No specimens in log *    Estimated Blood Loss:   Minimal    Drains:  * No LDAs found *    Anesthesia Type:   General    Operative Indications: Other tear of medial meniscus, current injury, right knee, initial encounter Cindy Shah a 77-year-old female who has been suffering long-term with right knee pain  MRI did demonstrate a medial meniscus tear  She understood the risks and benefits of right knee arthroscopy for medial meniscectomy and wished to go ahead  The risks are inclusive of but not limited to infection, stiffness, blood clots, failure to alleviate discomfort, worsening of symptoms, failure to regain full strength and ability, failure to achieve anticipated results, and need for further surgery  Operative Findings:  Right knee exam under anesthesia demonstrated full range of motion 0-120 preoperatively with no obvious effusion  Good stability was appreciated as well to varus and valgus stress as well as anterior posterior drawer    Intra-articular findings demonstrated good appearance of articular cartilage in the patellofemoral and lateral compartments with mild grade 2 changes along the medial compartment  Lateral meniscus was intact  No loose bodies in either gutter  ACL was intact  Medial meniscus demonstrated a longitudinal tear in the posterior horn of the medial meniscus requiring debridement to a stable rim of tissue utilizing a biter and shaver  We also utilized the Wand for any peripheral bleeding control  A very stable appearance of remaining meniscal tissue was achieved  Complications:   None    Procedure and Technique:  Natan was taken to the operating room and placed supine on the OR table  She was given preoperative IV antibiotics  General anesthesia was induced in the right knee was taken through exam under anesthesia as described above  Right lower extremity was then prepped and draped in usual sterile fashion  A surgical time-out was taken  We injected local portals  The anterolateral portal was made with 11 blade  Diagnostic arthroscopy was begun an anteromedial portal was made with 11 blade  We demonstrated good appearance of articular cartilage in the patellofemoral joint with no loose bodies in either gutter  The lateral compartment was pristine from articular cartilage and meniscal standpoint  The ACL was intact  Medial compartment demonstrated grade 2 changes  The medial meniscus did have an obvious tear that was a longitudinal split tear requiring debridement to a stable rim of tissue utilizing a biter and shaver  We also utilized the Wand for any peripheral bleeding  We had a very stable appearance of the medial meniscus at the end of the procedure  We then removed the arthroscopic equipment and closed the portals with 4-0 nylon suture  Local anesthetic was injected into the joint and dry, sterile dressings were applied with an Ace bandage  She tolerated procedure well and transferred to recovery room stable condition  She will follow up in 1 week for suture removal   She will be on aspirin for DVT prophylaxis    She will start physical therapy as soon as possible     I was present for the entire procedure and A qualified resident physician was not available    Patient Disposition:  PACU     SIGNATURE: Genny Hagan MD  DATE: August 17, 2020  TIME: 3:10 PM

## 2020-08-18 ENCOUNTER — TELEPHONE (OUTPATIENT)
Dept: OBGYN CLINIC | Facility: CLINIC | Age: 35
End: 2020-08-18

## 2020-08-18 ENCOUNTER — APPOINTMENT (OUTPATIENT)
Dept: PHYSICAL THERAPY | Facility: CLINIC | Age: 35
End: 2020-08-18
Payer: COMMERCIAL

## 2020-08-18 ENCOUNTER — TELEPHONE (OUTPATIENT)
Dept: PHYSICAL THERAPY | Facility: CLINIC | Age: 35
End: 2020-08-18

## 2020-08-18 ENCOUNTER — TELEPHONE (OUTPATIENT)
Dept: OTHER | Facility: OTHER | Age: 35
End: 2020-08-18

## 2020-08-18 NOTE — TELEPHONE ENCOUNTER
Patient made aware of the above msg  Once again went over non-narcotic means of pain control  Patient states she has tried all of this and did not work  She will be going to ER for help with pain

## 2020-08-18 NOTE — TELEPHONE ENCOUNTER
I spoke to patient this morning and she is having uncontrolled pain on the Naproxen and Tylenol OTC, ice 20 min on 20 min off  She did have Toradol in the hospital that did help  Can she have a prescription for a short course of Toradol sent to her pharmacy  She is feeling like she will need to go to ER for pain control

## 2020-08-18 NOTE — TELEPHONE ENCOUNTER
Can you please ankush this patient and advise ice and continued OTC medications  She was upset pre-op yesterday that she was not getting any post op narcotics and we had a strong suspicion that she would be calling in regards to this  She had a very straight forward knee scop yesterday

## 2020-08-18 NOTE — TELEPHONE ENCOUNTER
Per Dr Gibson Lind, he said he will not be prescribing her any Toradol  She is to continue with the advice mentioned below

## 2020-08-18 NOTE — TELEPHONE ENCOUNTER
Spoke with patient on phone regarding missed appointment  Patient was called earlier this morning by  staff regarding potential cancellation  Patient stated she would attend PT IE despite increased pain, but did not call to cancel her IE this afternoon  Therapist called patient to inform her of importance of rescheduling IE within the next week to maintain R knee ROM and strength  Patient stated that MD gave clearance to hold off on PT until swelling and pain dissipated  Patient is currently taking Naproxen and Tylenol for pain  She was educated on importance of performing ice, rather than heat, as she was using heat prior to phone conversation  She was informed of potential increased risk of infection with application of heat on R knee  Patient was emailed HEP to be performed at home until rescheduled IE on 8/26/2020  She was also informed of correct stair navigation techniques to avoid injury or increased pain  Patient verbalized understanding of importance of attending PT IE next week and HEP as emailed  If patient has questions, she was told to call PT during the next week

## 2020-08-18 NOTE — TELEPHONE ENCOUNTER
Pt  had knee surgery yesterday and is in too much pain to go to physical therapy  She would like a call regarding her pain

## 2020-08-26 ENCOUNTER — EVALUATION (OUTPATIENT)
Dept: PHYSICAL THERAPY | Facility: CLINIC | Age: 35
End: 2020-08-26
Payer: COMMERCIAL

## 2020-08-26 ENCOUNTER — OFFICE VISIT (OUTPATIENT)
Dept: OBGYN CLINIC | Facility: CLINIC | Age: 35
End: 2020-08-26

## 2020-08-26 VITALS — HEIGHT: 66 IN | TEMPERATURE: 96 F | WEIGHT: 162 LBS | BODY MASS INDEX: 26.03 KG/M2

## 2020-08-26 DIAGNOSIS — Z98.890 S/P MEDIAL MENISCECTOMY OF RIGHT KNEE: Primary | ICD-10-CM

## 2020-08-26 DIAGNOSIS — Z98.890 STATUS POST MEDIAL MENISCECTOMY OF RIGHT KNEE: Primary | ICD-10-CM

## 2020-08-26 PROCEDURE — 99024 POSTOP FOLLOW-UP VISIT: CPT | Performed by: ORTHOPAEDIC SURGERY

## 2020-08-26 PROCEDURE — 97161 PT EVAL LOW COMPLEX 20 MIN: CPT

## 2020-08-26 NOTE — PROGRESS NOTES
Assessment/Plan:  1  Status post medial meniscectomy of right knee         Scribe Attestation    I,:   Ck Oscar am acting as a scribe while in the presence of the attending physician :        I,:   Akanksha Duvall MD personally performed the services described in this documentation    as scribed in my presence :          I do believe Natan is progressing well 1 week status post   Her incisions appear dry, clean and intact no signs of infection or discharge  Her sutures were removed today in the office and Steri-Strips were applied  I did review her intraoperative imaging with her today in the office  At this time, she is to continue through protocol with formal physical therapy  I will have her follow up back in the office in 4 weeks for repeat clinical evaluation of both her knees  Subjective:   Miguelito Lancaster is a 28 y o  female who presents to the office today for 1 week status post right knee arthroscopy and medial meniscectomy  She states she is overall doing well with no big complaints  She states she went to her first formal physical therapy appointment today and was pleased  She states her pain has significantly decreased since last week  She states she has been slowly progressing with walking up and down stairs  She notes she must continue taking Tylenol and Advil daily for pain relief  She notes mild paresthesia along the lateral aspect of her knee  She denies any radicular symptoms  She denies any fevers, chills or sweats  Review of Systems   Constitutional: Positive for activity change  Negative for chills, fever and unexpected weight change  HENT: Negative for hearing loss, nosebleeds and sore throat  Eyes: Negative for pain, redness and visual disturbance  Respiratory: Negative for cough, shortness of breath and wheezing  Cardiovascular: Negative for chest pain, palpitations and leg swelling     Gastrointestinal: Negative for abdominal pain, nausea and vomiting  Endocrine: Negative for polydipsia and polyuria  Genitourinary: Negative for dysuria and hematuria  Musculoskeletal:        See HPI   Skin: Negative for rash and wound  Neurological: Negative for dizziness, numbness and headaches  Psychiatric/Behavioral: Negative for decreased concentration and suicidal ideas  The patient is not nervous/anxious  Past Medical History:   Diagnosis Date    Anxiety     Chronic pain     left hip    Disease of thyroid gland     Ectopic pregnancy with intrauterine pregnancy      and     Hashimoto's disease     Hashimoto's thyroiditis     IBS (irritable bowel syndrome)     MVP (mitral valve prolapse)     not on  any rx   Pituitary tumor     Polycystic ovarian syndrome     Varicella        Past Surgical History:   Procedure Laterality Date     SECTION      x2    DERMOID CYST  EXCISION      ECTOPIC PREGNANCY SURGERY      x2    WI  DELIVERY ONLY N/A 1/15/2020    Procedure:  SECTION () REPEAT;  Surgeon: Morena Morris MD;  Location: [facility];  Service: Obstetrics    WI KNEE SCOPE,MED/LAT MENISECTOMY Right 2020    Procedure: ARTHROSCOPY KNEE MEDIAL MENISCECTOMY;  Surgeon: Manjula Francis MD;  Location: [facility];  Service: Orthopedics       Family History   Problem Relation Age of Onset    Diabetes Brother     Heart disease Maternal Grandmother        Social History     Occupational History    Not on file   Tobacco Use    Smoking status: Current Every Day Smoker     Packs/day: 0 25     Types: Cigarettes    Smokeless tobacco: Never Used   Substance and Sexual Activity    Alcohol use: Not Currently    Drug use: Yes     Types:  Other     Comment: was on methadone for pain management over 1 year ago    Sexual activity: Yes     Partners: Male     Birth control/protection: None         Current Outpatient Medications:     Euthyrox 125 MCG tablet, Take 250 mcg by mouth daily, Disp: , Rfl:    lidocaine (LIDODERM) 5 %, Apply 1 patch topically daily Remove & Discard patch within 12 hours or as directed by MD cut to size of affected area (Patient not taking: Reported on 2/10/2020), Disp: 15 patch, Rfl: 0    naproxen (NAPROSYN) 500 mg tablet, Take 500 mg by mouth 2 (two) times a day with meals, Disp: , Rfl:     phenazopyridine (PYRIDIUM) 200 mg tablet, Take 1 tablet (200 mg total) by mouth 3 (three) times a day (Patient not taking: Reported on 7/2/2020), Disp: 6 tablet, Rfl: 0    Allergies   Allergen Reactions    Demerol [Meperidine] Other (See Comments)     "hot and trouble breathing"    Lactose GI Intolerance    Latex Rash    Morphine And Related Rash    Other      Adhesive tape       Objective:  Vitals:    08/26/20 1532   Temp: (!) 96 °F (35 6 °C)       Right Knee Exam     Tenderness   The patient is experiencing no tenderness  Range of Motion   Extension: 0   Flexion: 120     Other   Erythema: absent  Scars: present (Well healing surgical incisions )  Sensation: normal  Pulse: present (2+ dorsal pedal)  Effusion: effusion (trace) present    Comments:    Incisions appear dry, clean and intact with no signs of infection or discharge  Sutures were removed and steri-strips were applied  Observations     Right Knee   Positive for effusion (trace)  Physical Exam  Vitals signs reviewed  Constitutional:       Appearance: She is well-developed  HENT:      Head: Normocephalic and atraumatic  Eyes:      General:         Right eye: No discharge  Left eye: No discharge  Conjunctiva/sclera: Conjunctivae normal       Pupils: Pupils are equal, round, and reactive to light  Neck:      Musculoskeletal: Normal range of motion and neck supple  Cardiovascular:      Rate and Rhythm: Normal rate  Pulmonary:      Effort: Pulmonary effort is normal  No respiratory distress  Musculoskeletal:      Right knee: She exhibits effusion (trace)  Comments: As noted in HPI  Skin:     General: Skin is warm and dry  Neurological:      Mental Status: She is alert and oriented to person, place, and time

## 2020-08-26 NOTE — PROGRESS NOTES
PT Evaluation     Today's date: 2020  Patient name: Brian Atkins  : 1985  MRN: [id number]  Referring provider: Shubham Romero MD  Dx:   Encounter Diagnosis     ICD-10-CM    1  S/P medial meniscectomy of right knee  Z98 [id number] Ambulatory referral to Physical Therapy       Start Time: 2851  Stop Time: 3059  Total time in clinic (min): 50 minutes    Assessment  Assessment details: Brian Atkins is a 28y o  year old female reports to physical therapy with symptoms consistent with referring diagnosis of: S/P medial meniscectomy of right knee  (primary encounter diagnosis) Plan: Ambulatory referral to Physical Therapy  Patient demonstrates limitations in R knee strength, B LE strength, and overall functional mobility  See impairments below for details  Patient is limited in the following functional activities: standing greater than 10 minutes, walking greater than 10 minutes, sitting greater than 5 minutes, ascending/descending stairs, and running  Impairments/Exam Findings:    Reduced ROM: decreased R knee flexion ROM as compared to L   Reduced strength: decreased R hip and knee strength globally, decreased L hip strength    + TTP and increased soft tissue density: R medial and lateral joint line, R quadriceps tendon   Gait abnormalities: antalgic gait with decreased bertha, gait speed, and R knee flexion at initial contact    Decreased BLE single limb balance time    FOTO score is 38% with a 63% prediction in function  Patient requires skilled physical therapy to restore prior level of function, address functional limitations, and progress towards independence with home exercise program  Patient was educated on HEP as noted in flow sheet, performing ice throughout the day, ascending/descending stairs, and signs/symptoms of infection/DVT  Patient verbalized and demonstrated understanding of HEP and plan of care      Symptom irritability: lowPrognosis details: Patient's progress towards goals is limited due to significant PMHx as noted in chart  Goals  STGs to be achieved in 4 weeks  -STG 1: Patient will be independent with HEP    -STG 2: Patient will have 0/10 R knee pain at rest    -STG 3: Patient will improve R LE hip and knee strength by 1/2 MMT  -STG 4: Patient will report 40% functional improvement  -STG 5: Patient will improve R SLB by 5 seconds  -STG 6: Patient will be able to jog  5 miles with no R knee pain  -STG 7: Patient will ambulate greater than 20 minutes to shop independently  LTGs to be achieved in 8 weeks  -LTG 1: Patient will demonstrate independence with maintenance program    -LTG 2: Patient will perform all functional activities with less than 2/10 R knee pain  -LTG 3: Patient will improve FOTO score by 10 points  -LTG 4: Patient will report 80% functional improvement  -LTG 5: Patient will improve R SLB to greater than 15 seconds  -LTG 6: Patient will run greater than 5 miles with no R knee pain  -LTG 7: Patient will be able to perform deep squat with no R knee pain  Plan  Patient would benefit from: PT eval and skilled physical therapy  Planned modality interventions: TENS, cryotherapy, electrical stimulation/English stimulation, ultrasound, traction and unattended electrical stimulation  Planned therapy interventions: manual therapy, joint mobilization, activity modification, Ghosh taping, massage, patient education, stretching, strengthening, therapeutic activities, therapeutic exercise, therapeutic training, flexibility, functional ROM exercises, graded exercise, graded activity, graded motor and home exercise program  Frequency: 3x week  Duration in weeks: 6  Treatment plan discussed with: patient        Subjective Evaluation    History of Present Illness  Date of surgery: 8/17/2020  Mechanism of injury: surgery  Mechanism of injury: Patient presents to PT S/P R meniscectomy on 8/17/2020   She initially ran outside her house when she heard 3 pitbulls attacking her neighbor's dog  Patient reports difficulties with ascending/descending stairs, ambulating slower than usual, unable to perform squats to reach lower shelf in kitchen, sitting for greater than 5 minutes, walking greater than 10 minutes, and standing greater than 10 minutes  She feels like she is off balance at times when she is standing on her R LE  She complains of numbness and tenderness of medial and lateral knee  When she sits on the toilet, she complains of numbness down R LE  She uses bamboo walking stick in garden for balance support  She recently lost her mother and her father  She is up all night making phone calls to make arrangements with various countries for her father  She is currently driving  She follows up with Dr Mery Lane at 3:00 pm today  She loves to garden, run, and workout  She has 3 children  Quality of life: good    Pain  Current pain ratin  At best pain ratin  At worst pain rating: 10  Location: R knee  Quality: sharp and throbbing  Relieving factors: ice, medications and rest  Aggravating factors: standing, walking, sitting and running  Progression: no change    Social Support  Steps to enter house: yes  3  Stairs in house: yes   24  Lives in: multiple-level home  Lives with: young children and significant other    Employment status: working (Art and nail dealer, personal aide for elderly )    Diagnostic Tests  MRI studies: abnormal (R medial meniscus tear)  Treatments  Current treatment: medication  Patient Goals  Patient goals for therapy: improved balance, return to sport/leisure activities, independence with ADLs/IADLs, increased strength and decreased pain  Patient goal: Run 5 miles with no R knee pain, perform home workout (squats)         Objective     Observations     Right Knee   Positive for atrophy and incision  Additional Observation Details  Incisions in tact  No signs of drainage or infection   Stitches present as patient has follow up appointment after initial evaluation for removal  Positive R quad atrophy  Tenderness     Right Knee   Tenderness in the ITB, lateral joint line, medial joint line and quadriceps tendon  Neurological Testing     Sensation     Knee   Left Knee   Intact: light touch    Right Knee   Diminished: light touch     Comments   Right light touch: L2, L3  Active Range of Motion   Left Knee   Flexion: 145 degrees   Extension: 0 degrees     Right Knee   Flexion: 135 degrees with pain  Extension: 0 degrees with pain    Passive Range of Motion   Left Knee   Normal passive range of motion    Right Knee   Normal passive range of motion    Mobility   Patellar Mobility:   Left Knee   WFL: medial, lateral, superior and inferior  Right Knee   WFL: medial, lateral, superior and inferior    Patellar Static Positioning   Left Knee: WFL  Right Knee: [facility]    Strength/Myotome Testing     Left Knee   Normal strength    Right Knee   Flexion: 4-  Extension: 4-  Quadriceps contraction: fair    Additional Strength Details  B hip strength 3+/5 MMT  B ankle strength 5/5 MMT    Tests     Additional Tests Details  No special tests performed as ortho note reviewed prior to IE  Swelling     Left Knee Girth Measurement (cm)   Joint line: 38 cm  10 cm above joint line: 42 cm  10 cm below joint line: 35 cm    Right Knee Girth Measurement (cm)   Joint line: 38 cm  10 cm above joint line: 41 cm  10 cm below joint line: 35 cm    Ambulation   Weight-Bearing Status   Weight-Bearing Status (Left): full weight bearing   Weight-Bearing Status (Right): full weight-bearing      Ambulation: Level Surfaces   Ambulation without assistive device: independent    Ambulation: Stairs   Ascend stairs: independent  Pattern: non-reciprocal  Railings: one rail  Descend stairs: independent  Pattern: non-reciprocal  Railings: one rail    Observational Gait   Gait: antalgic   Decreased walking speed       Functional Assessment        Single Leg Stance   Left: 3 seconds  Right: 4 seconds         Precautions: Standard, S/P R meniscectomy 8/17/2020, asthma, chronic LBP, R PFPS, ALLERGIES TO LATEX AND ADHESIVE TAPE      Manuals 8/26                                                                Neuro Re-Ed             Quad sets Rev             SL balance on even, uneven surface              Hip add squeeze             Clamshells             Ther Ex             NuStep warmup             SLR flex Rev             Gastroc stretch with SOS Rev             Heel slides  Rev            Mini squats              SAQ             LAQ             Step ups for/lat             Ther Activity                                       Gait Training                                       Modalities             Ice post

## 2020-09-01 ENCOUNTER — OFFICE VISIT (OUTPATIENT)
Dept: PHYSICAL THERAPY | Facility: CLINIC | Age: 35
End: 2020-09-01
Payer: COMMERCIAL

## 2020-09-01 DIAGNOSIS — Z98.890 S/P MEDIAL MENISCECTOMY OF RIGHT KNEE: Primary | ICD-10-CM

## 2020-09-01 PROCEDURE — 97110 THERAPEUTIC EXERCISES: CPT

## 2020-09-01 NOTE — PROGRESS NOTES
Daily Note     Today's date: 2020  Patient name: Chelsea Amezcua  : 1985  MRN: [id number]  Referring provider: Arabella Nino MD  Dx:   Encounter Diagnosis     ICD-10-CM    1  S/P medial meniscectomy of right knee  Z98 890        Start Time: 1320  Stop Time: 1400  Total time in clinic (min): 40 minutes    Subjective: Woke up with 7/10 pain right knee; after doing exercises & applying ice - decreased to a 4/10; able to go up steps step over step; has a small pedal bike - too painful to do revolutions       Objective: See treatment diary below      Assessment: Tolerated treatment well  Able to tolerate nustep; mild pain post therex; Patient would benefit from continued PT      Plan: Continue per plan of care   Encouraged continued ice applications      Precautions: Standard, S/P R meniscectomy 2020, asthma, chronic LBP, R PFPS, ALLERGIES TO LATEX AND ADHESIVE TAPE      Manuals                                                                Neuro Re-Ed             Quad sets Rev  2 x 10            SL balance on even, uneven surface              Hip add squeeze  X 10            Clamshells  nv           Ther Ex             NuStep warmup  10' lvl1            SLR flex Rev  X 10            Gastroc stretch with SOS Rev  Sit SOS 10 sec x 5            Heel slides  Rev X 20            Mini squats              SAQ  X 10 h5           LAQ  X 10 h5           Step ups for/lat  nv           Ther Activity                                       Gait Training                                       Modalities             Ice post

## 2020-09-02 ENCOUNTER — APPOINTMENT (OUTPATIENT)
Dept: PHYSICAL THERAPY | Facility: CLINIC | Age: 35
End: 2020-09-02
Payer: COMMERCIAL

## 2020-09-04 ENCOUNTER — OFFICE VISIT (OUTPATIENT)
Dept: PHYSICAL THERAPY | Facility: CLINIC | Age: 35
End: 2020-09-04
Payer: COMMERCIAL

## 2020-09-04 DIAGNOSIS — Z98.890 S/P MEDIAL MENISCECTOMY OF RIGHT KNEE: Primary | ICD-10-CM

## 2020-09-04 PROCEDURE — 97110 THERAPEUTIC EXERCISES: CPT

## 2020-09-04 PROCEDURE — 97140 MANUAL THERAPY 1/> REGIONS: CPT

## 2020-09-04 NOTE — PROGRESS NOTES
Daily Note     Today's date: 2020  Patient name: Nicolás Wilson  : 1985  MRN: [id number]  Referring provider: Linda Escobar MD  Dx:   Encounter Diagnosis     ICD-10-CM    1  S/P medial meniscectomy of right knee  Z98 890        Start Time: 845  Stop Time: 930  Total time in clinic (min): 45 minutes    Subjective: Patient denies R knee pain today  She reports "soreness and ache" in her R knee  Patient was in the hospital with her sister the last 2 days  She is exhausted at this time  Objective: See treatment diary below      Assessment: All exercises performed in supine due to increased ache and soreness in R knee  Tenderness noted on incisions of R knee  Scar massage implemented, with hypertonicity noted  Tolerated treatment well  Patient would benefit from continued PT  Plan: Continue per plan of care        Precautions: Standard, S/P R meniscectomy 2020, asthma, chronic LBP, R PFPS, ALLERGIES TO LATEX AND ADHESIVE TAPE      Manuals           R patellar mobs   AR          R HS stretch   AR          R scar massage   AR                       Neuro Re-Ed             Quad sets Rev  2 x 10  3s x 10          SL balance on even, uneven surface              Hip add squeeze  X 10  2s x 10          Clamshells  nv Supine 2 x 10          Ther Ex             NuStep warmup  10' lvl1  5' L1          SLR flex, abd Rev  X 10  x10          Gastroc stretch with SOS Rev  Sit SOS 10 sec x 5  Supine with SOS 3 x 30s          Heel slides  Rev X 20            Mini squats    x10          SAQ  X 10 h5           LAQ  X 10 h5 x10          Step ups for/lat  nv           Ther Activity                                       Gait Training                                       Modalities             Ice post

## 2020-09-08 ENCOUNTER — OFFICE VISIT (OUTPATIENT)
Dept: PHYSICAL THERAPY | Facility: CLINIC | Age: 35
End: 2020-09-08
Payer: COMMERCIAL

## 2020-09-08 DIAGNOSIS — Z98.890 S/P MEDIAL MENISCECTOMY OF RIGHT KNEE: Primary | ICD-10-CM

## 2020-09-08 PROCEDURE — 97110 THERAPEUTIC EXERCISES: CPT

## 2020-09-08 NOTE — PROGRESS NOTES
Daily Note     Today's date: 2020  Patient name: Abdi Moy  : 1985  MRN: [id number]  Referring provider: Jigna Suarez MD  Dx:   Encounter Diagnosis     ICD-10-CM    1  S/P medial meniscectomy of right knee  Z98 [id number]        Start Time: 1030  Stop Time: 1100  Total time in clinic (min): 30 minutes    Subjective: Patient denies pain at this time  She is able to perform all activities at home with no difficulties  She complains of increased stiffness and tightness when ascending/descending stairs, walking around community, and when in sitting  Objective: See treatment diary below      Assessment: Patient's session was cut short due to arriving 15 minutes late to PT session today  Patient was distracted by phone calls throughout session  She was instructed to perform exercises with proper form, but was unable to follow verbal cues due to cell phone use  Tolerated treatment well  Patient would benefit from continued PT      Plan: Continue per plan of care        Precautions: Standard, S/P R meniscectomy 2020, asthma, chronic LBP, R PFPS, ALLERGIES TO LATEX AND ADHESIVE TAPE      Manuals          R patellar mobs   AR          R HS stretch   AR          R scar massage   AR                       Neuro Re-Ed             Quad sets Rev  2 x 10  3s x 10 5s x 10         Prostretch    10s x 10         SL balance on even, uneven surface              Hip add squeeze  X 10  2s x 10          Clamshells  nv Supine 2 x 10          Ther Ex             NuStep warmup  10' lvl1  5' L1 5' L1         SLR flex, abd Rev  X 10  x10          Gastroc stretch with SOS Rev  Sit SOS 10 sec x 5  Supine with SOS 3 x 30s Supine with SOS 3 x 30s         Heel slides  Rev X 20            Mini squats    x10 Ball squats x10         Peanut knee flex    x20         SAQ  X 10 h5           LAQ  X 10 h5 x10 2 x 10 2#         Step ups for/lat  nv  2 x 10 8"          Ther Activity Gait Training                                       Modalities             Ice post

## 2020-09-09 ENCOUNTER — APPOINTMENT (OUTPATIENT)
Dept: PHYSICAL THERAPY | Facility: CLINIC | Age: 35
End: 2020-09-09
Payer: COMMERCIAL

## 2020-09-14 ENCOUNTER — APPOINTMENT (OUTPATIENT)
Dept: PHYSICAL THERAPY | Facility: CLINIC | Age: 35
End: 2020-09-14
Payer: COMMERCIAL

## 2020-09-14 NOTE — PROGRESS NOTES
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

## 2020-09-15 ENCOUNTER — APPOINTMENT (OUTPATIENT)
Dept: PHYSICAL THERAPY | Facility: CLINIC | Age: 35
End: 2020-09-15
Payer: COMMERCIAL

## 2021-08-03 ENCOUNTER — HOSPITAL ENCOUNTER (EMERGENCY)
Facility: HOSPITAL | Age: 36
Discharge: HOME | End: 2021-08-03
Attending: EMERGENCY MEDICINE
Payer: COMMERCIAL

## 2021-08-03 ENCOUNTER — APPOINTMENT (EMERGENCY)
Dept: RADIOLOGY | Facility: HOSPITAL | Age: 36
End: 2021-08-03
Payer: COMMERCIAL

## 2021-08-03 VITALS
WEIGHT: 162 LBS | HEART RATE: 78 BPM | HEIGHT: 65 IN | BODY MASS INDEX: 26.99 KG/M2 | DIASTOLIC BLOOD PRESSURE: 74 MMHG | SYSTOLIC BLOOD PRESSURE: 116 MMHG | TEMPERATURE: 97 F | OXYGEN SATURATION: 96 % | RESPIRATION RATE: 20 BRPM

## 2021-08-03 DIAGNOSIS — S62.604A: Primary | ICD-10-CM

## 2021-08-03 PROCEDURE — 99283 EMERGENCY DEPT VISIT LOW MDM: CPT

## 2021-08-03 PROCEDURE — 73140 X-RAY EXAM OF FINGER(S): CPT

## 2021-08-03 PROCEDURE — 99284 EMERGENCY DEPT VISIT MOD MDM: CPT | Performed by: PHYSICIAN ASSISTANT

## 2021-08-03 NOTE — Clinical Note
Freeman Jamison was seen and treated in our emergency department on 8/3/2021  Diagnosis:     Ntaan  may return to work on return date  She may return on this date: 08/04/2021         If you have any questions or concerns, please don't hesitate to call        Olga Lidia Amos PA-C    ______________________________           _______________          _______________  Hospital Representative                              Date                                Time
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
## 2021-08-03 NOTE — Clinical Note
Caryn Bryant was seen and treated in our emergency department on 8/3/2021  Diagnosis:     Natan  may return to work on return date  She may return on this date: 08/04/2021         If you have any questions or concerns, please don't hesitate to call        Ellyn Morales PA-C    ______________________________           _______________          _______________  Hospital Representative                              Date                                Time
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
## (undated) DEVICE — ABDOMINAL PAD: Brand: DERMACEA

## (undated) DEVICE — SUT VICRYL 0 CT-1 27 IN J260H

## (undated) DEVICE — PACK C-SECTION PBDS

## (undated) DEVICE — GLOVE INDICATOR PI UNDERGLOVE SZ 6.5 BLUE

## (undated) DEVICE — SKIN MARKER DUAL TIP WITH RULER CAP, FLEXIBLE RULER AND LABELS: Brand: DEVON

## (undated) DEVICE — CHLORAPREP HI-LITE 26ML ORANGE

## (undated) DEVICE — ADHESIVE SKIN HIGH VISCOSITY EXOFIN 1ML

## (undated) DEVICE — Device

## (undated) DEVICE — FABRIC REINFORCED, SURGICAL GOWN, XL: Brand: CONVERTORS

## (undated) DEVICE — GLOVE INDICATOR PI UNDERGLOVE SZ 7 BLUE

## (undated) DEVICE — TIBURON SPLIT SHEET: Brand: CONVERTORS

## (undated) DEVICE — INTENDED FOR TISSUE SEPARATION, AND OTHER PROCEDURES THAT REQUIRE A SHARP SURGICAL BLADE TO PUNCTURE OR CUT.: Brand: BARD-PARKER SAFETY BLADES SIZE 11, STERILE

## (undated) DEVICE — SUT MONOCRYL 0 CTX 36 IN Y398H

## (undated) DEVICE — TELFA NON-ADHERENT ABSORBENT DRESSING: Brand: TELFA

## (undated) DEVICE — LARGE, DISPOSABLE ALEXIS O C-SECTION PROTECTOR - RETRACTOR: Brand: ALEXIS ® O C-SECTION PROTECTOR - RETRACTOR

## (undated) DEVICE — GLOVE SRG LF STRL BGL SKNSNS 7 PF

## (undated) DEVICE — GLOVE INDICATOR PI UNDERGLOVE SZ 7.5 BLUE

## (undated) DEVICE — GAUZE SPONGES,16 PLY: Brand: CURITY

## (undated) DEVICE — PACK ARTHROSCOPY

## (undated) DEVICE — TIBURON EXTREMITY SHEET: Brand: CONVERTORS

## (undated) DEVICE — ACE WRAP 6 IN STERILE

## (undated) DEVICE — TUBING ARTHROSCOPIC WAVE  MAIN PUMP

## (undated) DEVICE — SUT VICRYL 4-0 PS-2 27 IN J426H

## (undated) DEVICE — BLADE SHAVER TORPEDO CRV 4MM 13MM COOLCUT

## (undated) DEVICE — OCCLUSIVE GAUZE STRIP,3% BISMUTH TRIBROMOPHENATE IN PETROLATUM BLEND: Brand: XEROFORM